# Patient Record
Sex: FEMALE | Race: WHITE
[De-identification: names, ages, dates, MRNs, and addresses within clinical notes are randomized per-mention and may not be internally consistent; named-entity substitution may affect disease eponyms.]

---

## 2017-05-02 ENCOUNTER — HOSPITAL ENCOUNTER (INPATIENT)
Dept: HOSPITAL 10 - E/R | Age: 67
LOS: 7 days | Discharge: HOME | DRG: 389 | End: 2017-05-09
Payer: COMMERCIAL

## 2017-05-02 VITALS
HEIGHT: 62 IN | WEIGHT: 132.28 LBS | HEIGHT: 62 IN | BODY MASS INDEX: 24.34 KG/M2 | WEIGHT: 132.28 LBS | BODY MASS INDEX: 24.34 KG/M2

## 2017-05-02 DIAGNOSIS — M81.0: ICD-10-CM

## 2017-05-02 DIAGNOSIS — K46.9: ICD-10-CM

## 2017-05-02 DIAGNOSIS — R55: ICD-10-CM

## 2017-05-02 DIAGNOSIS — N32.89: ICD-10-CM

## 2017-05-02 DIAGNOSIS — M48.56XA: ICD-10-CM

## 2017-05-02 DIAGNOSIS — Z90.710: ICD-10-CM

## 2017-05-02 DIAGNOSIS — Z85.41: ICD-10-CM

## 2017-05-02 DIAGNOSIS — E87.1: ICD-10-CM

## 2017-05-02 DIAGNOSIS — Z92.3: ICD-10-CM

## 2017-05-02 DIAGNOSIS — D64.9: ICD-10-CM

## 2017-05-02 DIAGNOSIS — I70.0: ICD-10-CM

## 2017-05-02 DIAGNOSIS — M47.9: ICD-10-CM

## 2017-05-02 DIAGNOSIS — I70.209: ICD-10-CM

## 2017-05-02 DIAGNOSIS — K56.5: Primary | ICD-10-CM

## 2017-05-02 DIAGNOSIS — I70.90: ICD-10-CM
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
## 2017-05-02 DIAGNOSIS — S32.501A: ICD-10-CM

## 2017-05-02 LAB
ADD SCAN DIFF: NO
ADD UMIC: YES
ALBUMIN SERPL-MCNC: 4.2 G/DL (ref 3.3–4.9)
ALBUMIN/GLOB SERPL: 1.23 {RATIO}
ALP SERPL-CCNC: 56 IU/L (ref 42–121)
ALT SERPL-CCNC: 24 IU/L (ref 13–69)
AMYLASE SERPL-CCNC: 87 U/L (ref 11–123)
ANION GAP SERPL CALC-SCNC: 10 MMOL/L (ref 8–16)
APTT BLD: 22.9 SEC (ref 25–35)
AST SERPL-CCNC: 23 IU/L (ref 15–46)
BASOPHILS # BLD AUTO: 0 10^3/UL (ref 0–0.1)
BASOPHILS NFR BLD: 0.2 % (ref 0–2)
BILIRUB DIRECT SERPL-MCNC: 0 MG/DL (ref 0–0.2)
BILIRUB SERPL-MCNC: 0.7 MG/DL (ref 0.2–1.3)
BUN SERPL-MCNC: 15 MG/DL (ref 7–20)
CALCIUM SERPL-MCNC: 9 MG/DL (ref 8.4–10.2)
CHLORIDE SERPL-SCNC: 100 MMOL/L (ref 97–110)
CO2 SERPL-SCNC: 26 MMOL/L (ref 21–31)
COLOR UR: (no result)
CREAT SERPL-MCNC: 0.55 MG/DL (ref 0.44–1)
EOSINOPHIL # BLD: 0 10^3/UL (ref 0–0.5)
EOSINOPHIL NFR BLD: 0.2 % (ref 0–7)
ERYTHROCYTE [DISTWIDTH] IN BLOOD BY AUTOMATED COUNT: 13.7 % (ref 11.5–14.5)
GLOBULIN SER-MCNC: 3.4 G/DL (ref 1.3–3.2)
GLUCOSE SERPL-MCNC: 114 MG/DL (ref 70–220)
GLUCOSE UR STRIP-MCNC: NEGATIVE %
HCT VFR BLD CALC: 34 % (ref 37–47)
HGB BLD-MCNC: 11.7 G/DL (ref 12–16)
INR PPP: 0.9
KETONES UR STRIP.AUTO-MCNC: (no result) MG/DL
LYMPHOCYTES # BLD AUTO: 0.7 10^3/UL (ref 0.8–2.9)
LYMPHOCYTES NFR BLD AUTO: 11.3 % (ref 15–51)
MCH RBC QN AUTO: 32 PG (ref 29–33)
MCHC RBC AUTO-ENTMCNC: 34.4 G/DL (ref 32–37)
MCV RBC AUTO: 92.9 FL (ref 82–101)
MONOCYTES # BLD: 0.3 10^3/UL (ref 0.3–0.9)
MONOCYTES NFR BLD: 5 % (ref 0–11)
NEUTROPHILS # BLD: 5.2 10^3/UL (ref 1.6–7.5)
NEUTROPHILS NFR BLD AUTO: 82.8 % (ref 39–77)
NITRITE UR QL STRIP.AUTO: NEGATIVE
NRBC # BLD MANUAL: 0 10^3/UL (ref 0–0)
NRBC BLD QL: 0 /100WBC (ref 0–0)
PLATELET # BLD: 229 10^3/UL (ref 140–415)
PMV BLD AUTO: 9.4 FL (ref 7.4–10.4)
POTASSIUM SERPL-SCNC: 3.7 MMOL/L (ref 3.5–5.1)
PROT SERPL-MCNC: 7.6 G/DL (ref 6.1–8.1)
PROTHROMBIN TIME: 12.1 SEC (ref 12.2–14.2)
PT RATIO: 0.9
RBC # BLD AUTO: 3.66 10^6/UL (ref 4.2–5.4)
RBC # UR AUTO: (no result) /UL
RBC #/AREA URNS HPF: (no result) /HPF
SODIUM SERPL-SCNC: 132 MMOL/L (ref 135–144)
SQUAMOUS #/AREA URNS HPF: (no result) /[HPF]
TROPONIN I SERPL-MCNC: < 0.012 NG/ML (ref 0–0.12)
URINE BILIRUBIN (DIP): NEGATIVE
URINE TOTAL PROTEIN (DIP): NEGATIVE
UROBILINOGEN UR STRIP-ACNC: (no result) (ref 0.1–1)
WBC # BLD AUTO: 6.2 10^3/UL (ref 4.8–10.8)
WBC # UR STRIP: NEGATIVE /UL

## 2017-05-02 PROCEDURE — 81003 URINALYSIS AUTO W/O SCOPE: CPT

## 2017-05-02 PROCEDURE — 84443 ASSAY THYROID STIM HORMONE: CPT

## 2017-05-02 PROCEDURE — 93880 EXTRACRANIAL BILAT STUDY: CPT

## 2017-05-02 PROCEDURE — 87086 URINE CULTURE/COLONY COUNT: CPT

## 2017-05-02 PROCEDURE — 82150 ASSAY OF AMYLASE: CPT

## 2017-05-02 PROCEDURE — 84484 ASSAY OF TROPONIN QUANT: CPT

## 2017-05-02 PROCEDURE — 82306 VITAMIN D 25 HYDROXY: CPT

## 2017-05-02 PROCEDURE — 74177 CT ABD & PELVIS W/CONTRAST: CPT

## 2017-05-02 PROCEDURE — 96376 TX/PRO/DX INJ SAME DRUG ADON: CPT

## 2017-05-02 PROCEDURE — 70450 CT HEAD/BRAIN W/O DYE: CPT

## 2017-05-02 PROCEDURE — 87040 BLOOD CULTURE FOR BACTERIA: CPT

## 2017-05-02 PROCEDURE — 83735 ASSAY OF MAGNESIUM: CPT

## 2017-05-02 PROCEDURE — 74250 X-RAY XM SM INT 1CNTRST STD: CPT

## 2017-05-02 PROCEDURE — 80048 BASIC METABOLIC PNL TOTAL CA: CPT

## 2017-05-02 PROCEDURE — 96361 HYDRATE IV INFUSION ADD-ON: CPT

## 2017-05-02 PROCEDURE — 74176 CT ABD & PELVIS W/O CONTRAST: CPT

## 2017-05-02 PROCEDURE — 85025 COMPLETE CBC W/AUTO DIFF WBC: CPT

## 2017-05-02 PROCEDURE — 93306 TTE W/DOPPLER COMPLETE: CPT

## 2017-05-02 PROCEDURE — 84100 ASSAY OF PHOSPHORUS: CPT

## 2017-05-02 PROCEDURE — 80053 COMPREHEN METABOLIC PANEL: CPT

## 2017-05-02 PROCEDURE — 96374 THER/PROPH/DIAG INJ IV PUSH: CPT

## 2017-05-02 PROCEDURE — 72158 MRI LUMBAR SPINE W/O & W/DYE: CPT

## 2017-05-02 PROCEDURE — 93005 ELECTROCARDIOGRAM TRACING: CPT

## 2017-05-02 PROCEDURE — 83690 ASSAY OF LIPASE: CPT

## 2017-05-02 PROCEDURE — 85730 THROMBOPLASTIN TIME PARTIAL: CPT

## 2017-05-02 PROCEDURE — 81001 URINALYSIS AUTO W/SCOPE: CPT

## 2017-05-02 PROCEDURE — 96375 TX/PRO/DX INJ NEW DRUG ADDON: CPT

## 2017-05-02 PROCEDURE — C9113 INJ PANTOPRAZOLE SODIUM, VIA: HCPCS

## 2017-05-02 PROCEDURE — 74000: CPT

## 2017-05-02 PROCEDURE — 85610 PROTHROMBIN TIME: CPT

## 2017-05-02 NOTE — ERA
ER Documentation


Chief Complaint


Date/Time


DATE: 5/2/17 


TIME: 18:14


Chief Complaint


Pt with AP, vomiting X 2 days and GLF this morning.





HPI


This is a very pleasant 66-year-old female presents the emergency department 

after she had a brief transient loss of consciousness at roughly 10 AM, 2 hours 

before she arrived to the hospital.  The patient indicates that just prior to 

the brief transient loss of consciousness the patient had been feeling 

lightheaded and dizzy.  She states she attempted to grab onto a piece of 

furniture when she had syncopized and hit the back of her head onto a tile 

surface.  She is complaining of a mild headache which she states is bandlike.  

The patient also indicates that yesterday evening at 10 PM she had developed a 

severe lower abdominal pain.  She indicates that the pain has been intermittent 

at 5 AM this morning, 5 hours before the syncope, the patient had a large bowel 

movement and felt severe nausea.  She induced an episode of nonbloody 

nonbilious emesis.  She denies any hemoptysis hematemesis or melanotic stools.  

She does indicate that 13 years ago she had a total abdominal hysterectomy due 

to ovarian carcinoma.  She has had previous small bowel obstructions in the 

past which she indicates feels similar to the abdominal pain she has been 

experiencing since 10 PM last night.  The patient denies any chest pain or 

pressure that radiates to the neck arm back or jaw.  She stated she had no 

headache prior to the syncope episode.  She also indicates she has not 

experienced any neck pain or numbness or tingling of her upper or lower 

extremities.  She has no shortness of breath at rest or exertion.  She denies 

any recent travel or prolonged immobilization.  She is no chest pain or 

pressure that radiates to the neck arm back or jaw





ROS


All systems reviewed and are negative except as per history of present illness.





Medications


Home Meds


Active Scripts


Docusate Sodium* (Colace*) 100 Mg Capsule, 100 MG PO BID for 30 Days, CAP


   Prov:LITA TORRES M.         12/16/16


Alendronate Sodium* (Fosamax*) 70 Mg Tablet, 70 MG PO Th@0730 for 30 Days, TAB


   Prov:LITA TORRES M.         12/16/16


Discontinued Scripts


Polyethylene Glycol* (Miralax*) 17 Gm Powd.pack, 17 GM PO DAILY for 30 Days, 1 

Refill


   Prov:LITA TORRES         12/16/16





Allergies


Allergies:  


Coded Allergies:  


     No Known Allergies (Verified  Allergy, Mild, 5/2/17)





PMhx/Soc


History of Surgery:  Yes (s/puterin resection/chemo,hysterectomy, appendectomy, 

multiple sx)


Anesthesia Reaction:  No


Hx Neurological Disorder:  No


Hx Respiratory Disorders:  No


Hx Cardiac Disorders:  No


Hx Psychiatric Problems:  No


Hx Miscellaneous Medical Probl:  Yes (cervical ca, uterin ca)


Hx Alcohol Use:  No


Hx Substance Use:  No


Hx Tobacco Use:  No


Smoking Status:  Never smoker





Physical Exam


Vitals





Vital Signs








  Date Time  Temp Pulse Resp B/P Pulse Ox O2 Delivery O2 Flow Rate FiO2


 


5/2/17 19:00 98.6 68 16 130/86 99 Room Air  


 


5/2/17 13:11 99.4 89 18 111/62 96   








Physical Exam


Constitutional:Well-developed. Well-nourished.


HEENT:Normocephalic. Atraumatic.Pupils were equal round reactive to light. 

Moist mucous membranes.No tonsillar exudates.  No nasal septal hematoma.  No 

hemotympanum.  Funduscopy exam shows sharp optic disc bilaterally


Neck: No nuchal rigidity. No lymphadenopathy. No posterior cervical spine 

tenderness or step-offs.


Respiratory: Not using accessory muscles of respiration.Lungs were clear to 

auscultation bilaterally. No rhonchi. No rales. No wheezing. 


Cardiovascular: Regular rate regular rhythm.No murmurs. No rubs were 

appreciated.S1, S2 normal. Distal pulses are palpable 2+ bilaterally.


GI: Abdomen was soft.  Mild tenderness in the left and the right lower quadrant 

with hyperactive bowel sounds. Non Distended. No pulsatile abdominal masses or 

bruits. No rebound. No guarding. 


Muscle skeletal: Full range of motion of both the upper and lower extremities 

bilaterally.Normal muscle tone.No assymetrical calf tenderness or swelling. 


Skin: No petechia, no purpura. No lesions on the palms or the soles of the 

feet. No maculopapular rash.


NEURO: Patient was alert, awake, orientated x3.No facial droop. Gait observed 

and normal with no ataxia.Speech had regular rate and rhythm. No focal 

neurological deficits.


Result Diagram:  


5/2/17 1620                                                                    

            5/2/17 1620





Results 24 hrs





 Laboratory Tests








Test


  5/2/17


16:20 5/2/17


16:38


 


White Blood Count 6.210^3/ul  


 


Red Blood Count 3.6610^6/ul  


 


Hemoglobin 11.7g/dl  


 


Hematocrit 34.0%  


 


Mean Corpuscular Volume 92.9fl  


 


Mean Corpuscular Hemoglobin 32.0pg  


 


Mean Corpuscular Hemoglobin


Concent 34.4g/dl 


  


 


 


Red Cell Distribution Width 13.7%  


 


Platelet Count 15453^3/UL  


 


Mean Platelet Volume 9.4fl  


 


Neutrophils % 82.8%  


 


Lymphocytes % 11.3%  


 


Monocytes % 5.0%  


 


Eosinophils % 0.2%  


 


Basophils % 0.2%  


 


Nucleated Red Blood Cells % 0.0/100WBC  


 


Neutrophils # 5.210^3/ul  


 


Lymphocytes # 0.710^3/ul  


 


Monocytes # 0.310^3/ul  


 


Eosinophils # 0.010^3/ul  


 


Basophils # 0.010^3/ul  


 


Nucleated Red Blood Cells # 0.010^3/ul  


 


Prothrombin Time 12.1Sec  


 


Prothrombin Time Ratio 0.9  


 


INR International Normalized


Ratio 0.90 


  


 


 


Activated Partial


Thromboplast Time 22.9Sec 


  


 


 


Sodium Level 132mmol/L  


 


Potassium Level 3.7mmol/L  


 


Chloride Level 100mmol/L  


 


Carbon Dioxide Level 26mmol/L  


 


Anion Gap 10  


 


Blood Urea Nitrogen 15mg/dl  


 


Creatinine 0.55mg/dl  


 


Glucose Level 114mg/dl  


 


Calcium Level 9.0mg/dl  


 


Total Bilirubin 0.7mg/dl  


 


Direct Bilirubin 0.00mg/dl  


 


Indirect Bilirubin 0.7mg/dl  


 


Aspartate Amino Transf


(AST/SGOT) 23IU/L 


  


 


 


Alanine Aminotransferase


(ALT/SGPT) 24IU/L 


  


 


 


Alkaline Phosphatase 56IU/L  


 


Troponin I < 0.012ng/ml  


 


Total Protein 7.6g/dl  


 


Albumin 4.2g/dl  


 


Globulin 3.40g/dl  


 


Albumin/Globulin Ratio 1.23  


 


Amylase Level 87U/L  


 


Lipase 60U/L  


 


Urine Color  LT. YELLOW 


 


Urine Clarity  CLEAR 


 


Urine pH  7.0 


 


Urine Specific Gravity  1.010 


 


Urine Ketones  TRACE 


 


Urine Nitrite  NEGATIVE 


 


Urine Bilirubin  NEGATIVE 


 


Urine Urobilinogen  0.2  E.U./dL 


 


Urine Leukocyte Esterase  NEGATIVE 


 


Urine Microscopic RBC  0-2/HPF 


 


Urine Microscopic WBC  0-2/HPF 


 


Urine Squamous Epithelial


Cells 


  FEW 


 


 


Urine Hemoglobin  TRACE 


 


Urine Glucose  NEGATIVE% 


 


Urine Total Protein  NEGATIVE 








 Current Medications








 Medications


  (Trade)  Dose


 Ordered  Sig/Stephanie


 Route


 PRN Reason  Start Time


 Stop Time Status Last Admin


Dose Admin


 


 Sodium Chloride


  (NS)  1,000 ml @ 


 1,000 mls/hr  Q1H STAT


 IV


   5/2/17 16:10


 5/2/17 17:09 DC 5/2/17 16:57


 


 


 Morphine Sulfate


  (morphine)  4 mg  ONCE  STAT


 IV


   5/2/17 16:10


 5/2/17 16:13 DC 5/2/17 16:59


 


 


 Ondansetron HCl


  (Zofran Inj)  4 mg  ONCE  STAT


 IV


   5/2/17 16:10


 5/2/17 16:13 DC 5/2/17 16:57


 


 


 IV Flush 10 ml  10 ml  STK-MED ONCE


 .ROUTE


   5/2/17 17:29


 5/2/17 17:30 DC  


 


 


 Sodium Chloride


  (NS)  100 ml @ ud  STK-MED ONCE


 .ROUTE


   5/2/17 17:29


 5/2/17 17:30 DC  


 


 


 Iohexol


  (Omnipaque 300mg/


 ml)  150 ml  STK-MED ONCE


 .ROUTE


   5/2/17 17:29


 5/2/17 17:30 DC  


 


 


 Barium Sulfate


  (Readi-Cat 2 (


 [employer] ))  450 ml  STK-MED ONCE


 PO


   5/2/17 17:39


 5/2/17 17:40 DC 5/2/17 18:26


 


 


 Barium Sulfate


  (Readi-Cat 2 (


 [employer] ))  450 ml  STK-MED ONCE


 PO


   5/2/17 17:39


 5/2/17 17:40 DC 5/2/17 18:30


 


 


 Ondansetron HCl


  (Zofran Inj)  4 mg  ONCE  STAT


 IV


   5/2/17 21:18


 5/2/17 21:19 DC 5/2/17 21:22


 


 


 Morphine Sulfate


  (morphine)  4 mg  ONCE  ONCE


 IV


   5/2/17 21:39


 5/2/17 21:40 DC 5/2/17 21:45


 


 


 Ondansetron HCl


  (Zofran Inj)  4 mg  ER BRIDGE PRN


 IV


 NAUSEA AND/OR VOMITING  5/2/17 22:00


 5/3/17 21:59   


 











Procedures/MDM


The patient presented to the emergency department with a transient loss of 

consciousness with loss of postural tone, suggestive of a syncope episode. The 

differential diagnosis of syncope is vast but my workup considered common 

benign disorders to life-threatening processes. Therefore my differential 

diagnosis included but was not limited to reflex-mediated syncope such as 

vasovagal or carotid sinus syncope from coughing, sneezing, micturition, or GI 

stimulation (eg, defecation). Other etiologies in my workup included 

orthostatic hypotension which could cause syncope from an abrupt drop in venous 

return to heart from volume depletion. An EKG and cardiac enzymes were obtained 

to rule out cardiac arrhythmias or ischemia. Cardiopulmonary disease such as 

valvular disease, hypertrophic cardiomyopathy, pericardial tamponade, or 

pulmonary embolism were considered as a  factor causing the patients syncope 

episode. The patient had no difference in blood pressure in both arms that 

could suggest aortic dissection or subclavian steal syndrome.  Rectal exam was 

negative for fecal occult blood that could suggest GI bleeding.  Ancillary 

laboratory work was obtained to evaluate for metabolic or electrolyte 

abnormalities.  The patient had no witnessed brief tonic movements that could 

suggest postictal confusion. 





The patient was placed on a cardiac monitor, continuous pulse oximetry and IV 

access established by nursing staff.  The patient received a liter bolus of 0.9 

normal saline and intravenous morphine and Zofran for analgesic control.





12 Lead EKG tracing ordered and reviewed by myself showed: 


Normal sinus rhythm of 75 bpm and no arrhythmia.


NM interval normal.


QRS duration normal.


No ST segment elevation


No ST segment depression. No changes consistent with acute ischemia. 





I obtained a CT scan of the patient's head which showed no acute intracerebral 

hemorrhage mass-effect or midline shift.  I did indicate to the patient the 

syncope could have been a result of a vasovagal episode given that she has been 

experience in pain and had an episode of emesis as well as a large bowel 

movement.  However I did explain to the patient I felt she required further 

evaluation and therefore will be admitted in serious condition to the telemetry 

service.





This patient also complained of abdominal pain. My differential diagnosis 

included but was not limited to abdominal aortic aneurysm, appendicitis, 

pancreatitis, perforated peptic ulcer, perforated viscus, Boerhaave's syndrome 

or visceral pain such as diverticulitis, DKA, esophagitis, hepatitis or bowel 

obstruction.  CT scan of the abdomen or and reviewed by myself as well as the 

radiologist indicated that the patient did have a small bowel obstruction.  At 

this time the patient had an NG tube placed to low continuous suctioning.  I 

spoke with the surgeon Dr. Weeks who kindly requested a small bowel follow-

through that was ordered by myself.  The patient was made n.p.o.  She will be 

admitted to the telemetry service in serious condition with an anticipated stay 

of greater than 2 midnights to the hospitalist Dr. Gray





Critical Care:


Time: 40 minutes


Treatments/Evaluations: Close monitoring and treatment of unstable vital signs, 

cardiorespiratory, and neurologic status, while maintaining tight balance of 

fluid, respiratory, and cardiac interventions.  Time does not include 

performing any of the above billable procedures.





Departure


Diagnosis:  


 Primary Impression:  


 Syncope


 Qualified Code:  R55 - Syncope, unspecified syncope type


 Additional Impression:  


 SBO (small bowel obstruction)


Condition:  Serious











SABINO SALDIVAR May 2, 2017 18:19

## 2017-05-02 NOTE — RADRPT
PROCEDURE:   CT Abdomen and Pelvis without contrast. 

 

CLINICAL INDICATION:    Severe abdominal pain with history of small bowel obstruction. 

 

TECHNIQUE:   A CT scan of the abdomen and pelvis was performed without intravenous contrast. Oral co
ntrast was administered prior to the examination. Coronal and sagittal reformatted images were gener
ated. Images were reviewed on a high-resolution PACS workstation. CTDIvol: 7.74 mGy. DLP: 441.78 mGy
-cm.  

 

One or more of the following dose reduction techniques were used:  

- Automated exposure control.

- Adjustment of the mA and/or kV according to patient size. 

- Use of iterative reconstruction technique.

 

COMPARISON:   12/08/2016 

 

FINDINGS:

 

There is mild to moderate atelectasis in both lower lungs.

 

Evaluation of the abdominal and pelvic viscera is limited by the lack of intravenous contrast.

 

The liver is unremarkable. The gallbladder is normal in appearance. The common bile duct is not dila
bianca. The spleen is not enlarged. No pancreatic lesion is identified and there is no pancreatic ducta
l dilatation. The adrenal glands are unremarkable. 

 

The kidneys are normal in size. There is no perinephric fat stranding. No hydronephrosis is seen. No
 urinary stone is identified. 

 

There are multiple dilated small bowel loops in the central and left abdomen.  The small bowel is co
llapsed.  Precise transition point is not definitively identified, however this is consistent with a
 small bowel obstruction.   There is no bowel wall thickening.There is minimal sigmoid colon diverti
culosis.  The appendix is not identified. 

 

The urinary bladder is distended with fluid. The patient is status post hysterectomy.  No adnexal ma
ss is seen.

 

No lymphadenopathy is identified. There is no ascites. No pneumoperitoneum is seen. There are mild a
rterial calcifications. Small fat containing midline pelvic wall hernias are noted.

 

No suspicious osseous lesion is idenitified. 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  A mode
rate chronic L5 compression fracture is also noted.

 

 

IMPRESSION:

 

1.  Small bowel obstruction.  The precise transition point is not definitively identified.  This cou
ld be further evaluated with a small bowel follow-through examination if clinically warranted.

2.  Distended urinary bladder, nonspecific.  Query urinary retention.

3.  Status post hysterectomy.

4.  Mild atherosclerotic arterial calcifications.  

5.  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

6.  Small fat containing midline pelvic wall hernias.

 

  

 

 

RPTAT: HTAR

_____________________________________________ 

.Ashok Baker MD, MD           Date    Time 

Electronically viewed and signed by .Ashok Baker MD, MD on 05/02/2017 20:06 

 

D:  05/02/2017 20:06  T:  05/02/2017 20:06

.R/

## 2017-05-02 NOTE — RADRPT
PROCEDURE:  Noncontrast CT Head. 

 

CLINICAL INDICATION: Syncope. 

 

TECHNIQUE: Noncontrast CT of the head was obtained. The administered radiation dose was CTDI vol =  
45 mGy, DLP = 720.23 mGy-cm. One or more of the following dose reduction techniques were used: Autom
ated exposure control, Adjustment of the mA and/or kV according to patient size, or Use of iterative
 reconstruction technique.

COMPARISON: Noncontrast CT of the head from December 16, 2009.

 

FINDINGS:

 

The ventricles and sulci are within normal limits. 

There are mild  vascular calcifications within the intracranial carotid arteries.

There is no loss of gray-white differentiation to suggest acute territorial infarction. 

There is no acute intracranial hemorrhage or extra-axial fluid collection. 

There is no mass effect. 

No midline shift is identified.

 

The orbits are within normal limits. 

There is minimal right sphenoid sinus mucosal thickening.  

 

No destructive osseous lesion is identified.

 

IMPRESSION:

 

No significant change.

 

No acute intracranial hemorrhage or extra-axial fluid collection.

 

Further findings as detailed above.

 

RPTAT: PP

_____________________________________________ 

.Isaiah Raza MD, MD           Date    Time 

Electronically viewed and signed by .Isaiah Raza MD, MD on 05/02/2017 19:18 

 

D:  05/02/2017 19:18  T:  05/02/2017 19:18

.F/

## 2017-05-03 VITALS — SYSTOLIC BLOOD PRESSURE: 119 MMHG | RESPIRATION RATE: 22 BRPM | HEART RATE: 86 BPM | DIASTOLIC BLOOD PRESSURE: 85 MMHG

## 2017-05-03 VITALS — RESPIRATION RATE: 19 BRPM | SYSTOLIC BLOOD PRESSURE: 116 MMHG | DIASTOLIC BLOOD PRESSURE: 64 MMHG

## 2017-05-03 VITALS — SYSTOLIC BLOOD PRESSURE: 112 MMHG | HEART RATE: 84 BPM | RESPIRATION RATE: 18 BRPM | DIASTOLIC BLOOD PRESSURE: 59 MMHG

## 2017-05-03 VITALS — HEART RATE: 82 BPM

## 2017-05-03 VITALS — RESPIRATION RATE: 19 BRPM | DIASTOLIC BLOOD PRESSURE: 59 MMHG | SYSTOLIC BLOOD PRESSURE: 105 MMHG

## 2017-05-03 VITALS — HEART RATE: 79 BPM

## 2017-05-03 VITALS — SYSTOLIC BLOOD PRESSURE: 101 MMHG | RESPIRATION RATE: 19 BRPM | DIASTOLIC BLOOD PRESSURE: 55 MMHG | HEART RATE: 76 BPM

## 2017-05-03 VITALS — HEART RATE: 73 BPM

## 2017-05-03 VITALS — HEART RATE: 90 BPM

## 2017-05-03 VITALS — TEMPERATURE: 98.2 F

## 2017-05-03 RX ADMIN — FOLIC ACID SCH MLS/HR: 5 INJECTION, SOLUTION INTRAMUSCULAR; INTRAVENOUS; SUBCUTANEOUS at 22:28

## 2017-05-03 RX ADMIN — FOLIC ACID SCH MLS/HR: 5 INJECTION, SOLUTION INTRAMUSCULAR; INTRAVENOUS; SUBCUTANEOUS at 01:00

## 2017-05-03 RX ADMIN — FOLIC ACID SCH MLS/HR: 5 INJECTION, SOLUTION INTRAMUSCULAR; INTRAVENOUS; SUBCUTANEOUS at 11:00

## 2017-05-03 RX ADMIN — FOLIC ACID SCH MLS/HR: 5 INJECTION, SOLUTION INTRAMUSCULAR; INTRAVENOUS; SUBCUTANEOUS at 11:41

## 2017-05-03 RX ADMIN — MORPHINE SULFATE PRN MG: 2 INJECTION, SOLUTION INTRAMUSCULAR; INTRAVENOUS at 11:55

## 2017-05-03 RX ADMIN — MORPHINE SULFATE PRN MG: 2 INJECTION, SOLUTION INTRAMUSCULAR; INTRAVENOUS at 18:33

## 2017-05-03 RX ADMIN — FOLIC ACID SCH MLS/HR: 5 INJECTION, SOLUTION INTRAMUSCULAR; INTRAVENOUS; SUBCUTANEOUS at 01:29

## 2017-05-03 RX ADMIN — FOLIC ACID SCH MLS/HR: 5 INJECTION, SOLUTION INTRAMUSCULAR; INTRAVENOUS; SUBCUTANEOUS at 22:31

## 2017-05-03 RX ADMIN — PANTOPRAZOLE SODIUM SCH MG: 40 INJECTION, POWDER, FOR SOLUTION INTRAVENOUS at 05:12

## 2017-05-03 NOTE — CONS
DATE OF ADMISSION: 05/02/2017

DATE OF CONSULTATION:  05/03/2017

 

 

 

SURGICAL CONSULTATION

 

REASON FOR CONSULTATION:  Small-bowel obstruction.

 

HISTORY OF PRESENT ILLNESS:  The patient is a 66-year-old female, who presented to the emergency raheem
m because of the loss of consciousness and a fall.  Also noted that the patient complained of abdomi
nal pain with nausea and vomiting, although she had a bowel movement earlier in the day.  Abdomen an
d pelvis CT was performed and was compatible with a small-bowel obstruction.  This morning's x-ray s
howed all the contrast to be in the colon with some residual small bowel dilatation.  The patient fe
els markedly symptomatically improved and her NG output has been minimal.  She has since passed gas 
and is now feeling thirsty.  I have been asked to see the patient in regards to small-bowel obstruct
ion.  Of note, is the fact that the patient is 13 years status post TAHBSO for ovarian carcinoma.  S
he has had several hospitalizations in the past for bowel obstruction, which all resolved with nonop
erative management.  She states that her symptoms last night were similar to those recent episodes.

 

REVIEW OF SYSTEMS:  HEAD, EARS, EYES, NOSE, THROAT:  Unremarkable except for dizziness, as noted abo
ve.  PULMONARY:  No history of pneumonia or shortness of breath.

CARDIAC:  No history of chest pain, MI, or arrhythmia.  ABDOMEN:  As in the HPI.  EXTREMITIES:  Unre
markable.

 

OUTPATIENT MEDICATIONS:  Include Colace, Fosamax, and MiraLAX.

 

ALLERGIES:  NONE.

 

PHYSICAL EXAMINATION:

GENERAL:  The patient is an alert, oriented 66-year-old female, who is in no acute distress.  She st
ates that she feels markedly improved.  There is a nasogastric tube in place.

LUNGS:  Clear.

HEART:  Regular rhythm.

ABDOMEN:  Entirely soft and nontender without masses.  There is a well-healed lower vertical midline
 scar.

EXTREMITIES:  Unremarkable.

 

LABORATORY DATA:  The patient's hematocrit is 34 with a white count of 6200 without left shift.

 

IMPRESSION:  Partial small-bowel obstruction appears to be resolving clinically.

 

PLAN:  We will discontinue the NG tube and start clear liquids.  A small bowel follow through has be
en ordered for the morning.  Further recommendations will be based on the patient's further workup a
nd clinical course.

 

 

Dictated By: MELVI XIONG/ELOY

DD:    05/03/2017 19:06:28

DT:    05/03/2017 21:21:54

Conf#: [id number]

DID#:  [id number]

## 2017-05-03 NOTE — RADRPT
PROCEDURE:   XR Abdomen. 

 

CLINICAL INDICATION:   Abdomen pain.  Small bowel obstruction. 

 

TECHNIQUE:   AP supine abdomen x-ray. 

 

COMPARISON:   CT scan of the abdomen and pelvis dated 05/02/2017. 

 

FINDINGS:

A nasogastric tube is present with the tip in the stomach.  Contrast which was administered for the 
CT scan is now all in the colon or eliminated from the gastrointestinal tract.  There are dilated lo
ops of small bowel in the left side of the abdomen measuring up to 4.2 cm, similar to the prior stud
y.  Surgical clips are present in the mid abdomen and pelvis. 

The osseus structures are unremarkable.   

 

IMPRESSION:

1.  Partial small bowel obstruction with all of the gastrointestinal contrast seen on the prior CT s
can L in the colon or eliminated from the gastrointestinal tract.

2.  Nasogastric tube tip in the stomach.  

 

RPTAT: QQ

_____________________________________________ 

.Forest Encinas MD, MD           Date    Time 

Electronically viewed and signed by .Forest Encinas MD, MD on 05/03/2017 13:00 

 

D:  05/03/2017 13:00  T:  05/03/2017 13:00

.R/

## 2017-05-03 NOTE — PN
Date/Time of Note


Date/Time of Note


DATE: 5/3/17 


TIME: 13:14





Assessment/Plan


VTE Prophylaxis


VTE Prophylaxis Intervention:  LMWH





Lines/Catheters


IV Catheter Type (from [facility]):  Saline Lock


Urinary Cath still in place:  No





Assessment/Plan


Chief Complaint/Hosp Course


Assessment





1.  Small bowel obstruction in a patient with a history of ovarian cancer 

status post total abdominal hysterectomy in the past.  Patient states she has a 

history of recurrent small bowel obstruction.


2.  Syncopal episode possibly related to bowel obstruction


3.  History of osteoporosis





Plan





1.  Echocardiogram and carotid Dopplers


2.  Pending general surgery evaluation with Dr. Weeks


3.  Continue nasogastric tube to suction IV fluids and monitor labs 





Disposition





Transfer to [facility]


Problems:  





Subjective


24 Hr Interval Summary


Free Text/Dictation


Patient doing okay this morning has mild abdominal discomfort but no nausea 

vomiting


Nasogastric tube in place





Exam/Review of Systems


Vital Signs


Vitals





 Vital Signs








  Date Time  Temp Pulse Resp B/P Pulse Ox O2 Delivery O2 Flow Rate FiO2


 


5/3/17 12:38  79      


 


5/3/17 11:48 98.1  19 116/64 94   


 


5/3/17 09:30      Room Air  














 Intake and Output   


 


 5/2/17 5/2/17 5/3/17





 15:00 23:00 07:00


 


Intake Total  1000 ml 450 ml


 


Balance  1000 ml 450 ml











Exam


GENERAL: Well-nourished well-developed lady comfortable at rest


VITAL SIGNS:  per chart


NECK:  Supple.  No JVD or lymphadenopathy.


CARDIAC EXAM:  S1, S2. No added sounds or murmurs.


CHEST:  clear bilaterally, No added sounds, rales or wheezes


ABDOMEN:  Soft, nontender.  No guarding or rebound.


EXTREMITIES:  No cyanosis, clubbing or edema.


NEUROLOGIC:  Generalized weakness.  No focal deficits.





Results


CT abdomen


IMPRESSION:


 


1.  Small bowel obstruction.  The precise transition point is not definitively 

identified.  This could be further evaluated with a small bowel follow-through 

examination if clinically warranted.


2.  Distended urinary bladder, nonspecific.  Query urinary retention.


3.  Status post hysterectomy.


4.  Mild atherosclerotic arterial calcifications.  


5.  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 

fracture.


6.  Small fat containing midline pelvic wall hernias.


Result Diagram:  


5/2/17 1620                                                                    

            5/2/17 1620





Results 24 hrs





Laboratory Tests








Test


  5/2/17


16:20 5/2/17


16:38


 


White Blood Count 6.2  # 


 


Red Blood Count 3.66  L 


 


Hemoglobin 11.7  L 


 


Hematocrit 34.0  L 


 


Mean Corpuscular Volume 92.9   


 


Mean Corpuscular Hemoglobin 32.0   


 


Mean Corpuscular Hemoglobin


Concent 34.4  


  


 


 


Red Cell Distribution Width 13.7   


 


Platelet Count 229   


 


Mean Platelet Volume 9.4  # 


 


Neutrophils % 82.8  H 


 


Lymphocytes % 11.3  L 


 


Monocytes % 5.0   


 


Eosinophils % 0.2   


 


Basophils % 0.2   


 


Nucleated Red Blood Cells % 0.0   


 


Neutrophils # 5.2   


 


Lymphocytes # 0.7  L 


 


Monocytes # 0.3   


 


Eosinophils # 0.0   


 


Basophils # 0.0   


 


Nucleated Red Blood Cells # 0.0   


 


Prothrombin Time 12.1  L 


 


Prothrombin Time Ratio 0.9   


 


INR International Normalized


Ratio 0.90  


  


 


 


Activated Partial


Thromboplast Time 22.9  L


  


 


 


Sodium Level 132  L 


 


Potassium Level 3.7   


 


Chloride Level 100   


 


Carbon Dioxide Level 26   


 


Anion Gap 10   


 


Blood Urea Nitrogen 15   


 


Creatinine 0.55   


 


Glucose Level 114   


 


Calcium Level 9.0   


 


Total Bilirubin 0.7   


 


Direct Bilirubin 0.00   


 


Indirect Bilirubin 0.7   


 


Aspartate Amino Transf


(AST/SGOT) 23  


  


 


 


Alanine Aminotransferase


(ALT/SGPT) 24  


  


 


 


Alkaline Phosphatase 56   


 


Troponin I < 0.012   


 


Total Protein 7.6   


 


Albumin 4.2   


 


Globulin 3.40  H 


 


Albumin/Globulin Ratio 1.23   


 


Amylase Level 87   


 


Lipase 60   


 


Urine Color  LT. YELLOW  


 


Urine Clarity  CLEAR  


 


Urine pH  7.0  


 


Urine Specific Gravity  1.010  


 


Urine Ketones  TRACE  H


 


Urine Nitrite  NEGATIVE  


 


Urine Bilirubin  NEGATIVE  


 


Urine Urobilinogen  0.2  E.U./dL  


 


Urine Leukocyte Esterase  NEGATIVE  


 


Urine Microscopic RBC  0-2  


 


Urine Microscopic WBC  0-2  


 


Urine Squamous Epithelial


Cells 


  FEW  


 


 


Urine Hemoglobin  TRACE  


 


Urine Glucose  NEGATIVE  


 


Urine Total Protein  NEGATIVE  











Medications


Medications





 Current Medications


Sodium Chloride 1,000 ml @  100 mls/hr Q10H IV ;  Start 5/3/17 at 01:00


Sodium Chloride (NS) 1,000 ml @  100 mls/hr Q10H IV  Last administered on 5/3/

17at 11:41; Admin Dose 100 MLS/HR;  Start 5/3/17 at 00:51


Pantoprazole (Protonix Iv) 40 mg DAILY@06 IV  Last administered on 5/3/17at 05:

12; Admin Dose 40 MG;  Start 5/3/17 at 06:00


Ondansetron HCl (Zofran Inj) 4 mg Q4H  PRN IV NAUSEA AND/OR VOMITING;  Start 5/3

/17 at 01:00


Morphine Sulfate (morphine) 2 mg Q4H  PRN IV PAIN Last administered on 5/3/17at 

11:55; Admin Dose 2 MG;  Start 5/3/17 at 01:00











PIYUSH GUADALUPE MD, [facility] May 3, 2017 13:17

## 2017-05-03 NOTE — HP
Date/Time of Note


Date/Time of Note


DATE: 5/3/17 


TIME: 00:50





Assessment/Plan


VTE Prophylaxis


VTE Prophylaxis Intervention:  SCD's





Lines/Catheters


Central line still needed:  No


Urinary Cath still in place:  No





Assessment/Plan


Chief Complaint/Hosp Course


This is a 66-year-old female being admitted to [facility] for:


#1 small bowel obstruction: Likely secondary to adhesions from multiple 

abdominal surgeries/ radiation.  Small bowel follow-through was ordered however 

was not able to be adequately completed, will need to reorder the test tomorrow 

to assess for the transition point. NG tube in place.  N.p.o.  Normal saline at 

100 cc an hour.  Zofran 4 mg IV every 6 hours as needed nausea.  Dr. Weeks 

notified by the ED physician.


#2  Mild hyponatremia: We will continue IV fluids at this time as per #1 and 

continue to follow with BMP in the a.m..


#3 osteoporosis: Stable we will hold current home medications at this time and 

restart once SPO resolves.


#4 DVT and GI prophylaxis: SCDs, PPI


Problems:  





HPI/ROS


Admit Date/Time


Admit Date/Time


 5/1/2017





Hx of Present Illness


This is a very pleasant 66-year-old female with a history of multiple abdominal 

surgeries who presented to the emergency department  after a brief episode of 

loss of consciousness during the day.  The patient indicates that just prior to 

the brief transient loss of consciousness the patient had been feeling 

lightheaded and dizzy.  She states she attempted to grab onto a piece of 

furniture when she had syncopized and hit the back of her head onto a tile 

surface.  She is complaining of a mild headache which she states is bandlike.  

The patient also indicates that yesterday evening at 10 PM she had developed a 

severe lower abdominal pain.  She indicates that the pain has been intermittent 

at 5 AM this morning, 5 hours before the syncope, the patient had a large bowel 

movement and felt severe nausea.  She induced an episode of nonbloody 

nonbilious emesis.  She denies any hemoptysis hematemesis or melanotic stools.  

She does indicate that 13 years ago she had a total abdominal hysterectomy due 

to ovarian carcinoma.  She has had previous small bowel obstructions in the 

past which she indicates feels similar to the abdominal pain she has been 

experiencing since 10 PM last night.  Her most recent surgery was approximately 

8 years ago for a bladder issue.  The patient denies any chest pain or pressure 

that radiates to the neck arm back or jaw.  She stated she had no headache 

prior to the syncope episode.  She also indicates she has not experienced any 

neck pain or numbness or tingling of her upper or lower extremities.  She has 

no shortness of breath at rest or exertion.  She denies any recent travel or 

prolonged immobilization.  She is no chest pain or pressure that radiates to 

the neck arm back or jaw





ROS


Const: Negative for fever, chills, weight gain or weight loss, 


Eyes : No pain discharge or redness or change in visual acuity


ENT: No pain, sore throat, congestion, congestion,  dysphagia or discharge


Respiratory: No shortness of breath, cough, sputum, wheezing, or pleuritic pain


Cardiovascular: No chest pain, palpitation, PND, or edema


GI : Abdominal pain has improved from before, has some nausea


Genitourinary: No dysuria, hematuria, flank pain ,  discharge or CVA tenderness


Musculoskeletal: No joint pain, back pain, neck pain, restricted range of 

motion in neck or joints


Skin: No rash, bruising or hives 


Neuro: No headache, dizziness, syncope, seizure, focal weakness


Endocrine: No polyuria, polydipsia, temperature intolerance


Psych: No hallucination, depression, anxiety or suicidal ideation





PMH/Family/Social


Past Medical History


Osteoporosis, cervical cancer





Past Surgical History


Multiple abdominal surgeries 7, including total abdominal hysterectomy, 

appendectomy, bladder surgery, status post radiation for cervical cancer





Family History


Significant Family History:  heart disease, other (Father: Liver cirrhosis)





Social History


Denies any smoking, denies any alcohol use


Smoking Status:  Never smoker





Exam/Review of Systems


Vital Signs


Vitals





 Vital Signs








  Date Time  Temp Pulse Resp B/P Pulse Ox O2 Delivery O2 Flow Rate FiO2


 


5/3/17 00:09  62 16 129/80 99 Room Air  


 


5/2/17 19:00 98.6       











Exam


Exam


Vitals


General: The patient is well-developed, well-nourished woman in no acute 

distress.


The patient is alert oriented -3 lying comfortably in bed.


HEENT: Atraumatic, normocephalic. The pupils are equal, round and reactive. 

Extraocular motor are intact, NG tube in place


Neck: Supple with full range of motion. No rigidity or meningismus


Chest: Nontender


Lungs: Clear to auscultation bilaterally no crackles rales or wheezing


Heart: Normal S1-S2, Regular rhythm and rate. No murmur


Abdomen: Soft, hypoactive bowel sounds, tender to palpation along the left 

abdominal quadrants


Extremities: Normal to inspection, no edema no cyanosis


Neurologic: Normal mental status, speech normal, cranial nerves II through XII 

are intact, motor and sensory are intact, no focal weakness


Additional Comments


CT abdomen pelvis


IMPRESSION:


 


1.  Small bowel obstruction.  The precise transition point is not definitively 

identified.  This could be further evaluated with a small bowel follow-through 

examination if clinically warranted.


2.  Distended urinary bladder, nonspecific.  Query urinary retention.


3.  Status post hysterectomy.


4.  Mild atherosclerotic arterial calcifications.  


5.  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 

fracture.


6.  Small fat containing midline pelvic wall hernias.





Labs


Result Diagram:  


5/2/17 1620                                                                    

            5/2/17 1620














HUNG HAIDER May 3, 2017 01:06

## 2017-05-04 VITALS — HEART RATE: 72 BPM

## 2017-05-04 VITALS — SYSTOLIC BLOOD PRESSURE: 104 MMHG | RESPIRATION RATE: 17 BRPM | DIASTOLIC BLOOD PRESSURE: 54 MMHG | HEART RATE: 72 BPM

## 2017-05-04 VITALS — RESPIRATION RATE: 19 BRPM | SYSTOLIC BLOOD PRESSURE: 110 MMHG | HEART RATE: 78 BPM | DIASTOLIC BLOOD PRESSURE: 55 MMHG

## 2017-05-04 VITALS — DIASTOLIC BLOOD PRESSURE: 52 MMHG | RESPIRATION RATE: 18 BRPM | SYSTOLIC BLOOD PRESSURE: 97 MMHG

## 2017-05-04 VITALS — SYSTOLIC BLOOD PRESSURE: 95 MMHG | DIASTOLIC BLOOD PRESSURE: 53 MMHG | HEART RATE: 69 BPM

## 2017-05-04 VITALS — RESPIRATION RATE: 17 BRPM | SYSTOLIC BLOOD PRESSURE: 110 MMHG | DIASTOLIC BLOOD PRESSURE: 57 MMHG

## 2017-05-04 VITALS — DIASTOLIC BLOOD PRESSURE: 50 MMHG | SYSTOLIC BLOOD PRESSURE: 92 MMHG | RESPIRATION RATE: 18 BRPM

## 2017-05-04 VITALS — SYSTOLIC BLOOD PRESSURE: 101 MMHG | DIASTOLIC BLOOD PRESSURE: 51 MMHG | RESPIRATION RATE: 18 BRPM | HEART RATE: 71 BPM

## 2017-05-04 VITALS — HEART RATE: 82 BPM

## 2017-05-04 VITALS — HEART RATE: 79 BPM

## 2017-05-04 VITALS — HEART RATE: 81 BPM

## 2017-05-04 VITALS — HEART RATE: 77 BPM

## 2017-05-04 VITALS — HEART RATE: 75 BPM

## 2017-05-04 LAB
ADD SCAN DIFF: NO
ALBUMIN SERPL-MCNC: 3.2 G/DL (ref 3.3–4.9)
ALBUMIN/GLOB SERPL: 1.14 {RATIO}
ALP SERPL-CCNC: 39 IU/L (ref 42–121)
ALT SERPL-CCNC: 22 IU/L (ref 13–69)
ANION GAP SERPL CALC-SCNC: 13 MMOL/L (ref 8–16)
AST SERPL-CCNC: 30 IU/L (ref 15–46)
BASOPHILS # BLD AUTO: 0 10^3/UL (ref 0–0.1)
BASOPHILS NFR BLD: 0.5 % (ref 0–2)
BILIRUB DIRECT SERPL-MCNC: 0 MG/DL (ref 0–0.2)
BILIRUB SERPL-MCNC: 0.9 MG/DL (ref 0.2–1.3)
BUN SERPL-MCNC: 10 MG/DL (ref 7–20)
CALCIUM SERPL-MCNC: 7.9 MG/DL (ref 8.4–10.2)
CHLORIDE SERPL-SCNC: 107 MMOL/L (ref 97–110)
CO2 SERPL-SCNC: 21 MMOL/L (ref 21–31)
CREAT SERPL-MCNC: 0.52 MG/DL (ref 0.44–1)
EOSINOPHIL # BLD: 0 10^3/UL (ref 0–0.5)
EOSINOPHIL NFR BLD: 0.7 % (ref 0–7)
ERYTHROCYTE [DISTWIDTH] IN BLOOD BY AUTOMATED COUNT: 14.2 % (ref 11.5–14.5)
GLOBULIN SER-MCNC: 2.8 G/DL (ref 1.3–3.2)
GLUCOSE SERPL-MCNC: 86 MG/DL (ref 70–220)
HCT VFR BLD CALC: 32.6 % (ref 37–47)
HGB BLD-MCNC: 10.5 G/DL (ref 12–16)
LYMPHOCYTES # BLD AUTO: 0.9 10^3/UL (ref 0.8–2.9)
LYMPHOCYTES NFR BLD AUTO: 20.7 % (ref 15–51)
MAGNESIUM SERPL-MCNC: 2 MG/DL (ref 1.7–2.5)
MCH RBC QN AUTO: 31.5 PG (ref 29–33)
MCHC RBC AUTO-ENTMCNC: 32.2 G/DL (ref 32–37)
MCV RBC AUTO: 97.9 FL (ref 82–101)
MONOCYTES # BLD: 0.3 10^3/UL (ref 0.3–0.9)
MONOCYTES NFR BLD: 6.5 % (ref 0–11)
NEUTROPHILS # BLD: 3.1 10^3/UL (ref 1.6–7.5)
NEUTROPHILS NFR BLD AUTO: 71.1 % (ref 39–77)
NRBC # BLD MANUAL: 0 10^3/UL (ref 0–0)
NRBC BLD QL: 0 /100WBC (ref 0–0)
PHOSPHATE SERPL-MCNC: 2 MG/DL (ref 2.5–4.9)
PLATELET # BLD: 198 10^3/UL (ref 140–415)
PMV BLD AUTO: 10.2 FL (ref 7.4–10.4)
POTASSIUM SERPL-SCNC: 4.4 MMOL/L (ref 3.5–5.1)
PROT SERPL-MCNC: 6 G/DL (ref 6.1–8.1)
RBC # BLD AUTO: 3.33 10^6/UL (ref 4.2–5.4)
SODIUM SERPL-SCNC: 137 MMOL/L (ref 135–144)
WBC # BLD AUTO: 4.3 10^3/UL (ref 4.8–10.8)

## 2017-05-04 RX ADMIN — HYDROCODONE BITARTRATE AND ACETAMINOPHEN PRN TAB: 5; 325 TABLET ORAL at 21:42

## 2017-05-04 RX ADMIN — PANTOPRAZOLE SODIUM SCH MG: 40 INJECTION, POWDER, FOR SOLUTION INTRAVENOUS at 05:46

## 2017-05-04 RX ADMIN — HYDROCODONE BITARTRATE AND ACETAMINOPHEN PRN TAB: 5; 325 TABLET ORAL at 10:17

## 2017-05-04 RX ADMIN — FOLIC ACID SCH MLS/HR: 5 INJECTION, SOLUTION INTRAMUSCULAR; INTRAVENOUS; SUBCUTANEOUS at 15:49

## 2017-05-04 RX ADMIN — FOLIC ACID SCH MLS/HR: 5 INJECTION, SOLUTION INTRAMUSCULAR; INTRAVENOUS; SUBCUTANEOUS at 05:46

## 2017-05-04 RX ADMIN — ENOXAPARIN SODIUM SCH MG: 100 INJECTION SUBCUTANEOUS at 15:52

## 2017-05-04 NOTE — RADRPT
PROCEDURE:   XR Abdomen. 

 

CLINICAL INDICATION:   Abdomen pain. 

 

TECHNIQUE:   AP supine abdomen x-ray. 

 

COMPARISON:   05/03/2017. 

 

FINDINGS:

There is contrast in the colon from the prior CT scan. 

Previously noted dilated small bowel in the left side of the abdomen is markedly improved.  Multiple
 surgical clips are present in the pelvis. 

There are no abnormal calcifications overlying the urinary tracts. 

There are degenerative changes of the spine.   

 

IMPRESSION:

1.  Improved partial small bowel obstruction.  

 

RPTAT: QQ

_____________________________________________ 

.Forest Encinas MD, MD           Date    Time 

Electronically viewed and signed by .oFrest Encinas MD, MD on 05/04/2017 13:38 

 

D:  05/04/2017 13:38  T:  05/04/2017 13:38

.R/

## 2017-05-04 NOTE — RADRPT
PROCEDURE:   US Carotids. 

 

CLINICAL INDICATION:   Bruit, syncope 

 

TECHNIQUE:   Multiple sonographic of the carotid arteries were obtained utilizing gray scale imaging
.  Color and Doppler imaging was performed.  The images were reviewed on a PACS workstation. 

 

COMPARISON:   No prior studies are available for comparison. 

 

FINDINGS:

 

RIGHT:                

CCA                   72.4 cm/sec           

Prox ICA            52.2  cm/sec           

Mid ICA              44.4 cm/sec           

Dist ICA             50.1 cm/sec           

ECA                   66.5  cm/sec           

ICA/CCA            0.8                       

 

LEFT:

 

CCA                   63.1  cm/sec           

Prox ICA            64.4  cm/sec           

Mid ICA              51.6 cm/sec           

Dist ICA             56.3  cm/sec           

ECA                   59.7 cm/sec           

ICA/CCA            1.1                        

 

Antegrade flow is seen within the vertebral arteries bilaterally. No significant plaque is seen with
in the carotid system bilaterally.  However, no evidence for hemodynamically significant stenosis or
 occlusion is identified.  

 

IMPRESSION:

 

1.  No sonographic evidence for hemodynamically significant stenosis or occlusion by NASCET criteria
.

2.  Antegrade flow seen within the vertebral arteries bilaterally.

 

RPTAT: HH

_____________________________________________ 

.Martha Christian MD, MD           Date    Time 

Electronically viewed and signed by .Martha Christian MD, MD on 05/04/2017 06:00 

 

D:  05/04/2017 06:00  T:  05/04/2017 06:00

.G/

## 2017-05-04 NOTE — PN
DATE:  

 

 

SUBJECTIVE:  The patient is markedly symptomatically improved. She states that she feels much better
.  She is passing gas and had a small bowel movement.  Her abdominal examination is benign.

 

PLAN:  Awaiting SBFT today.  Further recommendations will be forthcoming, based on the patient's fur
ther workup and clinical course.

 

 

Dictated By: MELVI XIONG/ELOY

DD:    05/04/2017 07:34:40

DT:    05/04/2017 08:24:36

Conf#: [id number]

DID#:  [id number]

## 2017-05-04 NOTE — PN
Date/Time of Note


Date/Time of Note


DATE: 5/4/17 


TIME: 14:57





Assessment/Plan


VTE Prophylaxis


VTE Prophylaxis Intervention:  LMWH





Lines/Catheters


IV Catheter Type (from Nrs):  Peripheral IV


Urinary Cath still in place:  No





Assessment/Plan


Assessment/Plan


1.  Small bowel obstruction, likely resolved, advance diet


2.  Syncopal episode possibly related to bowel obstruction, awaiting for echo 

report


3.  History of osteoporosis





Subjective


24 Hr Interval Summary


Free Text/Dictation


no dizziness, mo abdominal pain, tolerates liquid





Exam/Review of Systems


Vital Signs


Vitals





 Vital Signs








  Date Time  Temp Pulse Resp B/P Pulse Ox O2 Delivery O2 Flow Rate FiO2


 


5/4/17 12:39 98.2 71 18 101/51 96 Room Air  














 Intake and Output   


 


 5/3/17 5/3/17 5/4/17





 15:00 23:00 07:00


 


Intake Total  600 ml 250 ml


 


Balance  600 ml 250 ml











Exam


Constitutional:  alert, oriented, well developed


Psych:  nl mood/affect, no complaints


Head:  atraumatic, normocephalic


Eyes:  EOMI, PERRL, nl conjunctiva, nl lids


ENMT:  nl external ears & nose, nl lips & teeth, nl nasal mucosa & septum


Neck:  non-tender, supple


Respiratory:  clear to auscultation, normal air movement, 


   No congested cough, No crackles/rales, No diminished breath sounds, No 

intercostal retraction, No labored breathing, No other, No respirations, No 

tactile fremitus, No wheezing


Cardiovascular:  nl pulses, regular rate and rhythm, 


   No S3, No S4, No bruits, No diastolic murmur, No edema, No gallop, No 

irregular rhythm, No jugular venous distention (JVD), No murmurs/extra sounds, 

No other, No rub, No systolic murmur


Gastrointestinal:  nl liver, spleen, non-tender, soft, 


   No ascites, No bowel sounds, No distended, No firm, No hepatomegaly, No mass

, No other, No rebound or guarding, No splenomegaly, No surgical scars, No 

tender


Musculoskeletal:  nl extremities to inspection


Extremities:  normal pulses, 


   No calf tenderness, No clubbing, No cyanosis, No edema, No other, No 

palpable cord, No pitting pedal edema, No tenderness


Neurological:  CNS II-XII intact, nl mental status, nl speech, nl strength


Skin:  nl turgor


Lymph:  nl lymph nodes





Results


Result Diagram:  


5/4/17 0751                                                                    

            5/4/17 0751





Results 24 hrs





Laboratory Tests








Test


  5/4/17


07:51


 


White Blood Count 4.3  #L


 


Red Blood Count 3.33  L


 


Hemoglobin 10.5  L


 


Hematocrit 32.6  L


 


Mean Corpuscular Volume 97.9  


 


Mean Corpuscular Hemoglobin 31.5  


 


Mean Corpuscular Hemoglobin


Concent 32.2  


 


 


Red Cell Distribution Width 14.2  


 


Platelet Count 198  


 


Mean Platelet Volume 10.2  


 


Neutrophils % 71.1  


 


Lymphocytes % 20.7  


 


Monocytes % 6.5  


 


Eosinophils % 0.7  


 


Basophils % 0.5  


 


Nucleated Red Blood Cells % 0.0  


 


Neutrophils # 3.1  


 


Lymphocytes # 0.9  


 


Monocytes # 0.3  


 


Eosinophils # 0.0  


 


Basophils # 0.0  


 


Nucleated Red Blood Cells # 0.0  


 


Sodium Level 137  


 


Potassium Level 4.4  


 


Chloride Level 107  


 


Carbon Dioxide Level 21  


 


Anion Gap 13  


 


Blood Urea Nitrogen 10  


 


Creatinine 0.52  


 


Glucose Level 86  


 


Calcium Level 7.9  L


 


Phosphorus Level 2.0  L


 


Magnesium Level 2.0  


 


Total Bilirubin 0.9  


 


Direct Bilirubin 0.00  


 


Indirect Bilirubin 0.9  


 


Aspartate Amino Transf


(AST/SGOT) 30  


 


 


Alanine Aminotransferase


(ALT/SGPT) 22  


 


 


Alkaline Phosphatase 39  L


 


Total Protein 6.0  #L


 


Albumin 3.2  #L


 


Globulin 2.80  


 


Albumin/Globulin Ratio 1.14  











Medications


Medications





 Current Medications


Sodium Chloride 1,000 ml @  100 mls/hr Q10H IV ;  Start 5/3/17 at 01:00


Sodium Chloride (NS) 1,000 ml @  100 mls/hr Q10H IV  Last administered on 5/4/ 17at 05:46; Admin Dose 100 MLS/HR;  Start 5/3/17 at 00:51


Pantoprazole (Protonix Iv) 40 mg DAILY@06 IV  Last administered on 5/4/17at 05:

46; Admin Dose 40 MG;  Start 5/3/17 at 06:00


Ondansetron HCl (Zofran Inj) 4 mg Q4H  PRN IV NAUSEA AND/OR VOMITING;  Start 5/3

/17 at 01:00


Morphine Sulfate (morphine) 2 mg Q4H  PRN IV PAIN Last administered on 5/3/17at 

18:33; Admin Dose 2 MG;  Start 5/3/17 at 01:00


Acetaminophen/ Hydrocodone Bitart (Norco (5/325)) 1 tab Q4H  PRN PO PAIN Last 

administered on 5/4/17at 10:17; Admin Dose 1 TAB;  Start 5/4/17 at 10:00











SOURAV FORD MD May 4, 2017 15:04

## 2017-05-04 NOTE — RADRPT
Echocardiogram Report

 

Patient Name:  AMINA RAND   Gender:       Female

MRN:           [id number]        Accession #:  [id number]

Birth Date:    1950   Study Date:   03-May-2017

[facility]   [facility]  [facility]     [id number]

 

Ref. Physician: PIYUSH GUADALUPE

Quality: Good

 

Procedures: Transthoracic echocardiogram with complete 2D, M-Mode, and 

doppler examination.

Indications: Syncope.

 

2D/M Mode                          Doppler

Measurement  Value  Normal Ranges  Measurement     Value  Normal Ranges

LVIDd 2D     3.5    3.5 - 5.6 cm   AV Peak [place]     1.9    m/sec

LVIDs 2D     1.4    2.1 - 4.1 cm   AV Peak PG      13.8   mmHg

LVPWd 2D     1.0    0.6 - 1.1 cm   LVOT Peak [place]   1.3    m/sec

IVSd 2D      1.0    0.6 - 1.1 cm   LVOT Peak PG    6.6    mmHg

AoR Diam 2D  2.8    2.0 - 3.7 cm   MV E Peak [place]   0.8    m/sec

EDV 2D       52.6   cm3            MV A Peak [place]   0.9    m/sec

ESV 2D       2.6    cm3            MV E/A          0.9

LA Dimen 2D  2.8    2.3 - 4.0 cm   MV Decel Time   151    msec

MV Decel [place]  5

MV E/A          0.9

TR Peak Igles[place]     2.1    m/sec

TR Peak PG      18.3   mmHg

RVSP            26.0   mmHg

 

Findings

Left Ventricle: Normal left ventricular systolic function.  Normal left 

ventricular cavity size.  Normal left ventricular wall thickness.  

Ejection fraction is visually estimated at 65 %.  Tissue Doppler/Mitral 

Doppler indices are consistent with impaired relaxation (Stage I 

diastolic dysfunction).

Right Ventricle: Normal right ventricular size.  Normal right 

ventricular systolic function.

Left Atrium: The left atrium is normal in size.

Right Atrium: The right atrium is normal in size.

Mitral Valve: Normal appearance and function of the mitral valve with 

trace physiologic regurgitation.

Aortic Valve: Normal appearance of the aortic valve.  No significant 

aortic stenosis or insufficiency.

Tricuspid Valve: Normal appearance and function of the tricuspid valve 

with trace physiologic regurgitation.  Estimated peak PA systolic 

pressure 26 mmHg.

Pulmonic Valve: Normal pulmonic valve appearance.

Pericardium: Normal pericardium with no significant pericardial effusion.

Aorta: Normal aortic root.

IVC: Normal size and no respiratory collapse consistent with elevated 

right atrial pressure.

 

Conclusions

1.Normal left ventricular systolic function.  Normal left ventricular 

cavity size.  Normal left ventricular wall thickness.  Ejection fraction 

is visually estimated at 65 %.  Tissue Doppler/Mitral Doppler indices 

are consistent with impaired relaxation (Stage I diastolic dysfunction).

2.Normal right ventricular size.  Normal right ventricular systolic 

function.

3.The left atrium is normal in size.

4.The right atrium is normal in size.

5.No significant valvular stenosis or regurgitation seen.

6.Normal pericardium with no significant pericardial effusion.

 

Electronically Signed By:

Richard Liu

04-May-2017 17:18:13  -0700

 

Patient Name: AMINA RAND

MRN: [id number]

Study Date: 03-May-2017

 

[id number]

## 2017-05-05 VITALS — DIASTOLIC BLOOD PRESSURE: 62 MMHG | RESPIRATION RATE: 15 BRPM | SYSTOLIC BLOOD PRESSURE: 135 MMHG

## 2017-05-05 VITALS — HEART RATE: 65 BPM

## 2017-05-05 VITALS — HEART RATE: 61 BPM

## 2017-05-05 VITALS — HEART RATE: 69 BPM

## 2017-05-05 VITALS — SYSTOLIC BLOOD PRESSURE: 117 MMHG | RESPIRATION RATE: 18 BRPM | DIASTOLIC BLOOD PRESSURE: 55 MMHG

## 2017-05-05 VITALS — RESPIRATION RATE: 17 BRPM | DIASTOLIC BLOOD PRESSURE: 59 MMHG | SYSTOLIC BLOOD PRESSURE: 106 MMHG

## 2017-05-05 VITALS — DIASTOLIC BLOOD PRESSURE: 55 MMHG | RESPIRATION RATE: 18 BRPM | SYSTOLIC BLOOD PRESSURE: 104 MMHG

## 2017-05-05 VITALS — HEART RATE: 84 BPM

## 2017-05-05 VITALS — SYSTOLIC BLOOD PRESSURE: 138 MMHG | RESPIRATION RATE: 18 BRPM | DIASTOLIC BLOOD PRESSURE: 67 MMHG

## 2017-05-05 VITALS — HEART RATE: 68 BPM

## 2017-05-05 LAB
ANION GAP SERPL CALC-SCNC: 6 MMOL/L (ref 8–16)
BUN SERPL-MCNC: 6 MG/DL (ref 7–20)
CALCIUM SERPL-MCNC: 7.9 MG/DL (ref 8.4–10.2)
CHLORIDE SERPL-SCNC: 109 MMOL/L (ref 97–110)
CO2 SERPL-SCNC: 27 MMOL/L (ref 21–31)
CREAT SERPL-MCNC: 0.57 MG/DL (ref 0.44–1)
GLUCOSE SERPL-MCNC: 96 MG/DL (ref 70–220)
MAGNESIUM SERPL-MCNC: 1.9 MG/DL (ref 1.7–2.5)
POTASSIUM SERPL-SCNC: 3.8 MMOL/L (ref 3.5–5.1)
SODIUM SERPL-SCNC: 138 MMOL/L (ref 135–144)

## 2017-05-05 RX ADMIN — FOLIC ACID SCH MLS/HR: 5 INJECTION, SOLUTION INTRAMUSCULAR; INTRAVENOUS; SUBCUTANEOUS at 22:03

## 2017-05-05 RX ADMIN — HYDROCODONE BITARTRATE AND ACETAMINOPHEN PRN TAB: 5; 325 TABLET ORAL at 10:06

## 2017-05-05 RX ADMIN — FOLIC ACID SCH MLS/HR: 5 INJECTION, SOLUTION INTRAMUSCULAR; INTRAVENOUS; SUBCUTANEOUS at 11:38

## 2017-05-05 RX ADMIN — ENOXAPARIN SODIUM SCH MG: 100 INJECTION SUBCUTANEOUS at 08:57

## 2017-05-05 RX ADMIN — HYDROCODONE BITARTRATE AND ACETAMINOPHEN PRN TAB: 5; 325 TABLET ORAL at 17:50

## 2017-05-05 RX ADMIN — PANTOPRAZOLE SODIUM SCH MG: 40 INJECTION, POWDER, FOR SOLUTION INTRAVENOUS at 05:49

## 2017-05-05 RX ADMIN — HYDROCODONE BITARTRATE AND ACETAMINOPHEN PRN TAB: 5; 325 TABLET ORAL at 21:59

## 2017-05-05 RX ADMIN — FOLIC ACID SCH MLS/HR: 5 INJECTION, SOLUTION INTRAMUSCULAR; INTRAVENOUS; SUBCUTANEOUS at 01:35

## 2017-05-05 NOTE — PN
DATE:  05/05/2017

 

 

The patient's abdominal pain has completely resolved.  She is tolerating p.o. and is having bowel mo
vements. Her abdominal examination is benign.

 

IMPRESSION:  Small-bowel obstruction, resolved.  

 

The patient can be started on a soft diet.  As there are no further surgical recommendations, will s
ign off and see again p.r.n. your request.

 

 

Dictated By: MELVI XIONG/ELOY

DD:    05/05/2017 10:10:29

DT:    05/05/2017 10:33:40

Conf#: [id number]

DID#:  [id number]

## 2017-05-05 NOTE — PN
Date/Time of Note


Date/Time of Note


DATE: 5/5/17 


TIME: 14:55





Assessment/Plan


VTE Prophylaxis


VTE Prophylaxis Intervention:  LMWH





Lines/Catheters


IV Catheter Type (from Nrs):  Peripheral IV


Urinary Cath still in place:  No





Assessment/Plan


Assessment/Plan


1.  Small bowel obstruction, still has abdominal pain with distension, likely 

partial SBO, ordered SBFT


2.  Syncopal episode possibly related to bowel obstruction


3.  History of osteoporosis


4. DVT prophylaxis: lovenox





Subjective


24 Hr Interval Summary


Free Text/Dictation


abdominal pain and distension





Exam/Review of Systems


Vital Signs


Vitals





 Vital Signs








  Date Time  Temp Pulse Resp B/P Pulse Ox O2 Delivery O2 Flow Rate FiO2


 


5/5/17 12:53  69      


 


5/5/17 12:14 98.1  18 117/55 95   


 


5/4/17 12:39      Room Air  














 Intake and Output   


 


 5/4/17 5/4/17 5/5/17





 15:00 23:00 07:00


 


Intake Total  2150 ml 1690 ml


 


Balance  2150 ml 1690 ml











Exam


Constitutional:  alert, oriented, well developed


Psych:  nl mood/affect, no complaints


Head:  atraumatic, normocephalic


Eyes:  EOMI, nl conjunctiva, nl lids


ENMT:  nl external ears & nose, nl lips & teeth, nl nasal mucosa & septum


Neck:  non-tender, supple


Respiratory:  clear to auscultation, normal air movement, 


   No congested cough, No crackles/rales, No diminished breath sounds, No 

intercostal retraction, No labored breathing, No other, No respirations, No 

tactile fremitus, No wheezing


Cardiovascular:  nl pulses, regular rate and rhythm, 


   No S3, No S4, No bruits, No diastolic murmur, No edema, No gallop, No 

irregular rhythm, No jugular venous distention (JVD), No murmurs/extra sounds, 

No other, No rub, No systolic murmur


Gastrointestinal:  distended, tender


Musculoskeletal:  nl extremities to inspection


Extremities:  normal pulses, 


   No calf tenderness, No clubbing, No cyanosis, No edema, No other, No 

palpable cord, No pitting pedal edema, No tenderness


Neurological:  CNS II-XII intact, nl mental status, nl speech, nl strength


Skin:  nl turgor


Lymph:  nl lymph nodes





Results


Result Diagram:  


5/4/17 0751                                                                    

            5/5/17 0640





Results 24 hrs





Laboratory Tests








Test


  5/5/17


06:40


 


Sodium Level 138  


 


Potassium Level 3.8  


 


Chloride Level 109  


 


Carbon Dioxide Level 27  


 


Anion Gap 6  L


 


Blood Urea Nitrogen 6  L


 


Creatinine 0.57  


 


Glucose Level 96  


 


Calcium Level 7.9  L


 


Magnesium Level 1.9  











Medications


Medications





 Current Medications


Sodium Chloride (NS) 1,000 ml @  100 mls/hr Q10H IV  Last administered on 5/5/ 17at 11:38; Admin Dose 100 MLS/HR;  Start 5/3/17 at 00:51


Pantoprazole (Protonix Iv) 40 mg DAILY@06 IV  Last administered on 5/5/17at 05:

49; Admin Dose 40 MG;  Start 5/3/17 at 06:00


Ondansetron HCl (Zofran Inj) 4 mg Q4H  PRN IV NAUSEA AND/OR VOMITING;  Start 5/3

/17 at 01:00


Morphine Sulfate (morphine) 2 mg Q4H  PRN IV PAIN Last administered on 5/3/17at 

18:33; Admin Dose 2 MG;  Start 5/3/17 at 01:00


Acetaminophen/ Hydrocodone Bitart (Norco (5/325)) 1 tab Q4H  PRN PO PAIN Last 

administered on 5/5/17at 10:06; Admin Dose 1 TAB;  Start 5/4/17 at 10:00


Enoxaparin Sodium (Lovenox) 40 mg DAILY SC  Last administered on 5/5/17at 08:57

; Admin Dose 40 MG;  Start 5/4/17 at 15:30











SOURAV FORD MD May 5, 2017 14:58

## 2017-05-06 VITALS — HEART RATE: 81 BPM

## 2017-05-06 VITALS — DIASTOLIC BLOOD PRESSURE: 69 MMHG | SYSTOLIC BLOOD PRESSURE: 128 MMHG | RESPIRATION RATE: 19 BRPM

## 2017-05-06 VITALS — RESPIRATION RATE: 18 BRPM | DIASTOLIC BLOOD PRESSURE: 65 MMHG | SYSTOLIC BLOOD PRESSURE: 113 MMHG

## 2017-05-06 VITALS — RESPIRATION RATE: 18 BRPM | DIASTOLIC BLOOD PRESSURE: 59 MMHG | SYSTOLIC BLOOD PRESSURE: 120 MMHG

## 2017-05-06 VITALS — HEART RATE: 62 BPM

## 2017-05-06 VITALS — RESPIRATION RATE: 15 BRPM | DIASTOLIC BLOOD PRESSURE: 58 MMHG | SYSTOLIC BLOOD PRESSURE: 111 MMHG

## 2017-05-06 VITALS — DIASTOLIC BLOOD PRESSURE: 51 MMHG | SYSTOLIC BLOOD PRESSURE: 91 MMHG | RESPIRATION RATE: 19 BRPM

## 2017-05-06 VITALS — SYSTOLIC BLOOD PRESSURE: 121 MMHG | DIASTOLIC BLOOD PRESSURE: 61 MMHG | RESPIRATION RATE: 15 BRPM

## 2017-05-06 VITALS — HEART RATE: 69 BPM

## 2017-05-06 VITALS — RESPIRATION RATE: 17 BRPM | DIASTOLIC BLOOD PRESSURE: 57 MMHG | SYSTOLIC BLOOD PRESSURE: 104 MMHG

## 2017-05-06 VITALS — HEART RATE: 68 BPM

## 2017-05-06 RX ADMIN — HYDROCODONE BITARTRATE AND ACETAMINOPHEN PRN TAB: 5; 325 TABLET ORAL at 09:55

## 2017-05-06 RX ADMIN — PANTOPRAZOLE SODIUM SCH MG: 40 INJECTION, POWDER, FOR SOLUTION INTRAVENOUS at 05:19

## 2017-05-06 RX ADMIN — FOLIC ACID SCH MLS/HR: 5 INJECTION, SOLUTION INTRAMUSCULAR; INTRAVENOUS; SUBCUTANEOUS at 18:30

## 2017-05-06 RX ADMIN — FOLIC ACID SCH MLS/HR: 5 INJECTION, SOLUTION INTRAMUSCULAR; INTRAVENOUS; SUBCUTANEOUS at 07:50

## 2017-05-06 RX ADMIN — ENOXAPARIN SODIUM SCH MG: 100 INJECTION SUBCUTANEOUS at 08:33

## 2017-05-06 NOTE — PN
Date/Time of Note


Date/Time of Note


DATE: 5/6/17 


TIME: 09:29





Assessment/Plan


VTE Prophylaxis


VTE Prophylaxis Intervention:  heparin





Lines/Catheters


IV Catheter Type (from [facility]):  Peripheral IV


Urinary Cath still in place:  No





Assessment/Plan


Problems:  


(1) Osteoporosis


Status:  Chronic


Comment:  Noted.  No bisphosphonate therapy at this time given her GI tract 

issues


Qualifiers:  


   Osteoporosis type:  unspecified  Presence of current pathological fracture:  

without current pathological fracture  Qualified Code:  M81.0 - Osteoporosis 

without current pathological fracture, unspecified osteoporosis type


(2) SBO (small bowel obstruction)


Status:  Acute


Comment:  This appears to be clearing.  She is scheduled for a small bowel 

follow-through and depending upon the results this may be discharged today or 

more likely tomorrow morning.








Subjective


24 Hr Interval Summary


Free Text/Dictation


Patient had significant nausea yesterday which she reports is better today.  

She is scheduled for a small bowel follow-through today.  Denies abdominal pain


Constitutional:  no complaints


Respiratory:  no complaints


Gastrointestinal:  nausea (Minimal nausea much less), other (No abdominal pain)


Genitourinary:  no complaints





Exam/Review of Systems


Vital Signs


Vitals





 Vital Signs








  Date Time  Temp Pulse Resp B/P Pulse Ox O2 Delivery O2 Flow Rate FiO2


 


5/6/17 08:00  62      


 


5/6/17 07:57 98.4  18 113/65 97   


 


5/4/17 12:39      Room Air  














 Intake and Output   


 


 5/5/17 5/5/17 5/6/17





 15:00 23:00 07:00


 


Intake Total  1950 ml 350 ml


 


Balance  1950 ml 350 ml











Exam


Constitutional:  alert, oriented


Neck:  non-tender, supple


Respiratory:  clear to auscultation, normal air movement


Cardiovascular:  nl pulses, regular rate and rhythm


Gastrointestinal:  nl liver, spleen, other (Vague non-rebound abdominal pain), 

soft





Results


Result Diagram:  


5/4/17 0751                                                                    

            5/5/17 0640








Medications


Medications





 Current Medications


Sodium Chloride (NS) 1,000 ml @  100 mls/hr Q10H IV  Last administered on 5/6/ 17at 07:50; Admin Dose 100 MLS/HR;  Start 5/3/17 at 00:51


Pantoprazole (Protonix Iv) 40 mg DAILY@06 IV  Last administered on 5/6/17at 05:

19; Admin Dose 40 MG;  Start 5/3/17 at 06:00


Ondansetron HCl (Zofran Inj) 4 mg Q4H  PRN IV NAUSEA AND/OR VOMITING Last 

administered on 5/5/17at 21:59; Admin Dose 4 MG;  Start 5/3/17 at 01:00


Morphine Sulfate (morphine) 2 mg Q4H  PRN IV PAIN Last administered on 5/3/17at 

18:33; Admin Dose 2 MG;  Start 5/3/17 at 01:00


Acetaminophen/ Hydrocodone Bitart (Norco (5/325)) 1 tab Q4H  PRN PO PAIN Last 

administered on 5/5/17at 21:59; Admin Dose 1 TAB;  Start 5/4/17 at 10:00


Enoxaparin Sodium (Lovenox) 40 mg DAILY SC  Last administered on 5/6/17at 08:33

; Admin Dose 40 MG;  Start 5/4/17 at 15:30











TOSHIA MAHAJAN MD May 6, 2017 09:30

## 2017-05-07 VITALS — RESPIRATION RATE: 18 BRPM | SYSTOLIC BLOOD PRESSURE: 119 MMHG | DIASTOLIC BLOOD PRESSURE: 72 MMHG

## 2017-05-07 VITALS — SYSTOLIC BLOOD PRESSURE: 109 MMHG | DIASTOLIC BLOOD PRESSURE: 58 MMHG | RESPIRATION RATE: 19 BRPM

## 2017-05-07 VITALS — SYSTOLIC BLOOD PRESSURE: 105 MMHG | RESPIRATION RATE: 18 BRPM | DIASTOLIC BLOOD PRESSURE: 65 MMHG

## 2017-05-07 VITALS — SYSTOLIC BLOOD PRESSURE: 107 MMHG | DIASTOLIC BLOOD PRESSURE: 55 MMHG | RESPIRATION RATE: 19 BRPM

## 2017-05-07 VITALS — HEART RATE: 69 BPM

## 2017-05-07 VITALS — HEART RATE: 66 BPM

## 2017-05-07 VITALS — HEART RATE: 74 BPM

## 2017-05-07 VITALS — HEART RATE: 79 BPM

## 2017-05-07 VITALS — SYSTOLIC BLOOD PRESSURE: 109 MMHG | DIASTOLIC BLOOD PRESSURE: 55 MMHG | RESPIRATION RATE: 19 BRPM

## 2017-05-07 VITALS — HEART RATE: 77 BPM

## 2017-05-07 VITALS — HEART RATE: 67 BPM

## 2017-05-07 RX ADMIN — FOLIC ACID SCH MLS/HR: 5 INJECTION, SOLUTION INTRAMUSCULAR; INTRAVENOUS; SUBCUTANEOUS at 05:59

## 2017-05-07 RX ADMIN — FOLIC ACID SCH MLS/HR: 5 INJECTION, SOLUTION INTRAMUSCULAR; INTRAVENOUS; SUBCUTANEOUS at 10:59

## 2017-05-07 RX ADMIN — ENOXAPARIN SODIUM SCH MG: 100 INJECTION SUBCUTANEOUS at 08:37

## 2017-05-07 RX ADMIN — HYDROCODONE BITARTRATE AND ACETAMINOPHEN PRN TAB: 5; 325 TABLET ORAL at 13:11

## 2017-05-07 RX ADMIN — PANTOPRAZOLE SODIUM SCH MG: 40 INJECTION, POWDER, FOR SOLUTION INTRAVENOUS at 05:59

## 2017-05-07 NOTE — PN
Date/Time of Note


Date/Time of Note


DATE: 5/7/17 


TIME: 14:24





Assessment/Plan


VTE Prophylaxis


VTE Prophylaxis Intervention:  heparin





Lines/Catheters


IV Catheter Type (from Nrsg):  Peripheral IV


Urinary Cath still in place:  No





Assessment/Plan


Problems:  


(1) History of small bowel obstruction


Status:  Chronic


Comment:  noted





(2) SBO (small bowel obstruction)


Status:  Acute


Comment:  Patient is not doing as well due to pain. SBFT no data available 

called Dr. Encinas to get data small bowel is somewhat dilated partial sbo








Subjective


24 Hr Interval Summary


Constitutional:  no complaints


Respiratory:  no complaints


Cardiovascular:  no complaints


Gastrointestinal:  no complaints





Exam/Review of Systems


Vital Signs


Vitals





 Vital Signs








  Date Time  Temp Pulse Resp B/P Pulse Ox O2 Delivery O2 Flow Rate FiO2


 


5/7/17 12:36  79      


 


5/7/17 11:55 98.0  18 119/72 98   


 


5/4/17 12:39      Room Air  














 Intake and Output   


 


 5/6/17 5/6/17 5/7/17





 15:00 23:00 07:00


 


Intake Total  1800 ml 1700 ml


 


Balance  1800 ml 1700 ml











Exam


Constitutional:  alert, oriented


Respiratory:  clear to auscultation, normal air movement


Cardiovascular:  nl pulses, regular rate and rhythm


Gastrointestinal:  bowel sounds, nl liver, spleen, non-tender, soft





Results


Result Diagram:  


5/4/17 0751                                                                    

            5/5/17 0640








Medications


Medications





 Current Medications


Sodium Chloride (NS) 1,000 ml @  100 mls/hr Q10H IV  Last administered on 5/7/ 17at 10:59; Admin Dose 100 MLS/HR;  Start 5/3/17 at 00:51


Pantoprazole (Protonix Iv) 40 mg DAILY@06 IV  Last administered on 5/7/17at 05:

59; Admin Dose 40 MG;  Start 5/3/17 at 06:00


Ondansetron HCl (Zofran Inj) 4 mg Q4H  PRN IV NAUSEA AND/OR VOMITING Last 

administered on 5/5/17at 21:59; Admin Dose 4 MG;  Start 5/3/17 at 01:00


Morphine Sulfate (morphine) 2 mg Q4H  PRN IV PAIN Last administered on 5/3/17at 

18:33; Admin Dose 2 MG;  Start 5/3/17 at 01:00


Acetaminophen/ Hydrocodone Bitart (Norco (5/325)) 1 tab Q4H  PRN PO PAIN Last 

administered on 5/7/17at 13:11; Admin Dose 1 TAB;  Start 5/4/17 at 10:00


Enoxaparin Sodium (Lovenox) 40 mg DAILY SC  Last administered on 5/7/17at 08:37

; Admin Dose 40 MG;  Start 5/4/17 at 15:30











TOSHIA MAHAJAN MD May 7, 2017 14:28

## 2017-05-07 NOTE — RADRPT
PROCEDURE:   Small bowel follow-through.  

 

CLINICAL INDICATION:   Abdomen pain.  

 

TECHNIQUE:   Water-soluble contrast was administered orally and several spot and overhead radiograph
s of the abdomen were obtained. 

 

COMPARISON:   Small bowel follow-through dated 12/12/2016.  CT scan of the abdomen and pelvis dated 
05/02/2017. 

 

FINDINGS:

On the preliminary radiograph, contrast is present in the transverse colon and right side of the col
on from the prior CT scan.  Multiple surgical clips are present in the abdomen and pelvis.  There is
 dilated small bowel in the left upper quadrant of the abdomen measuring up to 6.3 cm in diameter. 

There is no small bowel displacement or mass. The small bowel folds are normal. 

There is no evidence of gastric outlet obstruction.  Contrast enters the small bowel in the dilated 
left upper quadrant.  2-hour delayed images demonstrate contrast in the distal small bowel and colon
.  A followup image approximately 24 hours later demonstrates contrast throughout the colon.  All of
 the contrast has left the small bowel.  The small bowel in the left upper quadrant is improved consuelo
uring 4 cm in diameter on the delayed image. 

IMPRESSION:

1.  Prior abdominal and pelvic surgery.

2.  Dilated small bowel in the left upper quadrant which improves on delayed image.

3.  No contrast remaining in the small bowel on the 24-hour delayed image.  

 

RPTAT: QQ

_____________________________________________ 

.Forest Encinas MD, MD           Date    Time 

Electronically viewed and signed by .Forest Encinas MD, MD on 05/07/2017 15:44 

 

D:  05/07/2017 15:44  T:  05/07/2017 15:44

.R/

## 2017-05-08 VITALS — DIASTOLIC BLOOD PRESSURE: 51 MMHG | RESPIRATION RATE: 18 BRPM | SYSTOLIC BLOOD PRESSURE: 107 MMHG

## 2017-05-08 VITALS — RESPIRATION RATE: 16 BRPM | SYSTOLIC BLOOD PRESSURE: 133 MMHG | DIASTOLIC BLOOD PRESSURE: 76 MMHG

## 2017-05-08 VITALS — HEART RATE: 74 BPM

## 2017-05-08 VITALS — DIASTOLIC BLOOD PRESSURE: 67 MMHG | RESPIRATION RATE: 18 BRPM | SYSTOLIC BLOOD PRESSURE: 121 MMHG

## 2017-05-08 VITALS — RESPIRATION RATE: 18 BRPM | DIASTOLIC BLOOD PRESSURE: 61 MMHG | SYSTOLIC BLOOD PRESSURE: 114 MMHG

## 2017-05-08 VITALS — DIASTOLIC BLOOD PRESSURE: 66 MMHG | SYSTOLIC BLOOD PRESSURE: 126 MMHG | RESPIRATION RATE: 20 BRPM

## 2017-05-08 VITALS — HEART RATE: 64 BPM

## 2017-05-08 VITALS — RESPIRATION RATE: 18 BRPM | SYSTOLIC BLOOD PRESSURE: 145 MMHG | DIASTOLIC BLOOD PRESSURE: 84 MMHG

## 2017-05-08 VITALS — HEART RATE: 61 BPM

## 2017-05-08 VITALS — SYSTOLIC BLOOD PRESSURE: 129 MMHG | DIASTOLIC BLOOD PRESSURE: 71 MMHG | RESPIRATION RATE: 70 BRPM

## 2017-05-08 VITALS — HEART RATE: 63 BPM

## 2017-05-08 VITALS — HEART RATE: 66 BPM

## 2017-05-08 RX ADMIN — FOLIC ACID SCH MLS/HR: 5 INJECTION, SOLUTION INTRAMUSCULAR; INTRAVENOUS; SUBCUTANEOUS at 19:57

## 2017-05-08 RX ADMIN — DOCUSATE SODIUM SCH MG: 100 CAPSULE, LIQUID FILLED ORAL at 19:56

## 2017-05-08 RX ADMIN — FOLIC ACID SCH MLS/HR: 5 INJECTION, SOLUTION INTRAMUSCULAR; INTRAVENOUS; SUBCUTANEOUS at 22:34

## 2017-05-08 RX ADMIN — HYDROCODONE BITARTRATE AND ACETAMINOPHEN PRN TAB: 5; 325 TABLET ORAL at 20:01

## 2017-05-08 RX ADMIN — FOLIC ACID SCH MLS/HR: 5 INJECTION, SOLUTION INTRAMUSCULAR; INTRAVENOUS; SUBCUTANEOUS at 05:30

## 2017-05-08 RX ADMIN — HYDROCODONE BITARTRATE AND ACETAMINOPHEN PRN TAB: 5; 325 TABLET ORAL at 10:51

## 2017-05-08 RX ADMIN — ENOXAPARIN SODIUM SCH MG: 100 INJECTION SUBCUTANEOUS at 07:45

## 2017-05-08 RX ADMIN — PANTOPRAZOLE SODIUM SCH MG: 40 INJECTION, POWDER, FOR SOLUTION INTRAVENOUS at 05:30

## 2017-05-08 RX ADMIN — FOLIC ACID SCH MLS/HR: 5 INJECTION, SOLUTION INTRAMUSCULAR; INTRAVENOUS; SUBCUTANEOUS at 10:51

## 2017-05-08 RX ADMIN — POLYETHYLENE GLYCOL 3350 SCH GM: 17 POWDER, FOR SOLUTION ORAL at 19:56

## 2017-05-08 RX ADMIN — POLYETHYLENE GLYCOL 3350 SCH GM: 17 POWDER, FOR SOLUTION ORAL at 15:55

## 2017-05-08 NOTE — PN
Date/Time of Note


Date/Time of Note


DATE: 5/8/17 


TIME: 15:02





Assessment/Plan


VTE Prophylaxis


VTE Prophylaxis Intervention:  LMWH





Lines/Catheters


IV Catheter Type (from Nrsg):  Peripheral IV


Urinary Cath still in place:  No





Assessment/Plan


Chief Complaint/Hosp Course


s- less abd pain. no n/v/fever. tolerating liquids. drinks 8 glasses fluids/ 

day. ho djd lumbar dz ~8yrs.





o- vss





pe


no pallor


reg


ctab


bs+ nt nd; no r/r/g


no edema


back- no point tenderness/ warmth





A/P


1) SBO; stable; advance diet.


2) Adhesions.


3) Syncope. vasovagal due to pain?


4) L4 Fracture; Mri/ corset


5) Ho Ovarian Ca; ct w contrast.


6) Djd/ Op


7) Anemia


Problems:  





Exam/Review of Systems


Vital Signs


Vitals





 Vital Signs








  Date Time  Temp Pulse Resp B/P Pulse Ox O2 Delivery O2 Flow Rate FiO2


 


5/8/17 12:22  66      


 


5/8/17 12:10 99.0  18 121/67 99   


 


5/4/17 12:39      Room Air  














 Intake and Output   


 


 5/7/17 5/7/17 5/8/17





 15:00 23:00 07:00


 


Intake Total  750 ml 1600 ml


 


Balance  750 ml 1600 ml











Results


Result Diagram:  


5/4/17 0751                                                                    

            5/5/17 0640








Medications


Medications





 Current Medications


Sodium Chloride (NS) 1,000 ml @  75 mls/hr [id number] IV  Last administered on 5/8/ 17at 05:30; Admin Dose 100 MLS/HR;  Start 5/3/17 at 00:51


Ondansetron HCl (Zofran Inj) 4 mg Q4H  PRN IV NAUSEA AND/OR VOMITING Last 

administered on 5/5/17at 21:59; Admin Dose 4 MG;  Start 5/3/17 at 01:00


Morphine Sulfate (morphine) 2 mg Q4H  PRN IV PAIN Last administered on 5/3/17at 

18:33; Admin Dose 2 MG;  Start 5/3/17 at 01:00


Acetaminophen/ Hydrocodone Bitart (Norco (5/325)) 1 tab Q4H  PRN PO PAIN Last 

administered on 5/8/17at 10:51; Admin Dose 1 TAB;  Start 5/4/17 at 10:00


Enoxaparin Sodium (Lovenox) 40 mg DAILY SC  Last administered on 5/8/17at 07:45

; Admin Dose 40 MG;  Start 5/4/17 at 15:30


Famotidine (Pepcid Iv) 20 mg DAILY IV ;  Start 5/9/17 at 09:00


Enoxaparin Sodium (Lovenox) 30 mg DAILY SC ;  Start 5/8/17 at 15:00


Ibuprofen (Motrin) 800 mg Q6H  PRN PO MODERATE PAIN LEVEL 4-6;  Start 5/8/17 at 

15:00


Polyethylene Glycol (Miralax) 17 gm BID PO ;  Start 5/8/17 at 15:00











TITI DAHL MD May 8, 2017 15:05

## 2017-05-09 VITALS — SYSTOLIC BLOOD PRESSURE: 124 MMHG | RESPIRATION RATE: 20 BRPM | DIASTOLIC BLOOD PRESSURE: 71 MMHG

## 2017-05-09 LAB
ADD SCAN DIFF: NO
ALBUMIN SERPL-MCNC: 3.8 G/DL (ref 3.3–4.9)
ALBUMIN/GLOB SERPL: 1.22 {RATIO}
ALP SERPL-CCNC: 54 IU/L (ref 42–121)
ALT SERPL-CCNC: 61 IU/L (ref 13–69)
ANION GAP SERPL CALC-SCNC: 9 MMOL/L (ref 8–16)
AST SERPL-CCNC: 52 IU/L (ref 15–46)
BASOPHILS # BLD AUTO: 0 10^3/UL (ref 0–0.1)
BASOPHILS NFR BLD: 0.3 % (ref 0–2)
BILIRUB DIRECT SERPL-MCNC: 0 MG/DL (ref 0–0.2)
BILIRUB SERPL-MCNC: 0.4 MG/DL (ref 0.2–1.3)
BUN SERPL-MCNC: 10 MG/DL (ref 7–20)
CALCIUM SERPL-MCNC: 9 MG/DL (ref 8.4–10.2)
CHLORIDE SERPL-SCNC: 105 MMOL/L (ref 97–110)
CO2 SERPL-SCNC: 28 MMOL/L (ref 21–31)
CREAT SERPL-MCNC: 0.56 MG/DL (ref 0.44–1)
EOSINOPHIL # BLD: 0 10^3/UL (ref 0–0.5)
EOSINOPHIL NFR BLD: 1 % (ref 0–7)
ERYTHROCYTE [DISTWIDTH] IN BLOOD BY AUTOMATED COUNT: 13.9 % (ref 11.5–14.5)
GLOBULIN SER-MCNC: 3.1 G/DL (ref 1.3–3.2)
GLUCOSE SERPL-MCNC: 95 MG/DL (ref 70–220)
HCT VFR BLD CALC: 31.2 % (ref 37–47)
HGB BLD-MCNC: 10.4 G/DL (ref 12–16)
LYMPHOCYTES # BLD AUTO: 1.3 10^3/UL (ref 0.8–2.9)
LYMPHOCYTES NFR BLD AUTO: 33.7 % (ref 15–51)
MAGNESIUM SERPL-MCNC: 1.6 MG/DL (ref 1.7–2.5)
MCH RBC QN AUTO: 31.4 PG (ref 29–33)
MCHC RBC AUTO-ENTMCNC: 33.3 G/DL (ref 32–37)
MCV RBC AUTO: 94.3 FL (ref 82–101)
MONOCYTES # BLD: 0.3 10^3/UL (ref 0.3–0.9)
MONOCYTES NFR BLD: 7.8 % (ref 0–11)
NEUTROPHILS # BLD: 2.2 10^3/UL (ref 1.6–7.5)
NEUTROPHILS NFR BLD AUTO: 56.9 % (ref 39–77)
NRBC # BLD MANUAL: 0 10^3/UL (ref 0–0)
NRBC BLD QL: 0 /100WBC (ref 0–0)
PHOSPHATE SERPL-MCNC: 3.6 MG/DL (ref 2.5–4.9)
PLATELET # BLD: 259 10^3/UL (ref 140–415)
PMV BLD AUTO: 9.6 FL (ref 7.4–10.4)
POTASSIUM SERPL-SCNC: 3.5 MMOL/L (ref 3.5–5.1)
PROT SERPL-MCNC: 6.9 G/DL (ref 6.1–8.1)
RBC # BLD AUTO: 3.31 10^6/UL (ref 4.2–5.4)
SODIUM SERPL-SCNC: 138 MMOL/L (ref 135–144)
TSH SERPL-ACNC: 4.34 MIU/L (ref 0.47–4.68)
WBC # BLD AUTO: 3.9 10^3/UL (ref 4.8–10.8)

## 2017-05-09 RX ADMIN — POLYETHYLENE GLYCOL 3350 SCH GM: 17 POWDER, FOR SOLUTION ORAL at 14:08

## 2017-05-09 RX ADMIN — DOCUSATE SODIUM SCH MG: 100 CAPSULE, LIQUID FILLED ORAL at 14:08

## 2017-05-09 RX ADMIN — ENOXAPARIN SODIUM SCH MG: 100 INJECTION SUBCUTANEOUS at 10:21

## 2017-05-09 RX ADMIN — HYDROCODONE BITARTRATE AND ACETAMINOPHEN PRN TAB: 5; 325 TABLET ORAL at 14:09

## 2017-05-09 NOTE — RADRPT
PROCEDURE:  MRI lumbar spine without and with contrast 

 

CLINICAL INDICATION:  L4 fracture, history of cancer 

 

TECHNIQUE:  Multiplanar MRI of the lumbar spine without and with contrast was performed on a 3.0 T [facility] utilizing the following sequences: T1-weighted, T2-weighted, postcontrast T1-weighted.  10 cc
 Magnevist intravenous contrast was administered.

 

COMPARISON:  CT abdomen pelvis 05/02/2017, 04/26/2013 

 

FINDINGS:

There is preservation of the lordosis of the lumbar spine.  Alignment is intact.  There is a moderat
e chronic L5 compression deformity, with mild deformity of the superior aspect of the posterior anila
ex; no associated osseous lesion is seen and this is stable over time compared to the CTs.  No suspi
cious lesions are identified throughout the lumbar spine.  Noted are fatty marrow signal changes in 
the L5 and the imaged bony pelvis compatible with postradiation changes.  Anterior spondylosis is se
en at multiple levels.  There are type 1 modic changes at L1-2.  There is multilevel decreased disk 
intranuclear T2-weighted signal intensity with disk space narrowing, mild at L1-2 and L3-4. The tip 
of the conus medullaris is visible at the L1 level and appears unremarkable.  No abnormal medullary 
or leptomeningeal enhancement is identified. 

 

Noted is irregularity with decreased signal intensity at the right iliac bone, next to the sacroilia
c joint without enhancement.  This area appears somewhat more sclerotic compared to the prior CT fro
m 2013.  The etiology is unclear, possibly post-traumatic sequela.  Also noted is a smaller focal ar
ea of mixed signal intensity in the upper left sacral ala measuring up to about 2 cm with associated
 enhancement, and suggestion of coarsened trabecula which may reflect a hemangioma; this area appear
s nonaggressive and stable over time on the CTs.

 

T12-L1: No disc bulge or herniation is identified.  There is no central canal stenosis or foraminal 
narrowing. 

 

L1-L2:  There is a 2 mm left central disk protrusion.  No disc bulge or herniation is identified.  T
here is no central canal stenosis or foraminal narrowing. 

 

L2-L3:  No disc bulge or herniation is identified.  There is no central canal stenosis or foraminal 
narrowing. 

 

L3-L4:  There is minimal posterior disk bulging.  There is mild facet arthropathy.  There is no cent
ral canal stenosis or foraminal narrowing. 

 

L4-L5:  There is a broad posterior disk/osteophyte along with mild chronic deformity of the superior
 posterior L5 cortex, slightly more pronounced centrally.  There is facet arthropathy with ligamentu
m flavum hypertrophy.  There is mild central canal stenosis.  There is mild to moderate bilateral fo
raminal narrowing. 

 

L5-S1:  There is minimal posterior disk bulging.  There is mild facet arthropathy with ligamentum fl
avum hypertrophy.  There is no central canal stenosis.  There is mild to moderate bilateral foramina
l narrowing.   

 

 

IMPRESSION:

1.  Moderate chronic L5 compression deformity, without findings suspicious for metastatic disease to
 the lumbar spine identified.

2.  Postradiation changes at L5 and the bony pelvis.  Area of decreased signal intensity in the righ
t iliac bone without enhancement noted, possibly post-traumatic sequela.  Also focal mixed signal in
tensity lesion with enhancement in the left sacrum, which may be a hemangioma.

3.  Lumbar spondylosis.

4.  Mild central canal stenosis at L4-5.

5.  Small disk protrusion at L1-2 without central stenosis.

6.  Mild to moderate bilateral foraminal narrowing at L4-5 and L5-S1.

 

 

RPTAT: VV

_____________________________________________ 

.David Eaton MD, MD           Date    Time 

Electronically viewed and signed by .David Eaton MD, MD on 05/09/2017 09:54 

 

D:  05/09/2017 09:54  T:  05/09/2017 09:54

.O/

## 2017-05-09 NOTE — PN
DATE:  05/08/2017

 

 

FOLLOWUP EVALUATION:  The patient states that she is markedly improved, has no abdominal pain and is
 tolerating p.o. with normal bowel function.  

 

IMPRESSION:  No further surgical recommendations.  Discharge when medically cleared.

 

 

Dictated By: MELVI XIONG/ELOY

DD:    05/08/2017 20:36:47

DT:    05/09/2017 03:23:14

Conf#: [id number]

DID#:  [id number]

## 2017-05-09 NOTE — RADRPT
PROCEDURE:   CT Abdomen and Pelvis with contrast. 

 

CLINICAL INDICATION:   Abdominal pain and distension.  Evaluate for small bowel obstruction.  Histor
y of ovarian cancer.

 

TECHNIQUE:   CT scan of the abdomen and pelvis with contrast was performed on a multi-detector high-
resolution CT scanner.  The patient was scanned following the uncomplicated intravenous administrati
on of 100 cc of Omnipaque 300.  Coronal and sagittal reformatted images were obtained from the axial
 source images. Images were reviewed on a high-resolution PACS workstation. The total exam CTDI equa
ls 8.61 mGy and the total exam DLP equals 473.77 mGy-cm.

 

One or more of the following dose reduction techniques were used:

Automated exposure control.

Adjustment of the mA and/or kV according to patient size.

Use of iterative reconstruction technique.

 

COMPARISON:   CT abdomen and pelvis 05/02/2017

 

FINDINGS:

 

CT abdomen:

 

The lung bases are remarkable for subsegmental atelectasis in the left lung base with linear pleurop
arenchymal scarring.  The heart size is normal, without pericardial thickening or effusion.  The chapin
er is normal in size and density without focal mass or intrahepatic biliary dilatation.  The spleen 
is normal in size and homogeneous in density.  The stomach is partially collapsed, but is grossly un
remarkable.  The pancreas as visualized is normal.  The gallbladder .  There is no evidence for bili
char dilatation.  The adrenal glands are symmetric and normal.  The kidneys are symmetrically unremar
kable as well.  No renal calculus or obstructive uropathy or mass lesion is seen.  

 

The aorta is of normal caliber.  Aortic vascular calcifications are present.  There is no retroperit
martin lymphadenopathy.  The yudi hepatis region is clear.  The bowel and mesentery, as visualized, 
are equally unremarkable. There is a fat-containing infraumbilical ventral abdominal hernia.

 

CT pelvis:

 

The small bowel loops situated within the pelvis are unremarkable.  The patient is status post hyste
rectomy. The pelvic sidewalls and inguinal regions are clear.  The sigmoid colon and rectum are edelmira
rkable for sigmoid diverticulosis.  No mass, lymphadenopathy, or free fluid is seen.  No acute infla
mmation is seen. There is moderately distended urinary bladder without focal abnormal wall thickenin
g. The surrounding osseous structures are remarkable for stable moderate chronic compression fractur
e of L5.  Old right pelvic fracture is again noted.  No osteolytic or osteoblastic lesion is detecte
d.  

 

IMPRESSION:

 

1.  No mass, lymphadenopathy, or focal acute inflammatory process is identified.

2.  Normal caliber loops of small bowel with no small bowel obstruction.

3.  Fat containing infraumbilical ventral abdominal hernia.

4.  Status post hysterectomy.  No pelvic mass.  No free fluid in the pelvis.

5.  Scattered aortoiliac atherosclerosis.

6.  Unchanged moderate compression fracture of L5.

  

 

RPTAT: BB

_____________________________________________ 

.Mirela Juarez MD, MD           Date    Time 

Electronically viewed and signed by .Mirela Juarez MD, MD on 05/09/2017 16:09 

 

D:  05/09/2017 16:09  T:  05/09/2017 16:09

.O/

## 2017-05-09 NOTE — PDOCDIS
Discharge Instructions


DIAGNOSIS


Discharge Diagnosis:  partial bowel obstruction





CONDITION


Patient Condition:  Good





HOME CARE INSTRUCTIONS:


Special Diet:  MECH SOFT/regular





ACTIVITY:








Activity Restrictions:   Slowly Increase Activity





 Avoid heavy lifting











FOLLOW UP/APPOINTMENTS


Appointments


pcp 1wTITI Yuan MD May 9, 2017 17:21

## 2017-05-09 NOTE — PDOCDIS
Discharge Instructions


DIAGNOSIS


Discharge Diagnosis:  partial bowel obstruction





CONDITION


Patient Condition:  Good





HOME CARE INSTRUCTIONS:


Special Diet:  MECH SOFT/regular





ACTIVITY:








Activity Restrictions:   Slowly Increase Activity





 Avoid heavy lifting











FOLLOW UP/APPOINTMENTS


Appointments


pcp 1wTITI Yuan MD May 9, 2017 17:22

## 2017-05-09 NOTE — DS
DATE OF ADMISSION: 05/02/2017

DATE OF DISCHARGE: 05/09/2017

 

PRIMARY CARE PHYSICIAN:  Unknown.

 

CONSULTANT:  Melvi Yeager MD

 

DIAGNOSIS ON ADMISSION:  Partial small-bowel obstruction.

 

DIAGNOSES ON DISCHARGE:

1.  Partial small-bowel obstruction.

2.  Adhesions.

3.  Syncope, probably vasovagal.

4.  L4 fracture.

 

HOSPITAL COURSE:  This is a 66-year-old female admitted with abdominal pain.  No known aggravating o
r relieving factors.  She drinks plenty of fluids a day.  She tries to avoid constipation.  She is n
ot passing any blood.  She has had similar discomfort in the past.  The patient was treated conserva
tively.  NG removed.  The patient was seen by general surgery.  She is presently stable, tolerating 
diet, pain is controlled and she is stable and fit for discharge.  Etiology is probably adhesions wh
ich have resolved with conservative measures.  I did a CAT scan with contrast which did not show any
 tumor or fluid collections, etc.  Recommend she avoid dehydration and constipation, etc.  Stable an
d fit for discharge.

 

History of ovarian cancer.  Present CAT scan with contrast did not show any disease.

 

Syncope, probably vasovagal due to pain.  Nonfocal.  No arrhythmias.  No repeat events.  EKG okay.

 

L4 fracture.  This is not acute.  Due to pain, I will prescribe some Motrin.  Obtain a lumbar brace 
or corset for discomfort.

 

Anemia, stable.

 

DISCHARGE PLAN:  Home.  Follow up with primary in 1 week, and Dr. Yeager as needed.

 

DIET:  Regular.

 

ACTIVITY:  No heavy lifting.

 

DURABLE MEDICAL EQUIPMENT:  Lumbar corset. 

 

CODE STATUS:  FULL. 

 

CONDITION:  Stable, good.

 

BARRIERS TO DISCHARGE:  None.

 

PENDING TESTS:  None.

 

FUNCTIONAL STATUS:  The patient is awake, alert, agrees with plan of care.

 

REASON FOR ADMISSION:  Abdominal pain.

 

ALLERGIES:  NO KNOWN DRUG ALLERGIES.

 

CONTINUED MEDICATIONS:

1.  Fosamax 70 once a week.  

2.  Colace 100 b.i.d.

 

NEW MEDICATION:  Motrin 800 mg every 6 hours as needed for pain, 30 tablets, no refills.

 

LABORATORY DATA:  White cell count of 3.9, hemoglobin and hematocrit of 10 and 31, platelets of 259.
  INR 0.9.  Trace ketones seen.  Sodium 138, potassium 3.5, chloride 105, bicarbonate 28, BUN of 10,
 creatinine 0.5, glucose of 95, calcium 9.  Phosphorus of 3.6, magnesium 1.6, bilirubin 0.4, AST and
 ALT of 52 and 61, alkaline phosphatase of 54.  Protein of 6.9, albumin of 3.8.  TSH of 4.3.  Lipase
 of 60.  Blood and urine cultures unremarkable.  

 

IMAGING:  CAT scan with contrast demonstrates normal bowel at this time.  There are fat-containing i
nfraumbilical, ventral, abdominal hernias.  Hysterectomy status.  Scattered aortoiliac atheroscleros
is.  Moderate compression fracture of L5, which on MRI shows moderate chronic  compression deformity
, postradiation changes of L5 and pelvis.  Mixed focal signal density enhancement in the left sacrum
 which may be a hemangioma.  Posttraumatic sequelae probably of an area of decreased signal intensit
y in the right iliac bone without enhancement.  Lumbar spondylosis.  Mild central canal stenosis L4 
to L5, small disk protrusion L1 to L2, mild to moderate bilateral foraminal narrowing at L4 to L5 an
d L5 to S1.

 

Carotid ultrasound no acute process.  CAT scan of brain:  No acute process.  2D echo no acute proces
s, EF of 60, concern for impaired relaxation diastolic dysfunction.

 

 

Dictated By: TITI DAHL MD

 

AC/NTS

DD:    05/09/2017 17:30:23

DT:    05/09/2017 18:28:54

Conf#: [id number]

DID#:  [id number]

CC: MELVI YEAGER MD;*EndCC*

## 2017-10-19 ENCOUNTER — HOSPITAL ENCOUNTER (INPATIENT)
Dept: HOSPITAL 10 - E/R | Age: 67
LOS: 5 days | Discharge: HOME | DRG: 390 | End: 2017-10-24
Payer: COMMERCIAL

## 2017-10-19 VITALS
BODY MASS INDEX: 26.06 KG/M2 | BODY MASS INDEX: 26.06 KG/M2 | WEIGHT: 138.01 LBS | WEIGHT: 138.01 LBS | HEIGHT: 61 IN | HEIGHT: 61 IN

## 2017-10-19 DIAGNOSIS — E66.3: ICD-10-CM

## 2017-10-19 DIAGNOSIS — M81.0: ICD-10-CM

## 2017-10-19 DIAGNOSIS — E88.09: ICD-10-CM

## 2017-10-19 DIAGNOSIS — K43.2: ICD-10-CM

## 2017-10-19 DIAGNOSIS — D64.9: ICD-10-CM

## 2017-10-19 DIAGNOSIS — E83.51: ICD-10-CM

## 2017-10-19 DIAGNOSIS — K56.609: Primary | ICD-10-CM

## 2017-10-19 PROCEDURE — 81003 URINALYSIS AUTO W/O SCOPE: CPT

## 2017-10-19 PROCEDURE — 84484 ASSAY OF TROPONIN QUANT: CPT

## 2017-10-19 PROCEDURE — 74176 CT ABD & PELVIS W/O CONTRAST: CPT

## 2017-10-19 PROCEDURE — 85610 PROTHROMBIN TIME: CPT

## 2017-10-19 PROCEDURE — 83690 ASSAY OF LIPASE: CPT

## 2017-10-19 PROCEDURE — 36415 COLL VENOUS BLD VENIPUNCTURE: CPT

## 2017-10-19 PROCEDURE — 80053 COMPREHEN METABOLIC PANEL: CPT

## 2017-10-19 PROCEDURE — 83735 ASSAY OF MAGNESIUM: CPT

## 2017-10-19 PROCEDURE — 85730 THROMBOPLASTIN TIME PARTIAL: CPT

## 2017-10-19 PROCEDURE — C9113 INJ PANTOPRAZOLE SODIUM, VIA: HCPCS

## 2017-10-19 PROCEDURE — 96374 THER/PROPH/DIAG INJ IV PUSH: CPT

## 2017-10-19 PROCEDURE — 85025 COMPLETE CBC W/AUTO DIFF WBC: CPT

## 2017-10-19 PROCEDURE — 90686 IIV4 VACC NO PRSV 0.5 ML IM: CPT

## 2017-10-19 PROCEDURE — 71010: CPT

## 2017-10-19 PROCEDURE — 84100 ASSAY OF PHOSPHORUS: CPT

## 2017-10-19 PROCEDURE — 96375 TX/PRO/DX INJ NEW DRUG ADDON: CPT

## 2017-10-20 VITALS — SYSTOLIC BLOOD PRESSURE: 104 MMHG | DIASTOLIC BLOOD PRESSURE: 58 MMHG | RESPIRATION RATE: 16 BRPM

## 2017-10-20 VITALS — SYSTOLIC BLOOD PRESSURE: 107 MMHG | DIASTOLIC BLOOD PRESSURE: 73 MMHG | RESPIRATION RATE: 20 BRPM

## 2017-10-20 VITALS — SYSTOLIC BLOOD PRESSURE: 123 MMHG | RESPIRATION RATE: 20 BRPM | DIASTOLIC BLOOD PRESSURE: 68 MMHG

## 2017-10-20 VITALS — DIASTOLIC BLOOD PRESSURE: 62 MMHG | RESPIRATION RATE: 18 BRPM | SYSTOLIC BLOOD PRESSURE: 110 MMHG | HEART RATE: 68 BPM

## 2017-10-20 LAB
ABNORMAL IP MESSAGE: 1
ALBUMIN SERPL-MCNC: 3.9 G/DL (ref 3.3–4.9)
ALBUMIN SERPL-MCNC: 4.3 G/DL (ref 3.3–4.9)
ALBUMIN/GLOB SERPL: 1.05 {RATIO}
ALBUMIN/GLOB SERPL: 1.07 {RATIO}
ALP SERPL-CCNC: 54 IU/L (ref 42–121)
ALP SERPL-CCNC: 55 IU/L (ref 42–121)
ALT SERPL-CCNC: 25 IU/L (ref 13–69)
ALT SERPL-CCNC: 25 IU/L (ref 13–69)
ANION GAP SERPL CALC-SCNC: 12 MMOL/L (ref 8–16)
ANION GAP SERPL CALC-SCNC: 13 MMOL/L (ref 8–16)
APTT BLD: 24.5 SEC (ref 25–35)
AST SERPL-CCNC: 25 IU/L (ref 15–46)
AST SERPL-CCNC: 33 IU/L (ref 15–46)
BASOPHILS # BLD AUTO: 0 10^3/UL (ref 0–0.1)
BASOPHILS NFR BLD: 0.2 % (ref 0–2)
BILIRUB DIRECT SERPL-MCNC: 0 MG/DL (ref 0–0.2)
BILIRUB DIRECT SERPL-MCNC: 0 MG/DL (ref 0–0.2)
BILIRUB SERPL-MCNC: 0.8 MG/DL (ref 0.2–1.3)
BILIRUB SERPL-MCNC: 1 MG/DL (ref 0.2–1.3)
BUN SERPL-MCNC: 14 MG/DL (ref 7–20)
BUN SERPL-MCNC: 14 MG/DL (ref 7–20)
CALCIUM SERPL-MCNC: 10 MG/DL (ref 8.4–10.2)
CALCIUM SERPL-MCNC: 9.6 MG/DL (ref 8.4–10.2)
CHLORIDE SERPL-SCNC: 101 MMOL/L (ref 97–110)
CHLORIDE SERPL-SCNC: 99 MMOL/L (ref 97–110)
CO2 SERPL-SCNC: 28 MMOL/L (ref 21–31)
CO2 SERPL-SCNC: 28 MMOL/L (ref 21–31)
CREAT SERPL-MCNC: 0.62 MG/DL (ref 0.44–1)
CREAT SERPL-MCNC: 0.67 MG/DL (ref 0.44–1)
EOSINOPHIL # BLD: 0 10^3/UL (ref 0–0.5)
EOSINOPHIL NFR BLD: 0 % (ref 0–7)
ERYTHROCYTE [DISTWIDTH] IN BLOOD BY AUTOMATED COUNT: 13.9 % (ref 11.5–14.5)
GLOBULIN SER-MCNC: 3.7 G/DL (ref 1.3–3.2)
GLOBULIN SER-MCNC: 4 G/DL (ref 1.3–3.2)
GLUCOSE SERPL-MCNC: 118 MG/DL (ref 70–220)
GLUCOSE SERPL-MCNC: 130 MG/DL (ref 70–220)
HCT VFR BLD CALC: 35.3 % (ref 37–47)
HGB BLD-MCNC: 11.7 G/DL (ref 12–16)
INR PPP: 0.88
LYMPHOCYTES # BLD AUTO: 0.5 10^3/UL (ref 0.8–2.9)
LYMPHOCYTES NFR BLD AUTO: 8.4 % (ref 15–51)
MAGNESIUM SERPL-MCNC: 1.8 MG/DL (ref 1.7–2.5)
MCH RBC QN AUTO: 30.9 PG (ref 29–33)
MCHC RBC AUTO-ENTMCNC: 33.1 G/DL (ref 32–37)
MCV RBC AUTO: 93.1 FL (ref 82–101)
MONOCYTES # BLD: 0.2 10^3/UL (ref 0.3–0.9)
MONOCYTES NFR BLD: 3.6 % (ref 0–11)
NEUTROPHILS # BLD: 5.3 10^3/UL (ref 1.6–7.5)
NEUTROPHILS NFR BLD AUTO: 87.5 % (ref 39–77)
NRBC # BLD MANUAL: 0 10^3/UL (ref 0–0)
NRBC BLD AUTO-RTO: 0 /100WBC (ref 0–0)
PLATELET # BLD: 225 10^3/UL (ref 140–415)
PMV BLD AUTO: 10.2 FL (ref 7.4–10.4)
POSITIVE DIFF: (no result)
POTASSIUM SERPL-SCNC: 3.8 MMOL/L (ref 3.5–5.1)
POTASSIUM SERPL-SCNC: 4.4 MMOL/L (ref 3.5–5.1)
PROT SERPL-MCNC: 7.6 G/DL (ref 6.1–8.1)
PROT SERPL-MCNC: 8.3 G/DL (ref 6.1–8.1)
PROTHROMBIN TIME: 11.9 SEC (ref 12.2–14.2)
PT RATIO: 0.9
RBC # BLD AUTO: 3.79 10^6/UL (ref 4.2–5.4)
SODIUM SERPL-SCNC: 136 MMOL/L (ref 135–144)
SODIUM SERPL-SCNC: 137 MMOL/L (ref 135–144)
TROPONIN I SERPL-MCNC: < 0.012 NG/ML (ref 0–0.12)
WBC # BLD AUTO: 6.1 10^3/UL (ref 4.8–10.8)

## 2017-10-20 RX ADMIN — PANTOPRAZOLE SODIUM SCH MG: 40 INJECTION, POWDER, FOR SOLUTION INTRAVENOUS at 04:33

## 2017-10-20 RX ADMIN — FOLIC ACID SCH MLS/HR: 5 INJECTION, SOLUTION INTRAMUSCULAR; INTRAVENOUS; SUBCUTANEOUS at 04:32

## 2017-10-20 RX ADMIN — PANTOPRAZOLE SODIUM SCH MG: 40 INJECTION, POWDER, FOR SOLUTION INTRAVENOUS at 17:00

## 2017-10-20 RX ADMIN — FOLIC ACID SCH MLS/HR: 5 INJECTION, SOLUTION INTRAMUSCULAR; INTRAVENOUS; SUBCUTANEOUS at 17:00

## 2017-10-20 NOTE — ERD
ER Documentation


Chief Complaint


Chief Complaint


abd pain with n/v since this afternoon hx of sbo





HPI


The patient is a 67-year-old female, presenting to the ER because of abdominal 

pain, nausea, vomiting that began about 12 PM today, she has similar symptoms 

previously due to partial small bowel obstruction, she vomited initially food 

and mucus, last bowel movement was 2 days ago, last meal was 10 days.  She 

denies fever, chills, neck pain, chest pain. dyspnea.  She does not smoke nor 

drink





Past medical history: History of partial small bowel obstruction, osteoporosis, 

L4 fracture





Past surgical history: Hysterectomy, 2 





ROS


All systems reviewed and are negative except as per history of present illness.





Medications


Home Meds


Active Scripts


Docusate Sodium* (Colace*) 100 Mg Capsule, 100 MG PO BID for 30 Days, CAP


   Prov:LITA TORRES.         16


Alendronate Sodium* (Fosamax*) 70 Mg Tablet, 70 MG PO Th@0730 for 30 Days, TAB


   Prov:LITA TORRES.         16


Reported Medications


Omeprazole* (Omeprazole*) 40 Mg Capsule.dr, 40 MG PO DAILY, #30 CAP


   10/20/17


Discontinued Scripts


Ibuprofen* (Motrin*) 800 Mg Tab, 7 MG PO Q6H Y for PAIN for 7 Days, #30 TAB


   Prov:TITI DAHL MD         17





Allergies


Allergies:  


Coded Allergies:  


     No Known Allergies (Unverified  Allergy, Mild, 10/20/17)





PMhx/Soc


History of Surgery:  Yes (total hysterectomy, bladder, appy, "multiple abdominal

")


Anesthesia Reaction:  No


Hx Neurological Disorder:  No


Hx Respiratory Disorders:  No


Hx Cardiac Disorders:  No


Hx Psychiatric Problems:  No


Hx Miscellaneous Medical Probl:  Yes


Hx Alcohol Use:  No


Hx Substance Use:  No


Hx Tobacco Use:  No





Physical Exam


Vitals





Vital Signs








  Date Time  Temp Pulse Resp B/P Pulse Ox O2 Delivery O2 Flow Rate FiO2


 


10/20/17 03:10  85 18 114/76 95 Room Air  


 


10/19/17 23:27 99.5 84 18 124/79 97   








Physical Exam


 Const:      No acute distress.


 Head:        Atraumatic.


 Eyes:       Normal Conjunctiva.


 ENT:         Normal External Ears, Nose and Mouth.


 Neck:        Full range of motion.  No meningismus.


 Resp:         Clear to auscultation bilaterally.


 Cardio:       Regular rate and rhythm.


 Abd:         Soft,  non distended, normal bowel sounds, Moderate and diffuse 

abdominal tenderness, more tender on the left-sided of the abdomen than the 

right sided of the abdomen, hypoactive bowel sounds


 Skin:         No petechiae or rashes.


 Back:        No midline or flank tenderness.


 Ext:          No cyanosis, or edema.


 Neur:        Awake and alert. No focal deficit


 Psych:        Normal Mood and Affect.


Result Diagram:  


10/20/17 0040                                                                  

              10/20/17 004





Results 24 hrs





 Laboratory Tests








Test


  10/20/17


00:40 10/20/17


02:18


 


White Blood Count 6.110^3/ul  


 


Red Blood Count 3.7910^6/ul  


 


Hemoglobin 11.7g/dl  


 


Hematocrit 35.3%  


 


Mean Corpuscular Volume 93.1fl  


 


Mean Corpuscular Hemoglobin 30.9pg  


 


Mean Corpuscular Hemoglobin


Concent 33.1g/dl 


  


 


 


Red Cell Distribution Width 13.9%  


 


Platelet Count 51164^3/UL  


 


Mean Platelet Volume 10.2fl  


 


Neutrophils % 87.5%  


 


Lymphocytes % 8.4%  


 


Monocytes % 3.6%  


 


Eosinophils % 0.0%  


 


Basophils % 0.2%  


 


Nucleated Red Blood Cells % 0.0/100WBC  


 


Neutrophils # 5.310^3/ul  


 


Lymphocytes # 0.510^3/ul  


 


Monocytes # 0.210^3/ul  


 


Eosinophils # 0.010^3/ul  


 


Basophils # 0.010^3/ul  


 


Nucleated Red Blood Cells # 0.010^3/ul  


 


Prothrombin Time 11.9Sec  


 


Prothrombin Time Ratio 0.9  


 


INR International Normalized


Ratio 0.88 


  


 


 


Activated Partial


Thromboplast Time 24.5Sec 


  


 


 


Sodium Level 136mmol/L  


 


Potassium Level 4.4mmol/L  


 


Chloride Level 99mmol/L  


 


Carbon Dioxide Level 28mmol/L  


 


Anion Gap 13  


 


Blood Urea Nitrogen 14mg/dl  


 


Creatinine 0.62mg/dl  


 


Glucose Level 130mg/dl  


 


Calcium Level 10.0mg/dl  


 


Total Bilirubin 0.8mg/dl  


 


Direct Bilirubin 0.00mg/dl  


 


Indirect Bilirubin 0.8mg/dl  


 


Aspartate Amino Transf


(AST/SGOT) 33IU/L 


  


 


 


Alanine Aminotransferase


(ALT/SGPT) 25IU/L 


  


 


 


Alkaline Phosphatase 54IU/L  


 


Troponin I < 0.012ng/ml  


 


Total Protein 8.3g/dl  


 


Albumin 4.3g/dl  


 


Globulin 4.00g/dl  


 


Albumin/Globulin Ratio 1.07  


 


Lipase 64U/L  


 


Bedside Urine pH (LAB)  7.5 


 


Bedside Urine Protein (LAB)  Negative 


 


Bedside Urine Glucose (UA)  Negative 


 


Bedside Urine Ketones (LAB)  2+ 


 


Bedside Urine Blood  Negative 


 


Bedside Urine Nitrite (LAB)  Negative 


 


Bedside Urine Leukocyte


Esterase (L 


  Negative 


 








 Current Medications








 Medications


  (Trade)  Dose


 Ordered  Sig/Stephanie


 Route


 PRN Reason  Start Time


 Stop Time Status Last Admin


Dose Admin


 


 Morphine Sulfate


  (morphine)  2 mg  ONCE  ONCE


 IV


   10/20/17 01:30


 10/20/17 01:31 DC 10/20/17 01:32


 


 


 Ondansetron HCl 4


 mg  4 mg  ONCE  STAT


 IV


   10/20/17 01:09


 10/20/17 01:11 DC 10/20/17 01:32


 


 


 Piperacillin Sod/


 Tazobactam Sod


  (Zosyn 3.375gm/


 100 ml (Pmx))  100 ml @ 


 200 mls/hr  ONCE  ONCE


 IVPB


   10/20/17 02:00


 10/20/17 02:29 DC 10/20/17 02:13


 











Procedures/MDM





 [place] [id number]


 Radiology Main Line: [phone]





 DIAGNOSTIC IMAGING REPORT





 Patient: AMINA RAND   : 1950   Age: 67  Sex: F                        


 MR #:    [id number]   Acct #:   [id number]    DOS: 10/20/17 0015


 Ordering MD: ROBERT MCLEOD MD   Location:  E/R   Room/Bed:                  

                          


 








PROCEDURE:   XR Chest. 


 


CLINICAL INDICATION:   Abdominal pain. 


 


TECHNIQUE:   Single frontal view. 


 


COMPARISON:   2016. 


 


FINDINGS:


There is a small calcified granuloma in the left upper lobe, unchanged. The 

lungs are otherwise clear. 


The heart size is normal.  There is calcification in the aorta consistent with 

atherosclerosis. 


There is no pleural effusion. 


There is no pneumothorax. 


 


IMPRESSION:


1.  Atherosclerosis.


2.  Small calcified granuloma in the left upper lobe, unchanged.


3.  Otherwise normal chest x-ray. 


 


RPTAT: QQ


_____________________________________________ 


.Forest Encinas MD, MD           Date    Time 


Electronically viewed and signed by .Forest Encinas MD, MD on 10/20/2017 00:31 


 


D:  10/20/2017 00:31  T:  10/20/2017 00:31


.R/





CC: ROBERT MCLEOD MD





 [place] [address]


 Radiology Main Line: [phone]





 DIAGNOSTIC IMAGING REPORT





 Patient: AMINA RAND   : 1950   Age: 67  Sex: F                        


 MR #:    [id number]   Acct #:   [id number]    DOS: 10/20/17 0015


 Ordering MD: ROBERT MCLEOD MD   Location:  E/R   Room/Bed:                  

                          


 








PROCEDURE:   CT abdomen and pelvis without intravenous contrast. 


 


CLINICAL INDICATION: Pain.


 


TECHNIQUE:   CT of the abdomen/pelvis was performed utilizing axial images with 

reconstructions in sagittal and coronal planes. The administered radiation dose 

is CTDI 6.7 mGy,  mGy-cm. One or more of the following dose reduction 

techniques were used: automated exposure control, adjustment of the mA and/or 

kV according to patient size and/or use of iterative reconstruction technique. 


 


COMPARISON: 2017


 


FINDINGS:


 


Visualized Chest: The visualized lung bases are clear.


 


Abdomen:


 


The liver, spleen, pancreas, gallbladder,and adrenal glands are unremarkable.   


 


The kidneys are without hydronephrosis.  No definite urinary calculi are seen.


 


Numerous markedly dilated loops of small bowel are seen within the left abdomen 

and lower abdomen. The more distal small bowel is collapsed. A transition point 

is seen within the midline pelvis in the vicinity of axial image 133 of series 

3. The bowel has a tethered appearance in the region. This is adjacent to some 

sutures and multiple surgical clips in the pelvis. The appendix is not seen, 

likely resected. There is no intra-abdominal free air or pneumatosis. There is 

a small, omentum containing ventral abdominal hernia.


 


There is no evidence of intra-abdominal adenopathy or free fluid.  


 


 


Pelvis:


 


Prior hysterectomy is noted.  The urinary bladder is unremarkable.  There is no 

pelvic adenopathy of free fluid.


 


Osseous structures: Unremarkable.


 


IMPRESSION:


 


Small bowel obstruction secondary to adhesions and / or an internal hernia 

within the lower abdomen and pelvis.


 


RPTAT: HIKT


_____________________________________________ 


.Walter  Isabel, MD, MD           Date    Time 


Electronically viewed and signed by .Walter Hall MD, MD on 10/20/2017 01:53 


 


D:  10/20/2017 01:53  T:  10/20/2017 01:53


.T/





CC: ROBERT MCLEOD MD





MEDICAL MAKING DECISION: The patient is a 67-year-old female, presenting with 

acute small bowel obstruction.  She was treated with Zosyn IV, nasogastric tube

, morphine 2 mg IV for pain and Zofran 4 mg IV for nausea with good response





The differential diagnoses considered include but are not limited to 

cholelithiasis, cholecystitis, cystitis, pancreatitis, hepatitis, gastritis, 

peptic ulcer disease, gastric ulcer, appendicitis, diverticulitis, cholangitis, 

choledocholithiasis.





Consultation: I discussed the patient with the on-call general surgeon Dr. Russo at 3:10 AM, who was made aware of the lab, the treatment, the patient 

condition.  He accepted the consult





Departure


Diagnosis:  


 Primary Impression:  


 Small bowel obstruction


 Additional Impression:  


 Anemia


Condition:  Stable


Comments


I discussed the findings with the patient. I discussed the patient with the on-

call hospitalist Dr. Gray at 2:25 AM who was made aware of the lab, the 

treatment, the patient condition. The patient is admitted to ROBERT GUTIERREZ MD Oct 20, 2017 00:12

## 2017-10-20 NOTE — HP
Date/Time of Note


Date/Time of Note


DATE: 10/20/17 


TIME: 03:28





Assessment/Plan


VTE Prophylaxis


VTE Prophylaxis Intervention:  SCD's





Assessment/Plan


Chief Complaint/Hosp Course


This is a 66-year-old female being admitted to [facility] for:





#1 small bowel obstruction: Likely secondary to adhesions from multiple 

abdominal surgeries/ radiation.  CT scan shows: Small bowel obstruction 

secondary to adhesions and / or an internal hernia within the lower abdomen and 

pelvis.  The current time we will keep the patient n.p.o. Normal saline at 100 

cc an hour.  Zofran 4 mg IV every 6 hours as needed nausea.  NG tube to low 

intermittent suction.  Dr. Russo notified by the ED physician.  IV morphine 

for pain control





#2 osteoporosis: Stable we will hold current home medications at this time and 

restart once SBO resolves.





#3 DVT and GI prophylaxis: SCDs, PPI





Further treatment strategy will be planned as per the clinical course


Problems:  





HPI/ROS


Admit Date/Time


Admit Date/Time








Hx of Present Illness


Chief complaint: Abdominal pain, nausea and vomiting





The patient is a 67-year-old female with a history of multiple abdominal 

surgeries and multiple small bowel obstructions, presenting to the ER because 

of abdominal pain, nausea, vomiting that began about 12 PM yesterday.  Her 

previous symptoms were very similar to when she had a small bowel obstruction.  

She vomited initially food and mucus.  Her last bowel movement was 2 days ago. 

  She denies fever, chills, neck pain, chest pain. dyspnea.  





Allergies: NKDA





Medications: See MAR





ROS


Const: As per HPI


Eyes : No pain discharge or redness or change in visual acuity


ENT: No pain, sore throat, congestion, congestion,  dysphagia or discharge


Respiratory: No shortness of breath, cough, sputum, wheezing, or pleuritic pain


Cardiovascular: No chest pain, palpitation, PND, or edema


GI : As per HPI


Genitourinary: No dysuria, hematuria, flank pain ,  discharge or CVA tenderness


Musculoskeletal: No joint pain, back pain, neck pain, restricted range of 

motion in neck or joints


Skin: No rash, bruising or hives 


Neuro: No headache, dizziness, syncope, seizure, focal weakness


Endocrine: No polyuria, polydipsia, temperature intolerance


Psych: No hallucination, depression, anxiety or suicidal ideation





PMH/Family/Social


Past Medical History


Osteoporosis, cervical cancer, history of SBO














Past Surgical History


Multiple abdominal surgeries 7, including total abdominal hysterectomy, 

appendectomy, bladder surgery, status post radiation for cervical cancer





Family History


Significant Family History:  heart disease





Social History


Alcohol Use:  none


Smoking Status:  Never smoker


Drug Use:  none





Exam/Review of Systems


Vital Signs


Vitals





 Vital Signs








  Date Time  Temp Pulse Resp B/P Pulse Ox O2 Delivery O2 Flow Rate FiO2


 


10/20/17 03:10  85 18 114/76 95 Room Air  


 


10/19/17 23:27 99.5       











Exam


Exam





General: Patient is sitting in bed in mild distress from pain


HEENT: Atraumatic, normocephalic. The pupils are equal, round and reactive. 

Extraocular motor are intact, NG tube in place


Neck: Supple with full range of motion. No rigidity or meningismus


Chest: Nontender


Lungs: Clear to auscultation bilaterally no crackles rales or wheezing


Heart: Normal S1-S2, Regular rhythm and rate. No murmur, S3, or S4


Abdomen: Soft, generalized tenderness to palpation, hypoactive bowel sounds


Extremities: Normal to inspection, no edema no cyanosis


Neurologic: Normal mental status, speech normal, cranial nerves II through XII 

are intact, motor and sensory are intact, no focal weakness


Additional Comments


PROCEDURE:   CT abdomen and pelvis without intravenous contrast. 


 


CLINICAL INDICATION: Pain.


 


TECHNIQUE:   CT of the abdomen/pelvis was performed utilizing axial images with 

reconstructions in sagittal and coronal planes. The administered radiation dose 

is CTDI 6.7 mGy,  mGy-cm. One or more of the following dose reduction 

techniques were used: automated exposure control, adjustment of the mA and/or 

kV according to patient size and/or use of iterative reconstruction technique. 


 


COMPARISON: 05/09/2017


 


FINDINGS:


 


Visualized Chest: The visualized lung bases are clear.


 


Abdomen:


 


The liver, spleen, pancreas, gallbladder,and adrenal glands are unremarkable.   


 


The kidneys are without hydronephrosis.  No definite urinary calculi are seen.


 


Numerous markedly dilated loops of small bowel are seen within the left abdomen 

and lower abdomen. The more distal small bowel is collapsed. A transition point 

is seen within the midline pelvis in the vicinity of axial image 133 of series 

3. The bowel has a tethered appearance in the region. This is adjacent to some 

sutures and multiple surgical clips in the pelvis. The appendix is not seen, 

likely resected. There is no intra-abdominal free air or pneumatosis. There is 

a small, omentum containing ventral abdominal hernia.


 


There is no evidence of intra-abdominal adenopathy or free fluid.  


 


 


Pelvis:


 


Prior hysterectomy is noted.  The urinary bladder is unremarkable.  There is no 

pelvic adenopathy of free fluid.


 


Osseous structures: Unremarkable.


 


IMPRESSION:


 


Small bowel obstruction secondary to adhesions and / or an internal hernia 

within the lower abdomen and pelvis.


 


RPTAT: HIKT


_____________________________________________ 


.Walter Hall MD, MD           Date    Time 


Electronically viewed and signed by .Walter Hall MD, MD on 10/20/2017 01:53 


 


D:  10/20/2017 01:53  T:  10/20/2017 01:53


.T/





CC: ROBERT MCLEOD MD


PROCEDURE:   XR Chest. 


 


CLINICAL INDICATION:   Abdominal pain. 


 


TECHNIQUE:   Single frontal view. 


 


COMPARISON:   12/11/2016. 


 


FINDINGS:


There is a small calcified granuloma in the left upper lobe, unchanged. The 

lungs are otherwise clear. 


The heart size is normal.  There is calcification in the aorta consistent with 

atherosclerosis. 


There is no pleural effusion. 


There is no pneumothorax. 


 


IMPRESSION:


1.  Atherosclerosis.


2.  Small calcified granuloma in the left upper lobe, unchanged.


3.  Otherwise normal chest x-ray. 


 


RPTAT: QQ


_____________________________________________ 


.Forest Encinas MD, MD           Date    Time 


Electronically viewed and signed by .Forest Encinas MD, MD on 10/20/2017 00:31 


 


D:  10/20/2017 00:31  T:  10/20/2017 00:31


.R/





CC: ROBERT MCLEOD MD





Labs


Result Diagram:  


10/20/17 0040                                                                  

              10/20/17 0040














HUNG HAIDER Oct 20, 2017 03:33

## 2017-10-20 NOTE — RADRPT
PROCEDURE:   CT abdomen and pelvis without intravenous contrast. 

 

CLINICAL INDICATION: Pain.

 

TECHNIQUE:   CT of the abdomen/pelvis was performed utilizing axial images with reconstructions in s
agittal and coronal planes. The administered radiation dose is CTDI 6.7 mGy,  mGy-cm. One or 
more of the following dose reduction techniques were used: automated exposure control, adjustment of
 the mA and/or kV according to patient size and/or use of iterative reconstruction technique. 

 

COMPARISON: 05/09/2017

 

FINDINGS:

 

Visualized Chest: The visualized lung bases are clear.

 

Abdomen:

 

The liver, spleen, pancreas, gallbladder,and adrenal glands are unremarkable.   

 

The kidneys are without hydronephrosis.  No definite urinary calculi are seen.

 

Numerous markedly dilated loops of small bowel are seen within the left abdomen and lower abdomen. T
he more distal small bowel is collapsed. A transition point is seen within the midline pelvis in the
 vicinity of axial image 133 of series 3. The bowel has a tethered appearance in the region. This is
 adjacent to some sutures and multiple surgical clips in the pelvis. The appendix is not seen, likel
y resected. There is no intra-abdominal free air or pneumatosis. There is a small, omentum containin
g ventral abdominal hernia.

 

There is no evidence of intra-abdominal adenopathy or free fluid.  

 

 

Pelvis:

 

Prior hysterectomy is noted.  The urinary bladder is unremarkable.  There is no pelvic adenopathy of
 free fluid.

 

Osseous structures: Unremarkable.

 

IMPRESSION:

 

Small bowel obstruction secondary to adhesions and / or an internal hernia within the lower abdomen 
and pelvis.

 

RPTAT: HIKT

_____________________________________________ 

.Walter Hall MD, MD           Date    Time 

Electronically viewed and signed by .Walter Hall MD, MD on 10/20/2017 01:53 

 

D:  10/20/2017 01:53  T:  10/20/2017 01:53

.T/

## 2017-10-20 NOTE — CONS
Date/Time of Note


Date/Time of Note


DATE: 10/20/17 


TIME: 18:42





Assessment/Plan


Assessment/Plan


Chief Complaint/Hosp Course


1. Small bowel obstruction: 2/2 to adhesions +/- internal hernia within the 

lower abdomen and pelvis


-ngt to low intermittent suction unless ambulating


-ambulate


-npo


-ivf


2. Recurrent reducible ventral hernia


-monitor for obstruction/incarceration


-weight loss


-eventual hernia repair that can be done outpatient


3. Atherosclerosis


-medical management


4. Osteoporosis


-medical management


5. Overweight: BMI 26


-weight loss encouraged


-diet and nutrition optimization


6. Normocytic normochromic anemia


-monitor and transfuse prn


7. Hypocalcemia with hypoalbuminemia: nutritional vs. other


-optimize nutrition as able





Thank you. Patient seen and examined in collaboration with Dr. Avel Russo.


Problems:  





Consultation Date/Type/Reason


Admit Date/Time





Date of Consultation:  Oct 20, 2017


Type of Consultation:  Surgical


Reason for Consultation


sbo


Referring Provider:  ROBERT MCLEOD MD





Hx of Present Illness


Makayla Wilks is a 66 yo woman who presents to [facility] with 

complaints of abdominal pain, predominantly in the lower abdomen. The pain is 

constant and is similar to pain that she experienced multiple times before when 

she diagnosed with bowel obstruction.   Associated symptoms include nause and 

vomiting non-bloody emesis. She denies fevers, chills, sick contacts, myalgias, 

diarrhea, headaches dizziness, chest pain or palpitations.According to the 

patient she has had multiple episodes of bowel obstruction, all of which 

resolved without intervention.  Significantly, she has had multiple abdominal 

surgeries, as well as chemotherapy. General surgery was asked to consult.


Constitutional:  No chills, No diaphoresis, No febrile


Eyes:  No visual change


ENT:  No discharge, No pain


Respiratory:  No cough, No shortness of breath


Cardiovascular:  No chest pain, No lightheadedness


Gastrointestinal:  other (as above), 


   No diarrhea


Genitourinary:  No dysuria, No hematuria


Musculoskeletal:  No back pain, No restricted range of motion


Skin:  No bruising, No erythema


Neurologic:  No confusion, No dizziness


Endocrine:  No polyuria


Lymphatic:  No tender nodes


Psychological:  nl mood/affect, 


   No anxiety, No confusion


Immunologic:  No pruritis, No urticaria





Past Medical History


multiple small bowel obstruction


osteoporosis


L4 fracture


cervical cancer s/p chemo


recurrent reducible ventral hernia





Past Surgical History


Hysterectomy





Multiple abdominal surgeries  (total abdominal hysterectomy, appendectomy, 

bladder surgery, adhesion lysis)





Family History


Significant Family History:  no pertinent family hx





Social History


Alcohol Use:  none


Smoking Status:  Never smoker


Drug Use:  none





Exam/Review of Systems


Vital Signs


Vitals





 Vital Signs








  Date Time  Temp Pulse Resp B/P Pulse Ox O2 Delivery O2 Flow Rate FiO2


 


10/20/17 13:22 99.0 74 20 107/73 96   


 


10/20/17 03:10      Room Air  











Exam


Constitutional:  alert, oriented, well developed


Psych:  nl mood/affect


Head:  atraumatic, normocephalic


Eyes:  nl lids, nl sclera


ENMT:  mucosa pink and moist, nl nasal mucosa & septum, other (ng tube bilious)


Neck:  non-tender, supple


Respiratory:  normal air movement, 


   No labored breathing


Cardiovascular:  nl pulses, regular rate and rhythm


Gastrointestinal:  bowel sounds, distended (min), soft, tender (min lower 

abdomen), 


   No mass


Genitourinary - Female:  nl adnexae, nl external genitalia


Musculoskeletal:  nl extremities to inspection, nl gait and stance


Extremities:  normal pulses


Neurological:  nl mental status, nl speech, nl strength


Skin:  rash or lesions


Lymph:  nl lymph nodes





Results


Result Diagram:  


10/20/17 0040                                                                  

              10/20/17 0523





Results 24 hrs





Laboratory Tests








Test


  10/20/17


00:40 10/20/17


02:18 10/20/17


05:23


 


White Blood Count 6.1  #  


 


Red Blood Count 3.79  L  


 


Hemoglobin 11.7  L  


 


Hematocrit 35.3  L  


 


Mean Corpuscular Volume 93.1    


 


Mean Corpuscular Hemoglobin 30.9    


 


Mean Corpuscular Hemoglobin


Concent 33.1  


  


  


 


 


Red Cell Distribution Width 13.9    


 


Platelet Count 225    


 


Mean Platelet Volume 10.2    


 


Neutrophils % 87.5  H  


 


Lymphocytes % 8.4  L  


 


Monocytes % 3.6    


 


Eosinophils % 0.0    


 


Basophils % 0.2    


 


Nucleated Red Blood Cells % 0.0    


 


Neutrophils # 5.3    


 


Lymphocytes # 0.5  L  


 


Monocytes # 0.2  L  


 


Eosinophils # 0.0    


 


Basophils # 0.0    


 


Nucleated Red Blood Cells # 0.0    


 


Prothrombin Time 11.9  L  


 


Prothrombin Time Ratio 0.9    


 


INR International Normalized


Ratio 0.88  


  


  


 


 


Activated Partial


Thromboplast Time 24.5  L


  


  


 


 


Sodium Level 136    137  


 


Potassium Level 4.4    3.8  


 


Chloride Level 99    101  


 


Carbon Dioxide Level 28    28  


 


Anion Gap 13    12  


 


Blood Urea Nitrogen 14    14  


 


Creatinine 0.62    0.67  


 


Glucose Level 130    118  


 


Calcium Level 10.0    9.6  


 


Total Bilirubin 0.8    1.0  


 


Direct Bilirubin 0.00    0.00  


 


Indirect Bilirubin 0.8    1.0  


 


Aspartate Amino Transf


(AST/SGOT) 33  


  


  25  


 


 


Alanine Aminotransferase


(ALT/SGPT) 25  


  


  25  


 


 


Alkaline Phosphatase 54    55  


 


Troponin I < 0.012    


 


Total Protein 8.3  H  7.6  


 


Albumin 4.3    3.9  


 


Globulin 4.00  H  3.70  H


 


Albumin/Globulin Ratio 1.07    1.05  


 


Lipase 64    


 


Bedside Urine pH (LAB)  7.5   


 


Bedside Urine Protein (LAB)  Negative   


 


Bedside Urine Glucose (UA)  Negative   


 


Bedside Urine Ketones (LAB)  2+  H 


 


Bedside Urine Blood  Negative   


 


Bedside Urine Nitrite (LAB)  Negative   


 


Bedside Urine Leukocyte


Esterase (L 


  Negative  


  


 


 


Magnesium Level   1.8  











Medications


Medications





 Current Medications


Sodium Chloride (NS) 1,000 ml @  75 mls/hr [id number] IV  Last administered on 10/

20/17at 17:00; Admin Dose 75 MLS/HR;  Start 10/20/17 at 03:33


Ondansetron HCl (Zofran Inj) 4 mg Q6H  PRN IV NAUSEA AND/OR VOMITING Last 

administered on 10/20/17at 09:06; Admin Dose 4 MG;  Start 10/20/17 at 04:00


Morphine Sulfate (morphine) 2 mg Q4H  PRN IV SEVERE PAIN LEVEL 7-10;  Start 10/

20/17 at 04:00


Pantoprazole (Protonix Iv) 40 mg BID@06,18 IV  Last administered on 10/20/17at 

17:00; Admin Dose 40 MG;  Start 10/20/17 at 04:00


Influenza Virus Vaccine (Fluzone) 0.5 ml ONCE ONCE IM* ;  Start 10/21/17 at 09:

00;  Stop 10/21/17 at 09:01











TERSEE HEIN NP Oct 20, 2017 18:42

## 2017-10-20 NOTE — RADRPT
PROCEDURE:   XR Chest. 

 

CLINICAL INDICATION:   Abdominal pain. 

 

TECHNIQUE:   Single frontal view. 

 

COMPARISON:   12/11/2016. 

 

FINDINGS:

There is a small calcified granuloma in the left upper lobe, unchanged. The lungs are otherwise yovani
r. 

The heart size is normal.  There is calcification in the aorta consistent with atherosclerosis. 

There is no pleural effusion. 

There is no pneumothorax. 

 

IMPRESSION:

1.  Atherosclerosis.

2.  Small calcified granuloma in the left upper lobe, unchanged.

3.  Otherwise normal chest x-ray. 

 

RPTAT: QQ

_____________________________________________ 

.Forest Encinas MD, MD           Date    Time 

Electronically viewed and signed by .Forest Encinas MD, MD on 10/20/2017 00:31 

 

D:  10/20/2017 00:31  T:  10/20/2017 00:31

.R/

## 2017-10-21 VITALS — DIASTOLIC BLOOD PRESSURE: 69 MMHG | SYSTOLIC BLOOD PRESSURE: 115 MMHG | RESPIRATION RATE: 16 BRPM

## 2017-10-21 VITALS — RESPIRATION RATE: 18 BRPM | DIASTOLIC BLOOD PRESSURE: 58 MMHG | SYSTOLIC BLOOD PRESSURE: 108 MMHG | HEART RATE: 68 BPM

## 2017-10-21 VITALS — RESPIRATION RATE: 16 BRPM | DIASTOLIC BLOOD PRESSURE: 59 MMHG | SYSTOLIC BLOOD PRESSURE: 99 MMHG

## 2017-10-21 VITALS — SYSTOLIC BLOOD PRESSURE: 94 MMHG | DIASTOLIC BLOOD PRESSURE: 51 MMHG | RESPIRATION RATE: 20 BRPM

## 2017-10-21 LAB
ALBUMIN SERPL-MCNC: 3.1 G/DL (ref 3.3–4.9)
ALBUMIN/GLOB SERPL: 0.91 {RATIO}
ALP SERPL-CCNC: 48 IU/L (ref 42–121)
ALT SERPL-CCNC: 26 IU/L (ref 13–69)
ANION GAP SERPL CALC-SCNC: 10 MMOL/L (ref 8–16)
AST SERPL-CCNC: 51 IU/L (ref 15–46)
BILIRUB DIRECT SERPL-MCNC: 0 MG/DL (ref 0–0.2)
BILIRUB SERPL-MCNC: 1 MG/DL (ref 0.2–1.3)
BUN SERPL-MCNC: 16 MG/DL (ref 7–20)
CALCIUM SERPL-MCNC: 8.3 MG/DL (ref 8.4–10.2)
CHLORIDE SERPL-SCNC: 108 MMOL/L (ref 97–110)
CO2 SERPL-SCNC: 26 MMOL/L (ref 21–31)
CREAT SERPL-MCNC: 0.68 MG/DL (ref 0.44–1)
GLOBULIN SER-MCNC: 3.4 G/DL (ref 1.3–3.2)
GLUCOSE SERPL-MCNC: 78 MG/DL (ref 70–220)
MAGNESIUM SERPL-MCNC: 2 MG/DL (ref 1.7–2.5)
POTASSIUM SERPL-SCNC: 3.7 MMOL/L (ref 3.5–5.1)
PROT SERPL-MCNC: 6.5 G/DL (ref 6.1–8.1)
SODIUM SERPL-SCNC: 140 MMOL/L (ref 135–144)

## 2017-10-21 RX ADMIN — PANTOPRAZOLE SODIUM SCH MG: 40 INJECTION, POWDER, FOR SOLUTION INTRAVENOUS at 05:35

## 2017-10-21 RX ADMIN — FOLIC ACID SCH MLS/HR: 5 INJECTION, SOLUTION INTRAMUSCULAR; INTRAVENOUS; SUBCUTANEOUS at 19:33

## 2017-10-21 RX ADMIN — PANTOPRAZOLE SODIUM SCH MG: 40 INJECTION, POWDER, FOR SOLUTION INTRAVENOUS at 18:03

## 2017-10-21 RX ADMIN — FOLIC ACID SCH MLS/HR: 5 INJECTION, SOLUTION INTRAMUSCULAR; INTRAVENOUS; SUBCUTANEOUS at 23:38

## 2017-10-21 RX ADMIN — FOLIC ACID SCH MLS/HR: 5 INJECTION, SOLUTION INTRAMUSCULAR; INTRAVENOUS; SUBCUTANEOUS at 05:44

## 2017-10-21 NOTE — PN
Date/Time of Note


Date/Time of Note


DATE: 10/21/17 


TIME: 16:41





Assessment/Plan


VTE Prophylaxis


VTE Prophylaxis Intervention:  LMWH





Lines/Catheters


IV Catheter Type (from Nrs):  Peripheral IV





Assessment/Plan


Chief Complaint/Hosp Course


66 yo female wtih recurrent SBO 2/2 adhesions presenting again with SBO





SBO:


- Continue medical management for now with NG tube


- Surgery is a consideration but not during this admission


- Patient passing flatus, dc NG tomorrow if stable and trial of clears


- IVF maintenance for now





Dispo when tolerating PO and having BMs


Problems:  





Subjective


24 Hr Interval Summary


Free Text/Dictation


Symptoms improved


Feels well


Spoke with Dr Russo, high risk for surgery





Exam/Review of Systems


Vital Signs


Vitals





 Vital Signs








  Date Time  Temp Pulse Resp B/P Pulse Ox O2 Delivery O2 Flow Rate FiO2


 


10/21/17 15:38 98.1 75 16 115/69 97   


 


10/21/17 02:00      Room Air  














 Intake and Output   


 


 10/20/17 10/20/17 10/21/17





 15:00 23:00 07:00


 


Intake Total  100 ml 1000 ml


 


Output Total 300 ml  150 ml


 


Balance -300 ml 100 ml 850 ml











Exam


Constitutional:  alert, oriented, well developed


Psych:  nl mood/affect, no complaints


Head:  atraumatic, normocephalic


Eyes:  EOMI, PERRL, nl conjunctiva, nl lids, nl sclera


ENMT:  nl external ears & nose, nl lips & teeth, nl nasal mucosa & septum


Neck:  non-tender, supple


Respiratory:  clear to auscultation, normal air movement


Cardiovascular:  nl pulses, regular rate and rhythm


Gastrointestinal:  nl liver, spleen, non-tender, soft


Musculoskeletal:  nl extremities to inspection, nl gait and stance


Extremities:  normal pulses


Neurological:  CNS II-XII intact, nl mental status, nl speech, nl strength


Skin:  nl turgor, 


   No rash or lesions


Lymph:  nl lymph nodes





Results


Result Diagram:  


10/20/17 0040                                                                  

              10/21/17 0514





Results 24 hrs





Laboratory Tests








Test


  10/21/17


05:14


 


Sodium Level 140  


 


Potassium Level 3.7  


 


Chloride Level 108  


 


Carbon Dioxide Level 26  


 


Anion Gap 10  


 


Blood Urea Nitrogen 16  


 


Creatinine 0.68  


 


Glucose Level 78  #


 


Calcium Level 8.3  L


 


Magnesium Level 2.0  


 


Total Bilirubin 1.0  


 


Direct Bilirubin 0.00  


 


Indirect Bilirubin 1.0  


 


Aspartate Amino Transf


(AST/SGOT) 51  H


 


 


Alanine Aminotransferase


(ALT/SGPT) 26  


 


 


Alkaline Phosphatase 48  


 


Total Protein 6.5  #


 


Albumin 3.1  L


 


Globulin 3.40  H


 


Albumin/Globulin Ratio 0.91  











Medications


Medications





 Current Medications


Sodium Chloride (NS) 1,000 ml @  75 mls/hr [id number] IV  Last administered on 10/

21/17at 05:44; Admin Dose 75 MLS/HR;  Start 10/20/17 at 03:33


Ondansetron HCl (Zofran Inj) 4 mg Q6H  PRN IV NAUSEA AND/OR VOMITING Last 

administered on 10/20/17at 09:06; Admin Dose 4 MG;  Start 10/20/17 at 04:00


Morphine Sulfate (morphine) 2 mg Q4H  PRN IV SEVERE PAIN LEVEL 7-10;  Start 10/

20/17 at 04:00


Pantoprazole (Protonix Iv) 40 mg BID@06,18 IV  Last administered on 10/21/17at 

05:35; Admin Dose 40 MG;  Start 10/20/17 at 04:00











MERCEDES BRANDT MD Oct 21, 2017 16:42

## 2017-10-21 NOTE — PN
Date/Time of Note


Date/Time of Note


DATE: 10/21/17 


TIME: 17:02





Assessment/Plan


Lines/Catheters


IV Catheter Type (from Nrs):  Peripheral IV





Assessment/Plan


Chief Complaint/Hosp Course


1. Small bowel obstruction: 2/2 to adhesions +/- internal hernia within the 

lower abdomen and pelvis; +flatus


-ngt clamp


-ambulate


-npo- if asymptomatic may start clears 


-ivf


2. Recurrent reducible ventral hernia


-monitor for obstruction/incarceration


-weight loss


-eventual hernia repair that can be done outpatient


3. Atherosclerosis


-medical management


4. Osteoporosis


-medical management


5. Overweight: BMI 26


-weight loss encouraged


-diet and nutrition optimization


6. Normocytic normochromic anemia


-monitor and transfuse prn


7. Hypocalcemia with hypoalbuminemia: nutritional vs. other


-optimize nutrition as able





Thank you. Patient seen and examined in collaboration with Dr. Avel Russo.


Problems:  





Subjective


24 Hr Interval Summary


Continues to have lower abdominal pain but improved. No nausea/vomiting. Large 

amount output from NGT. +flatus. No fevers, chills, sob, congested cough, n/v/d/

dysuria.





Exam/Review of Systems


Vital Signs


Vitals





 Vital Signs








  Date Time  Temp Pulse Resp B/P Pulse Ox O2 Delivery O2 Flow Rate FiO2


 


10/21/17 15:38 98.1 75 16 115/69 97   


 


10/21/17 02:00      Room Air  














 Intake and Output   


 


 10/20/17 10/20/17 10/21/17





 15:00 23:00 07:00


 


Intake Total  100 ml 1000 ml


 


Output Total 300 ml  150 ml


 


Balance -300 ml 100 ml 850 ml











Exam


Free Text/Dictation


Constitutional:  alert, oriented, well developed


Psych:  nl mood/affect


Head:  atraumatic, normocephalic


Eyes:  nl lids, nl sclera


ENMT:  mucosa pink and moist, nl nasal mucosa & septum, other (ng tube bilious)


Neck:  non-tender, supple


Respiratory:  normal air movement, 


   No labored breathing


Cardiovascular:  nl pulses, regular rate and rhythm


Gastrointestinal:  bowel sounds, distended (min), soft, tender (min lower 

abdomen-improved), 


   No mass


Genitourinary - Female:  nl adnexae, nl external genitalia


Musculoskeletal:  nl extremities to inspection, nl gait and stance


Extremities:  normal pulses


Neurological:  nl mental status, nl speech, nl strength


Skin:  rash or lesions


Lymph:  nl lymph nodes





Results


Result Diagram:  


10/20/17 0040                                                                  

              10/21/17 0514














TERESE HEIN NP Oct 21, 2017 17:06

## 2017-10-22 VITALS — RESPIRATION RATE: 20 BRPM | DIASTOLIC BLOOD PRESSURE: 59 MMHG | SYSTOLIC BLOOD PRESSURE: 104 MMHG

## 2017-10-22 VITALS — RESPIRATION RATE: 18 BRPM | DIASTOLIC BLOOD PRESSURE: 61 MMHG | SYSTOLIC BLOOD PRESSURE: 110 MMHG

## 2017-10-22 VITALS — SYSTOLIC BLOOD PRESSURE: 94 MMHG | RESPIRATION RATE: 20 BRPM | DIASTOLIC BLOOD PRESSURE: 55 MMHG

## 2017-10-22 VITALS — DIASTOLIC BLOOD PRESSURE: 63 MMHG | RESPIRATION RATE: 16 BRPM | SYSTOLIC BLOOD PRESSURE: 104 MMHG

## 2017-10-22 LAB
ALBUMIN SERPL-MCNC: 3.5 G/DL (ref 3.3–4.9)
ALBUMIN/GLOB SERPL: 1 {RATIO}
ALP SERPL-CCNC: 58 IU/L (ref 42–121)
ALT SERPL-CCNC: 25 IU/L (ref 13–69)
ANION GAP SERPL CALC-SCNC: 15 MMOL/L (ref 8–16)
AST SERPL-CCNC: 25 IU/L (ref 15–46)
BASOPHILS # BLD AUTO: 0 10^3/UL (ref 0–0.1)
BASOPHILS NFR BLD: 0.4 % (ref 0–2)
BILIRUB DIRECT SERPL-MCNC: 0 MG/DL (ref 0–0.2)
BILIRUB SERPL-MCNC: 1 MG/DL (ref 0.2–1.3)
BUN SERPL-MCNC: 16 MG/DL (ref 7–20)
CALCIUM SERPL-MCNC: 8.1 MG/DL (ref 8.4–10.2)
CHLORIDE SERPL-SCNC: 111 MMOL/L (ref 97–110)
CO2 SERPL-SCNC: 19 MMOL/L (ref 21–31)
CREAT SERPL-MCNC: 0.64 MG/DL (ref 0.44–1)
EOSINOPHIL # BLD: 0 10^3/UL (ref 0–0.5)
EOSINOPHIL NFR BLD: 0.6 % (ref 0–7)
ERYTHROCYTE [DISTWIDTH] IN BLOOD BY AUTOMATED COUNT: 13.5 % (ref 11.5–14.5)
GLOBULIN SER-MCNC: 3.5 G/DL (ref 1.3–3.2)
GLUCOSE SERPL-MCNC: 60 MG/DL (ref 70–220)
HCT VFR BLD CALC: 33 % (ref 37–47)
HGB BLD-MCNC: 10.5 G/DL (ref 12–16)
LYMPHOCYTES # BLD AUTO: 0.7 10^3/UL (ref 0.8–2.9)
LYMPHOCYTES NFR BLD AUTO: 13.6 % (ref 15–51)
MAGNESIUM SERPL-MCNC: 1.9 MG/DL (ref 1.7–2.5)
MCH RBC QN AUTO: 31.3 PG (ref 29–33)
MCHC RBC AUTO-ENTMCNC: 31.8 G/DL (ref 32–37)
MCV RBC AUTO: 98.5 FL (ref 82–101)
MONOCYTES # BLD: 0.3 10^3/UL (ref 0.3–0.9)
MONOCYTES NFR BLD: 5.4 % (ref 0–11)
NEUTROPHILS # BLD: 4.2 10^3/UL (ref 1.6–7.5)
NEUTROPHILS NFR BLD AUTO: 79.8 % (ref 39–77)
NRBC # BLD MANUAL: 0 10^3/UL (ref 0–0)
NRBC BLD AUTO-RTO: 0 /100WBC (ref 0–0)
PLATELET # BLD: 219 10^3/UL (ref 140–415)
PMV BLD AUTO: 10 FL (ref 7.4–10.4)
POTASSIUM SERPL-SCNC: 4.5 MMOL/L (ref 3.5–5.1)
PROT SERPL-MCNC: 7 G/DL (ref 6.1–8.1)
RBC # BLD AUTO: 3.35 10^6/UL (ref 4.2–5.4)
SODIUM SERPL-SCNC: 140 MMOL/L (ref 135–144)
WBC # BLD AUTO: 5.2 10^3/UL (ref 4.8–10.8)

## 2017-10-22 RX ADMIN — PANTOPRAZOLE SODIUM SCH MG: 40 INJECTION, POWDER, FOR SOLUTION INTRAVENOUS at 17:40

## 2017-10-22 RX ADMIN — IBUPROFEN PRN MG: 600 TABLET ORAL at 12:12

## 2017-10-22 RX ADMIN — PANTOPRAZOLE SODIUM SCH MG: 40 INJECTION, POWDER, FOR SOLUTION INTRAVENOUS at 05:54

## 2017-10-22 NOTE — PN
Date/Time of Note


Date/Time of Note


DATE: 10/22/17 


TIME: 15:25





Assessment/Plan


VTE Prophylaxis


VTE Prophylaxis Intervention:  heparin





Lines/Catheters


IV Catheter Type (from Nrs):  Peripheral IV





Assessment/Plan


Chief Complaint/Hosp Course


68 yo female wtih recurrent SBO 2/2 adhesions presenting again with SBO





SBO:


- Continue medical management


- NG removed, passing flatus, no BM yet


- Trial of clears


- Surgery is a consideration but not during this admission, very high risk per 

Dr Karan Vazquez when tolerating PO and having BMs


Problems:  





Subjective


24 Hr Interval Summary


Free Text/Dictation


NG tube removed


Passing flatus


So far can tolerate clears





Exam/Review of Systems


Vital Signs


Vitals





 Vital Signs








  Date Time  Temp Pulse Resp B/P Pulse Ox O2 Delivery O2 Flow Rate FiO2


 


10/22/17 14:48 98.6 78 20 94/55 97   


 


10/21/17 02:00      Room Air  














 Intake and Output   


 


 10/21/17 10/21/17 10/22/17





 15:00 23:00 07:00


 


Intake Total  750 ml 718 ml


 


Output Total   0 ml


 


Balance  750 ml 718 ml











Exam


Constitutional:  alert, oriented, well developed


Psych:  nl mood/affect, no complaints


Head:  atraumatic, normocephalic


Eyes:  EOMI, PERRL, nl conjunctiva, nl lids, nl sclera


ENMT:  nl external ears & nose, nl lips & teeth, nl nasal mucosa & septum


Neck:  non-tender, supple


Respiratory:  clear to auscultation, normal air movement


Cardiovascular:  nl pulses, regular rate and rhythm


Gastrointestinal:  nl liver, spleen, non-tender, soft


Musculoskeletal:  nl extremities to inspection, nl gait and stance


Extremities:  normal pulses


Neurological:  CNS II-XII intact, nl mental status, nl speech, nl strength


Skin:  nl turgor, 


   No rash or lesions


Lymph:  nl lymph nodes





Results


Result Diagram:  


10/22/17 0756                                                                  

              10/22/17 0756





Results 24 hrs





Laboratory Tests








Test


  10/22/17


07:56


 


White Blood Count 5.2  


 


Red Blood Count 3.35  L


 


Hemoglobin 10.5  L


 


Hematocrit 33.0  L


 


Mean Corpuscular Volume 98.5  


 


Mean Corpuscular Hemoglobin 31.3  


 


Mean Corpuscular Hemoglobin


Concent 31.8  L


 


 


Red Cell Distribution Width 13.5  


 


Platelet Count 219  


 


Mean Platelet Volume 10.0  


 


Neutrophils % 79.8  H


 


Lymphocytes % 13.6  L


 


Monocytes % 5.4  


 


Eosinophils % 0.6  


 


Basophils % 0.4  


 


Nucleated Red Blood Cells % 0.0  


 


Neutrophils # 4.2  


 


Lymphocytes # 0.7  L


 


Monocytes # 0.3  


 


Eosinophils # 0.0  


 


Basophils # 0.0  


 


Nucleated Red Blood Cells # 0.0  


 


Sodium Level 140  


 


Potassium Level 4.5  


 


Chloride Level 111  H


 


Carbon Dioxide Level 19  L


 


Anion Gap 15  


 


Blood Urea Nitrogen 16  


 


Creatinine 0.64  


 


Glucose Level 60  #L


 


Calcium Level 8.1  L


 


Magnesium Level 1.9  


 


Total Bilirubin 1.0  


 


Direct Bilirubin 0.00  


 


Indirect Bilirubin 1.0  


 


Aspartate Amino Transf


(AST/SGOT) 25  


 


 


Alanine Aminotransferase


(ALT/SGPT) 25  


 


 


Alkaline Phosphatase 58  


 


Total Protein 7.0  


 


Albumin 3.5  


 


Globulin 3.50  H


 


Albumin/Globulin Ratio 1.00  











Medications


Medications





 Current Medications


Sodium Chloride (NS) 1,000 ml @  75 mls/hr [id number] IV  Last administered on 10/

21/17at 23:38; Admin Dose 75 MLS/HR;  Start 10/20/17 at 03:33


Ondansetron HCl (Zofran Inj) 4 mg Q6H  PRN IV NAUSEA AND/OR VOMITING Last 

administered on 10/20/17at 09:06; Admin Dose 4 MG;  Start 10/20/17 at 04:00


Pantoprazole (Protonix Iv) 40 mg BID@06,18 IV  Last administered on 10/22/17at 

05:54; Admin Dose 40 MG;  Start 10/20/17 at 04:00


Ibuprofen (Motrin) 600 mg Q6H  PRN PO PAIN Last administered on 10/22/17at 12:12

; Admin Dose 600 MG;  Start 10/22/17 at 12:00











MERCEDES BRANDT MD Oct 22, 2017 15:28

## 2017-10-22 NOTE — PN
Date/Time of Note


Date/Time of Note


DATE: 10/22/17 


TIME: 22:47





Assessment/Plan


Lines/Catheters


IV Catheter Type (from [facility]):  Saline Lock





Assessment/Plan


Chief Complaint/Hosp Course


1. Small bowel obstruction: 2/2 to adhesions +/- internal hernia within the 

lower abdomen and pelvis; +flatus; ngt out; tolerating clears


-ambulate


-if continues tolerating diet, advance diet as samantha


2. Recurrent reducible ventral hernia


-monitor for obstruction/incarceration


-weight loss


-eventual hernia repair that can be done outpatient


3. Atherosclerosis


-medical management


4. Osteoporosis


-medical management


5. Overweight: BMI 26


-weight loss encouraged


-diet and nutrition optimization


6. Normocytic normochromic anemia


-monitor and transfuse prn


7. Hypocalcemia: nutritional vs. other


-optimize nutrition as able





Thank you. Patient seen and examined in collaboration with Dr. Avel Russo.


Problems:  





Subjective


24 Hr Interval Summary


NG tube out. Patient feels ok. Improved abdominal tenderness. +flatus. No n/v/d/

dysuria, cp, palpitations, sob, congested cough. Continues to ambulate. 

Tolerating clears.





Exam/Review of Systems


Vital Signs


Vitals





 Vital Signs








  Date Time  Temp Pulse Resp B/P Pulse Ox O2 Delivery O2 Flow Rate FiO2


 


10/22/17 20:52 98.4 67 16 104/63 98   


 


10/21/17 02:00      Room Air  














 Intake and Output   


 


 10/21/17 10/21/17 10/22/17





 15:00 23:00 07:00


 


Intake Total  750 ml 718 ml


 


Output Total   0 ml


 


Balance  750 ml 718 ml











Exam


Free Text/Dictation


Constitutional:  alert, oriented, well developed


Psych:  nl mood/affect


Head:  atraumatic, normocephalic


Eyes:  nl lids, nl sclera


ENMT:  mucosa pink and moist, nl nasal mucosa & septum,


Neck:  non-tender, supple


Respiratory:  normal air movement, 


   No labored breathing


Cardiovascular:  nl pulses, regular rate and rhythm


Gastrointestinal:  bowel sounds, distended (min), soft, tender (min lower 

abdomen-cont to improve), 


   No mass


Genitourinary - Female:  nl adnexae, nl external genitalia


Musculoskeletal:  nl extremities to inspection, nl gait and stance


Extremities:  normal pulses


Neurological:  nl mental status, nl speech, nl strength


Skin:  rash or lesions


Lymph:  nl lymph nodes





Results


Result Diagram:  


10/22/17 0756                                                                  

              10/22/17 0756














TERESE HEIN NP Oct 22, 2017 22:54

## 2017-10-23 VITALS — DIASTOLIC BLOOD PRESSURE: 66 MMHG | SYSTOLIC BLOOD PRESSURE: 110 MMHG | RESPIRATION RATE: 18 BRPM

## 2017-10-23 VITALS — SYSTOLIC BLOOD PRESSURE: 109 MMHG | RESPIRATION RATE: 18 BRPM | DIASTOLIC BLOOD PRESSURE: 75 MMHG

## 2017-10-23 VITALS — SYSTOLIC BLOOD PRESSURE: 106 MMHG | DIASTOLIC BLOOD PRESSURE: 57 MMHG | RESPIRATION RATE: 18 BRPM

## 2017-10-23 VITALS — DIASTOLIC BLOOD PRESSURE: 68 MMHG | SYSTOLIC BLOOD PRESSURE: 103 MMHG | RESPIRATION RATE: 18 BRPM

## 2017-10-23 LAB
ALBUMIN SERPL-MCNC: 4.1 G/DL (ref 3.3–4.9)
ALBUMIN/GLOB SERPL: 1.24 {RATIO}
ALP SERPL-CCNC: 61 IU/L (ref 42–121)
ALT SERPL-CCNC: 29 IU/L (ref 13–69)
ANION GAP SERPL CALC-SCNC: 16 MMOL/L (ref 8–16)
AST SERPL-CCNC: 31 IU/L (ref 15–46)
BILIRUB DIRECT SERPL-MCNC: 0 MG/DL (ref 0–0.2)
BILIRUB SERPL-MCNC: 1.1 MG/DL (ref 0.2–1.3)
BUN SERPL-MCNC: 7 MG/DL (ref 7–20)
CALCIUM SERPL-MCNC: 8.4 MG/DL (ref 8.4–10.2)
CHLORIDE SERPL-SCNC: 109 MMOL/L (ref 97–110)
CO2 SERPL-SCNC: 19 MMOL/L (ref 21–31)
CREAT SERPL-MCNC: 0.59 MG/DL (ref 0.44–1)
GLOBULIN SER-MCNC: 3.3 G/DL (ref 1.3–3.2)
GLUCOSE SERPL-MCNC: 87 MG/DL (ref 70–220)
MAGNESIUM SERPL-MCNC: 1.9 MG/DL (ref 1.7–2.5)
POTASSIUM SERPL-SCNC: 4 MMOL/L (ref 3.5–5.1)
PROT SERPL-MCNC: 7.4 G/DL (ref 6.1–8.1)
SODIUM SERPL-SCNC: 140 MMOL/L (ref 135–144)

## 2017-10-23 RX ADMIN — IBUPROFEN PRN MG: 600 TABLET ORAL at 09:32

## 2017-10-23 RX ADMIN — IBUPROFEN PRN MG: 600 TABLET ORAL at 17:12

## 2017-10-23 RX ADMIN — PANTOPRAZOLE SODIUM SCH MG: 40 TABLET, DELAYED RELEASE ORAL at 17:12

## 2017-10-23 RX ADMIN — PANTOPRAZOLE SODIUM SCH MG: 40 INJECTION, POWDER, FOR SOLUTION INTRAVENOUS at 05:53

## 2017-10-23 NOTE — PN
Date/Time of Note


Date/Time of Note


DATE: 10/23/17 


TIME: 14:25





Assessment/Plan


Lines/Catheters


IV Catheter Type (from [facility]):  Saline Lock





Assessment/Plan


Chief Complaint/Hosp Course


1. Small bowel obstruction: 2/2 to adhesions +/- internal hernia within the 

lower abdomen and pelvis; +flatus


-ambulate


-if continues tolerating diet, advance diet as samantha


2. Recurrent reducible ventral hernia


-monitor for obstruction/incarceration


-weight loss


-eventual hernia repair that can be done outpatient


3. Atherosclerosis


-medical management


4. Osteoporosis


-medical management


5. Overweight: BMI 26


-weight loss encouraged


-diet and nutrition optimization


6. Normocytic normochromic anemia


-monitor and transfuse prn


7. Hypocalcemia: nutritional vs. other


-optimize nutrition as able





Thank you. Patient seen and examined in collaboration with Dr. Avel Russo.


Problems:  





Subjective


24 Hr Interval Summary


Feels well. Tolerating clear liquids. Advanced to full liquids. No abdominal 

tenderness/pain. +flatus. No fevers, chills, sob, congested cough, n/v/d/

dysuria.





Exam/Review of Systems


Vital Signs


Vitals





 Vital Signs








  Date Time  Temp Pulse Resp B/P Pulse Ox O2 Delivery O2 Flow Rate FiO2


 


10/23/17 12:30 97.9 72 18 109/75 99   


 


10/21/17 02:00      Room Air  














 Intake and Output   


 


 10/22/17 10/22/17 10/23/17





 15:00 23:00 07:00


 


Intake Total  532 ml 480 ml


 


Balance  532 ml 480 ml











Exam


Free Text/Dictation


Constitutional:  alert, oriented, well developed


Psych:  nl mood/affect


Head:  atraumatic, normocephalic


Eyes:  nl lids, nl sclera


ENMT:  mucosa pink and moist, nl nasal mucosa & septum,


Neck:  non-tender, supple


Respiratory:  normal air movement, 


   No labored breathing


Cardiovascular:  nl pulses, regular rate and rhythm


Gastrointestinal:  bowel sounds, distended (min), soft, non tender, 


   No mass


Genitourinary - Female:  nl adnexae, nl external genitalia


Musculoskeletal:  nl extremities to inspection, nl gait and stance


Extremities:  normal pulses


Neurological:  nl mental status, nl speech, nl strength


Skin:  rash or lesions


Lymph:  nl lymph nodes





Results


Result Diagram:  


10/22/17 0756                                                                  

              10/23/17 0895














TERESE HEIN NP Oct 23, 2017 14:28

## 2017-10-23 NOTE — PN
Date/Time of Note


Date/Time of Note


DATE: 10/23/17 


TIME: 17:34





Assessment/Plan


VTE Prophylaxis


VTE Prophylaxis Intervention:  ambulation





Lines/Catheters


IV Catheter Type (from Nrs):  Saline Lock





Assessment/Plan


Chief Complaint/Hosp Course


1. Small bowel obstruction: 2/2 to adhesions 


Patient is flatus positive


Patient has not had a bowel movement this point but is tolerating clear diet, 

advance to soft diet





2. Recurrent reducible ventral hernia


-monitor for obstruction/incarceration


-weight loss


-eventual hernia repair that can be done outpatient





3.  Obesity


Lifestyle changes





4. Normocytic normochromic anemia


Monitor





Prophylaxis: Ambulation


Problems:  





Subjective


24 Hr Interval Summary


Constitutional:  no complaints





Exam/Review of Systems


Vital Signs


Vitals





 Vital Signs








  Date Time  Temp Pulse Resp B/P Pulse Ox O2 Delivery O2 Flow Rate FiO2


 


10/23/17 12:30 97.9 72 18 109/75 99   


 


10/21/17 02:00      Room Air  














 Intake and Output   


 


 10/22/17 10/22/17 10/23/17





 15:00 23:00 07:00


 


Intake Total  532 ml 480 ml


 


Balance  532 ml 480 ml











Exam


Constitutional:  alert, oriented


Respiratory:  clear to auscultation


Cardiovascular:  regular rate and rhythm


Gastrointestinal:  soft, 


   No distended


Musculoskeletal:  nl extremities to inspection





Results


Result Diagram:  


10/22/17 0756                                                                  

              10/23/17 0825





Results 24 hrs





Laboratory Tests








Test


  10/23/17


08:25


 


Sodium Level 140  


 


Potassium Level 4.0  


 


Chloride Level 109  


 


Carbon Dioxide Level 19  L


 


Anion Gap 16  


 


Blood Urea Nitrogen 7  #


 


Creatinine 0.59  


 


Glucose Level 87  


 


Calcium Level 8.4  


 


Magnesium Level 1.9  


 


Total Bilirubin 1.1  


 


Direct Bilirubin 0.00  


 


Indirect Bilirubin 1.1  


 


Aspartate Amino Transf


(AST/SGOT) 31  


 


 


Alanine Aminotransferase


(ALT/SGPT) 29  


 


 


Alkaline Phosphatase 61  


 


Total Protein 7.4  


 


Albumin 4.1  


 


Globulin 3.30  H


 


Albumin/Globulin Ratio 1.24  











Medications


Medications





 Current Medications


Ondansetron HCl (Zofran Inj) 4 mg Q6H  PRN IV NAUSEA AND/OR VOMITING Last 

administered on 10/20/17at 09:06; Admin Dose 4 MG;  Start 10/20/17 at 04:00


Ibuprofen (Motrin) 600 mg Q6H  PRN PO PAIN Last administered on 10/23/17at 17:12

; Admin Dose 600 MG;  Start 10/22/17 at 12:00


Pantoprazole (Protonix Tab) 40 mg BID@06,18 PO  Last administered on 10/23/17at 

17:12; Admin Dose 40 MG;  Start 10/23/17 at 18:00











GAURAV SIDDIQUI Oct 23, 2017 17:40

## 2017-10-24 VITALS — SYSTOLIC BLOOD PRESSURE: 104 MMHG | DIASTOLIC BLOOD PRESSURE: 58 MMHG | RESPIRATION RATE: 18 BRPM

## 2017-10-24 VITALS — DIASTOLIC BLOOD PRESSURE: 60 MMHG | SYSTOLIC BLOOD PRESSURE: 110 MMHG | RESPIRATION RATE: 18 BRPM

## 2017-10-24 VITALS — SYSTOLIC BLOOD PRESSURE: 115 MMHG | DIASTOLIC BLOOD PRESSURE: 70 MMHG | RESPIRATION RATE: 18 BRPM

## 2017-10-24 LAB
ALBUMIN SERPL-MCNC: 3.8 G/DL (ref 3.3–4.9)
ALBUMIN/GLOB SERPL: 1 {RATIO}
ALP SERPL-CCNC: 45 IU/L (ref 42–121)
ALT SERPL-CCNC: 31 IU/L (ref 13–69)
ANION GAP SERPL CALC-SCNC: 13 MMOL/L (ref 8–16)
AST SERPL-CCNC: 39 IU/L (ref 15–46)
BILIRUB DIRECT SERPL-MCNC: 0 MG/DL (ref 0–0.2)
BILIRUB SERPL-MCNC: 0.9 MG/DL (ref 0.2–1.3)
BUN SERPL-MCNC: 5 MG/DL (ref 7–20)
CALCIUM SERPL-MCNC: 9 MG/DL (ref 8.4–10.2)
CHLORIDE SERPL-SCNC: 109 MMOL/L (ref 97–110)
CO2 SERPL-SCNC: 24 MMOL/L (ref 21–31)
CREAT SERPL-MCNC: 0.6 MG/DL (ref 0.44–1)
GLOBULIN SER-MCNC: 3.8 G/DL (ref 1.3–3.2)
GLUCOSE SERPL-MCNC: 97 MG/DL (ref 70–220)
MAGNESIUM SERPL-MCNC: 1.7 MG/DL (ref 1.7–2.5)
POTASSIUM SERPL-SCNC: 4.8 MMOL/L (ref 3.5–5.1)
PROT SERPL-MCNC: 7.6 G/DL (ref 6.1–8.1)
SODIUM SERPL-SCNC: 141 MMOL/L (ref 135–144)

## 2017-10-24 RX ADMIN — PANTOPRAZOLE SODIUM SCH MG: 40 TABLET, DELAYED RELEASE ORAL at 05:58

## 2017-10-24 NOTE — DS
Date/Time of Note


Date/Time of Note


DATE: 10/24/17 


TIME: 16:11





Discharge Summary


Admission/Discharge Info


Admit Date/Time


Oct 20, 2017 at 02:52


Discharge Date/Time


October 24, 2017


Discharge Diagnosis


1. Small bowel obstruction: 2/2 to adhesions-resolved


Patient is tolerating a regular diet and is having bowel movements





2. Recurrent reducible ventral hernia


-eventual hernia repair that can be done outpatient





3.  Obesity


Lifestyle changes





4. Normocytic normochromic anemia


Stable


Patient Condition:  Good


Hospital Course


Patient is a 67-year-old female with a history of multiple abdominal surgeries 

and multiple small bowel obstructions, presenting to the ER because of 

abdominal pain, nausea, vomiting.  Patient had a CT abdomen that showed small 

bowel obstruction, patient was seen by surgery and her condition was for 

conservative care.  Patient's diet was slowly advanced and ultimately she did 

tolerate regular diet and was having bowel movements.  Patient was clear for DC 

per surgery.  On day of discharge patient vitals, labs physical exam stable no 

further acute complaints questions are answered.


Home Meds


Active Scripts


Docusate Sodium* (Colace*) 100 Mg Capsule, 100 MG PO BID for 30 Days, CAP


   Prov:LITA TORRES         12/16/16


Alendronate Sodium* (Fosamax*) 70 Mg Tablet, 70 MG PO Th@0730 for 30 Days, TAB


   Prov:LITA TORRES         12/16/16


Reported Medications


Omeprazole* (Omeprazole*) 40 Mg Capsule.dr, 40 MG PO DAILY, #30 CAP


   10/20/17


Discontinued Scripts


Ibuprofen* (Motrin*) 800 Mg Tab, 7 MG PO Q6H Y for PAIN for 7 Days, #30 TAB


   Prov:TITI DAHL MD         5/9/17


Follow-up Plan


FOLLOW UP WITH YOUR PRIMARY CARE PHYSICIAN IN 1-2 WEEKS


Primary Care Provider


Jorge Parrish


Time spent on discharge:   > 30 minutes











GAURAV SIDDIQUI Oct 24, 2017 16:16

## 2017-10-24 NOTE — PDOCDIS
Discharge Instructions


CONDITION


Patient Condition:  Good





HOME CARE INSTRUCTIONS:


Diet Instructions:  Regular





ACTIVITY:








Activity Restrictions:   No Restrictions











FOLLOW UP/APPOINTMENTS


Follow-up Plan


FOLLOW UP WITH YOUR PRIMARY CARE PHYSICIAN IN 1-2 WEEKS











GAURAV SIDDIQUI Oct 24, 2017 16:11

## 2017-10-24 NOTE — PN
Date/Time of Note


Date/Time of Note


DATE: 10/24/17 


TIME: 14:59





Assessment/Plan


Lines/Catheters


IV Catheter Type (from [facility]):  Saline Lock





Assessment/Plan


Chief Complaint/Hosp Course


1. Small bowel obstruction: 2/2 to adhesions +/- internal hernia within the 

lower abdomen and pelvis; +bowel function and tolerating solid diet


-patient may be discharged per medical team.


2. Recurrent reducible ventral hernia


-monitor for obstruction/incarceration


-weight loss


-eventual hernia repair that can be done outpatient


3. Atherosclerosis


-medical management


4. Osteoporosis


-medical management


5. Overweight: BMI 26


-weight loss encouraged


-diet and nutrition optimization


6. Normocytic normochromic anemia


-monitor and transfuse prn


7. Hypocalcemia: nutritional vs. other


-optimize nutrition as able





Thank you. Patient seen and examined in collaboration with Dr. Avel Russo.


Problems:  





Subjective


24 Hr Interval Summary


Feels well. + bowel function. No pain. Tolerating diet. No fevers, chills, sob, 

congested cough, n/v/d/dysuria.





Exam/Review of Systems


Vital Signs


Vitals





 Vital Signs








  Date Time  Temp Pulse Resp B/P Pulse Ox O2 Delivery O2 Flow Rate FiO2


 


10/24/17 14:00 98.2 70 18 110/60 100   


 


10/21/17 02:00      Room Air  














 Intake and Output   


 


 10/23/17 10/23/17 10/24/17





 15:00 23:00 07:00


 


Intake Total  1880 ml 


 


Balance  1880 ml 











Exam


Free Text/Dictation


Constitutional:  alert, oriented, well developed


Psych:  nl mood/affect


Head:  atraumatic, normocephalic


Eyes:  nl lids, nl sclera


ENMT:  mucosa pink and moist, nl nasal mucosa & septum,


Neck:  non-tender, supple


Respiratory:  normal air movement, 


   No labored breathing


Cardiovascular:  nl pulses, regular rate and rhythm


Gastrointestinal:  bowel sounds, nondistended, soft, non tender, 


   No mass


Genitourinary - Female:  nl adnexae, nl external genitalia


Musculoskeletal:  nl extremities to inspection, nl gait and stance


Extremities:  normal pulses


Neurological:  nl mental status, nl speech, nl strength


Skin:  rash or lesions


Lymph:  nl lymph nodes





Results


Result Diagram:  


10/22/17 0756                                                                  

              10/24/17 0614














TERESE HEIN NP Oct 24, 2017 15:02

## 2018-03-22 ENCOUNTER — HOSPITAL ENCOUNTER (OUTPATIENT)
Dept: HOSPITAL 91 - GIL | Age: 68
Discharge: HOME | End: 2018-03-22
Payer: COMMERCIAL

## 2018-03-22 ENCOUNTER — HOSPITAL ENCOUNTER (OUTPATIENT)
Age: 68
Discharge: HOME | End: 2018-03-22

## 2018-03-22 DIAGNOSIS — Z79.84: ICD-10-CM

## 2018-03-22 DIAGNOSIS — I10: ICD-10-CM

## 2018-03-22 DIAGNOSIS — Z12.11: Primary | ICD-10-CM

## 2018-03-22 DIAGNOSIS — E11.9: ICD-10-CM

## 2018-03-22 PROCEDURE — 45378 DIAGNOSTIC COLONOSCOPY: CPT

## 2018-07-12 ENCOUNTER — HOSPITAL ENCOUNTER (INPATIENT)
Dept: HOSPITAL 91 - E/R | Age: 68
LOS: 3 days | Discharge: HOME | DRG: 390 | End: 2018-07-15
Payer: COMMERCIAL

## 2018-07-12 DIAGNOSIS — Z90.710: ICD-10-CM

## 2018-07-12 DIAGNOSIS — M81.0: ICD-10-CM

## 2018-07-12 DIAGNOSIS — K56.51: Primary | ICD-10-CM

## 2018-07-12 DIAGNOSIS — Z85.41: ICD-10-CM

## 2018-07-12 PROCEDURE — 81003 URINALYSIS AUTO W/O SCOPE: CPT

## 2018-07-12 PROCEDURE — 84100 ASSAY OF PHOSPHORUS: CPT

## 2018-07-12 PROCEDURE — 74250 X-RAY XM SM INT 1CNTRST STD: CPT

## 2018-07-12 PROCEDURE — 96375 TX/PRO/DX INJ NEW DRUG ADDON: CPT

## 2018-07-12 PROCEDURE — 96374 THER/PROPH/DIAG INJ IV PUSH: CPT

## 2018-07-12 PROCEDURE — 80048 BASIC METABOLIC PNL TOTAL CA: CPT

## 2018-07-12 PROCEDURE — 83735 ASSAY OF MAGNESIUM: CPT

## 2018-07-12 PROCEDURE — 83690 ASSAY OF LIPASE: CPT

## 2018-07-12 PROCEDURE — 83036 HEMOGLOBIN GLYCOSYLATED A1C: CPT

## 2018-07-12 PROCEDURE — 36415 COLL VENOUS BLD VENIPUNCTURE: CPT

## 2018-07-12 PROCEDURE — 84436 ASSAY OF TOTAL THYROXINE: CPT

## 2018-07-12 PROCEDURE — 80061 LIPID PANEL: CPT

## 2018-07-12 PROCEDURE — 80053 COMPREHEN METABOLIC PANEL: CPT

## 2018-07-12 PROCEDURE — 74176 CT ABD & PELVIS W/O CONTRAST: CPT

## 2018-07-12 PROCEDURE — 99285 EMERGENCY DEPT VISIT HI MDM: CPT

## 2018-07-12 PROCEDURE — 84479 ASSAY OF THYROID (T3 OR T4): CPT

## 2018-07-12 PROCEDURE — 85025 COMPLETE CBC W/AUTO DIFF WBC: CPT

## 2018-07-13 ENCOUNTER — HOSPITAL ENCOUNTER (INPATIENT)
Age: 68
LOS: 2 days | Discharge: HOME | DRG: 390 | End: 2018-07-15

## 2018-07-13 LAB
ABNORMAL IP MESSAGE: 1
ADD MAN DIFF?: NO
ALANINE AMINOTRANSFERASE: 22 IU/L (ref 13–69)
ALBUMIN/GLOBULIN RATIO: 1.36
ALBUMIN: 4.5 G/DL (ref 3.3–4.9)
ALKALINE PHOSPHATASE: 55 IU/L (ref 42–121)
ANION GAP: 12 (ref 8–16)
ASPARTATE AMINO TRANSFERASE: 27 IU/L (ref 15–46)
BASOPHIL #: 0 10^3/UL (ref 0–0.1)
BASOPHILS %: 0.2 % (ref 0–2)
BILIRUBIN,DIRECT: 0 MG/DL (ref 0–0.2)
BILIRUBIN,TOTAL: 0.6 MG/DL (ref 0.2–1.3)
BLOOD UREA NITROGEN: 19 MG/DL (ref 7–20)
CALCIUM: 9.3 MG/DL (ref 8.4–10.2)
CARBON DIOXIDE: 27 MMOL/L (ref 21–31)
CHLORIDE: 106 MMOL/L (ref 97–110)
CREATININE: 0.67 MG/DL (ref 0.44–1)
EOSINOPHILS #: 0 10^3/UL (ref 0–0.5)
EOSINOPHILS %: 0.2 % (ref 0–7)
GLOBULIN: 3.3 G/DL (ref 1.3–3.2)
GLUCOSE: 134 MG/DL (ref 70–220)
HEMATOCRIT: 35.4 % (ref 37–47)
HEMOGLOBIN: 11.7 G/DL (ref 12–16)
LIPASE: 58 U/L (ref 23–300)
LYMPHOCYTES #: 0.3 10^3/UL (ref 0.8–2.9)
LYMPHOCYTES %: 5.1 % (ref 15–51)
MEAN CORPUSCULAR HEMOGLOBIN: 31.4 PG (ref 29–33)
MEAN CORPUSCULAR HGB CONC: 33.1 G/DL (ref 32–37)
MEAN CORPUSCULAR VOLUME: 94.9 FL (ref 82–101)
MEAN PLATELET VOLUME: 10 FL (ref 7.4–10.4)
MONOCYTE #: 0.3 10^3/UL (ref 0.3–0.9)
MONOCYTES %: 5.1 % (ref 0–11)
NEUTROPHIL #: 5.8 10^3/UL (ref 1.6–7.5)
NEUTROPHILS %: 88.9 % (ref 39–77)
NUCLEATED RED BLOOD CELLS #: 0 10^3/UL (ref 0–0)
NUCLEATED RED BLOOD CELLS%: 0 /100WBC (ref 0–0)
PLATELET COUNT: 231 10^3/UL (ref 140–415)
POSITIVE DIFF: (no result)
POTASSIUM: 4.1 MMOL/L (ref 3.5–5.1)
RED BLOOD COUNT: 3.73 10^6/UL (ref 4.2–5.4)
RED CELL DISTRIBUTION WIDTH: 14.2 % (ref 11.5–14.5)
SODIUM: 141 MMOL/L (ref 135–144)
TOTAL PROTEIN: 7.8 G/DL (ref 6.1–8.1)
URINE KETONES (DIP) POC: (no result)
URINE PH (DIP) POC: 6.5 (ref 5–8.5)
WHITE BLOOD COUNT: 6.5 10^3/UL (ref 4.8–10.8)

## 2018-07-13 RX ADMIN — THIAMINE HYDROCHLORIDE 1 MLS/HR: 100 INJECTION, SOLUTION INTRAMUSCULAR; INTRAVENOUS at 23:27

## 2018-07-13 RX ADMIN — PIPERACILLIN SODIUM AND TAZOBACTAM SODIUM 1 MLS/HR: 3; .375 INJECTION, POWDER, LYOPHILIZED, FOR SOLUTION INTRAVENOUS at 03:46

## 2018-07-13 RX ADMIN — THIAMINE HYDROCHLORIDE 1 MLS/HR: 100 INJECTION, SOLUTION INTRAMUSCULAR; INTRAVENOUS at 12:06

## 2018-07-13 RX ADMIN — MORPHINE SULFATE 1 MG: 2 INJECTION, SOLUTION INTRAMUSCULAR; INTRAVENOUS at 11:54

## 2018-07-13 RX ADMIN — ONDANSETRON HYDROCHLORIDE 1 MG: 2 INJECTION, SOLUTION INTRAMUSCULAR; INTRAVENOUS at 03:54

## 2018-07-13 RX ADMIN — ONDANSETRON HYDROCHLORIDE 1 MG: 2 INJECTION, SOLUTION INTRAMUSCULAR; INTRAVENOUS at 11:55

## 2018-07-13 RX ADMIN — THIAMINE HYDROCHLORIDE 1 MLS/HR: 100 INJECTION, SOLUTION INTRAMUSCULAR; INTRAVENOUS at 03:46

## 2018-07-13 RX ADMIN — MORPHINE SULFATE 1 MG: 2 INJECTION, SOLUTION INTRAMUSCULAR; INTRAVENOUS at 03:55

## 2018-07-13 RX ADMIN — MORPHINE SULFATE 1 MG: 2 INJECTION, SOLUTION INTRAMUSCULAR; INTRAVENOUS at 01:27

## 2018-07-13 RX ADMIN — ONDANSETRON HYDROCHLORIDE 1 MG: 2 INJECTION, SOLUTION INTRAMUSCULAR; INTRAVENOUS at 01:26

## 2018-07-13 RX ADMIN — MORPHINE SULFATE 1 MG: 2 INJECTION, SOLUTION INTRAMUSCULAR; INTRAVENOUS at 18:22

## 2018-07-13 RX ADMIN — ONDANSETRON HYDROCHLORIDE 1 MG: 2 INJECTION, SOLUTION INTRAMUSCULAR; INTRAVENOUS at 18:22

## 2018-07-14 LAB
ADD MAN DIFF?: NO
ALANINE AMINOTRANSFERASE: 19 IU/L (ref 13–69)
ALBUMIN/GLOBULIN RATIO: 1.21
ALBUMIN: 3.4 G/DL (ref 3.3–4.9)
ALKALINE PHOSPHATASE: 43 IU/L (ref 42–121)
ANION GAP: 9 (ref 8–16)
ASPARTATE AMINO TRANSFERASE: 20 IU/L (ref 15–46)
BASOPHIL #: 0 10^3/UL (ref 0–0.1)
BASOPHILS %: 0.3 % (ref 0–2)
BILIRUBIN,DIRECT: 0 MG/DL (ref 0–0.2)
BILIRUBIN,TOTAL: 0.6 MG/DL (ref 0.2–1.3)
BLOOD UREA NITROGEN: 16 MG/DL (ref 7–20)
CALCIUM: 7.9 MG/DL (ref 8.4–10.2)
CARBON DIOXIDE: 24 MMOL/L (ref 21–31)
CHLORIDE: 116 MMOL/L (ref 97–110)
CHOL/HDL RATIO: 2.1 RATIO
CHOLESTEROL: 131 MG/DL (ref 100–200)
CREATININE: 0.53 MG/DL (ref 0.44–1)
EOSINOPHILS #: 0 10^3/UL (ref 0–0.5)
EOSINOPHILS %: 1 % (ref 0–7)
FREE THYROXINE INDEX (CALC): 1.75 UG/ML (ref 0.65–3.89)
GLOBULIN: 2.8 G/DL (ref 1.3–3.2)
GLUCOSE: 89 MG/DL (ref 70–220)
HDL CHOLESTEROL: 61 MG/DL (ref 33–92)
HEMATOCRIT: 31.1 % (ref 37–47)
HEMOGLOBIN A1C: 5.5 % (ref 0–5.9)
HEMOGLOBIN: 10 G/DL (ref 12–16)
LDL CHOLESTEROL,CALCULATED: 56 MG/DL
LYMPHOCYTES #: 0.8 10^3/UL (ref 0.8–2.9)
LYMPHOCYTES %: 19.9 % (ref 15–51)
MAGNESIUM: 1.9 MG/DL (ref 1.7–2.5)
MEAN CORPUSCULAR HEMOGLOBIN: 31 PG (ref 29–33)
MEAN CORPUSCULAR HGB CONC: 32.2 G/DL (ref 32–37)
MEAN CORPUSCULAR VOLUME: 96.3 FL (ref 82–101)
MEAN PLATELET VOLUME: 10 FL (ref 7.4–10.4)
MONOCYTE #: 0.3 10^3/UL (ref 0.3–0.9)
MONOCYTES %: 6.5 % (ref 0–11)
NEUTROPHIL #: 2.8 10^3/UL (ref 1.6–7.5)
NEUTROPHILS %: 72 % (ref 39–77)
NUCLEATED RED BLOOD CELLS #: 0 10^3/UL (ref 0–0)
NUCLEATED RED BLOOD CELLS%: 0 /100WBC (ref 0–0)
PLATELET COUNT: 233 10^3/UL (ref 140–415)
POTASSIUM: 3.5 MMOL/L (ref 3.5–5.1)
RED BLOOD COUNT: 3.23 10^6/UL (ref 4.2–5.4)
RED CELL DISTRIBUTION WIDTH: 14.7 % (ref 11.5–14.5)
SODIUM: 145 MMOL/L (ref 135–144)
T3 UPTAKE: 33.1 % (ref 23.5–40.5)
T4 (THYROXINE): 5.3 UG/DL (ref 5.5–11)
TOTAL PROTEIN: 6.2 G/DL (ref 6.1–8.1)
TRIGLYCERIDES: 70 MG/DL (ref 0–149)
WHITE BLOOD COUNT: 3.8 10^3/UL (ref 4.8–10.8)

## 2018-07-14 RX ADMIN — ACETAMINOPHEN 1 MG: 325 TABLET, FILM COATED ORAL at 16:32

## 2018-07-14 RX ADMIN — ONDANSETRON HYDROCHLORIDE 1 MG: 2 INJECTION, SOLUTION INTRAMUSCULAR; INTRAVENOUS at 11:11

## 2018-07-14 RX ADMIN — MORPHINE SULFATE 1 MG: 2 INJECTION, SOLUTION INTRAMUSCULAR; INTRAVENOUS at 11:11

## 2018-07-14 RX ADMIN — THIAMINE HYDROCHLORIDE 1 MLS/HR: 100 INJECTION, SOLUTION INTRAMUSCULAR; INTRAVENOUS at 09:16

## 2018-07-14 RX ADMIN — ENOXAPARIN SODIUM 1 MG: 100 INJECTION SUBCUTANEOUS at 09:18

## 2018-07-15 LAB
ANION GAP: 8 (ref 8–16)
BLOOD UREA NITROGEN: 11 MG/DL (ref 7–20)
CALCIUM: 8 MG/DL (ref 8.4–10.2)
CARBON DIOXIDE: 26 MMOL/L (ref 21–31)
CHLORIDE: 110 MMOL/L (ref 97–110)
CREATININE: 0.53 MG/DL (ref 0.44–1)
GLUCOSE: 85 MG/DL (ref 70–220)
MAGNESIUM: 2 MG/DL (ref 1.7–2.5)
PHOSPHORUS: 2 MG/DL (ref 2.5–4.9)
POTASSIUM: 3.2 MMOL/L (ref 3.5–5.1)
SODIUM: 141 MMOL/L (ref 135–144)

## 2018-07-15 RX ADMIN — POTASSIUM CHLORIDE 1 MEQ: 1500 TABLET, EXTENDED RELEASE ORAL at 18:05

## 2018-07-15 RX ADMIN — POTASSIUM & SODIUM PHOSPHATES POWDER PACK 280-160-250 MG 1 MG: 280-160-250 PACK at 14:45

## 2018-07-15 RX ADMIN — ENOXAPARIN SODIUM 1 MG: 100 INJECTION SUBCUTANEOUS at 09:35

## 2018-09-08 ENCOUNTER — HOSPITAL ENCOUNTER (INPATIENT)
Dept: HOSPITAL 91 - E/R | Age: 68
LOS: 3 days | Discharge: HOME | DRG: 390 | End: 2018-09-11
Payer: COMMERCIAL

## 2018-09-08 ENCOUNTER — HOSPITAL ENCOUNTER (INPATIENT)
Age: 68
LOS: 3 days | Discharge: HOME | DRG: 390 | End: 2018-09-11

## 2018-09-08 DIAGNOSIS — Z90.710: ICD-10-CM

## 2018-09-08 DIAGNOSIS — Z85.43: ICD-10-CM

## 2018-09-08 DIAGNOSIS — M81.0: ICD-10-CM

## 2018-09-08 DIAGNOSIS — K56.51: Primary | ICD-10-CM

## 2018-09-08 DIAGNOSIS — Z92.3: ICD-10-CM

## 2018-09-08 DIAGNOSIS — Z85.42: ICD-10-CM

## 2018-09-08 DIAGNOSIS — K42.9: ICD-10-CM

## 2018-09-08 LAB
ABNORMAL IP MESSAGE: 1
ADD MAN DIFF?: NO
ALANINE AMINOTRANSFERASE: 18 IU/L (ref 13–69)
ALBUMIN/GLOBULIN RATIO: 1.21
ALBUMIN: 4.5 G/DL (ref 3.3–4.9)
ALKALINE PHOSPHATASE: 59 IU/L (ref 42–121)
ANION GAP: 14 (ref 8–16)
ASPARTATE AMINO TRANSFERASE: 27 IU/L (ref 15–46)
BASOPHIL #: 0 10^3/UL (ref 0–0.1)
BASOPHILS %: 0.2 % (ref 0–2)
BILIRUBIN,DIRECT: 0 MG/DL (ref 0–0.2)
BILIRUBIN,TOTAL: 0.9 MG/DL (ref 0.2–1.3)
BLOOD UREA NITROGEN: 18 MG/DL (ref 7–20)
CALCIUM: 9.3 MG/DL (ref 8.4–10.2)
CARBON DIOXIDE: 24 MMOL/L (ref 21–31)
CHLORIDE: 105 MMOL/L (ref 97–110)
CREATININE: 0.59 MG/DL (ref 0.44–1)
EOSINOPHILS #: 0 10^3/UL (ref 0–0.5)
EOSINOPHILS %: 0.3 % (ref 0–7)
GLOBULIN: 3.7 G/DL (ref 1.3–3.2)
GLUCOSE: 123 MG/DL (ref 70–220)
HEMATOCRIT: 35.9 % (ref 37–47)
HEMOGLOBIN: 12.1 G/DL (ref 12–16)
IMMATURE GRANS #M: 0.01 10^3/UL (ref 0–0.03)
IMMATURE GRANS % (M): 0.2 % (ref 0–0.43)
LIPASE: 327 U/L (ref 23–300)
LYMPHOCYTES #: 0.5 10^3/UL (ref 0.8–2.9)
LYMPHOCYTES %: 8.5 % (ref 15–51)
MEAN CORPUSCULAR HEMOGLOBIN: 31.7 PG (ref 29–33)
MEAN CORPUSCULAR HGB CONC: 33.7 G/DL (ref 32–37)
MEAN CORPUSCULAR VOLUME: 94 FL (ref 82–101)
MEAN PLATELET VOLUME: 9.8 FL (ref 7.4–10.4)
MONOCYTE #: 0.2 10^3/UL (ref 0.3–0.9)
MONOCYTES %: 3.8 % (ref 0–11)
NEUTROPHIL #: 5.5 10^3/UL (ref 1.6–7.5)
NEUTROPHILS %: 87 % (ref 39–77)
NUCLEATED RED BLOOD CELLS #: 0 10^3/UL (ref 0–0)
NUCLEATED RED BLOOD CELLS%: 0 /100WBC (ref 0–0)
PLATELET COUNT: 241 10^3/UL (ref 140–415)
POSITIVE DIFF: (no result)
POTASSIUM: 4 MMOL/L (ref 3.5–5.1)
RED BLOOD COUNT: 3.82 10^6/UL (ref 4.2–5.4)
RED CELL DISTRIBUTION WIDTH: 14.3 % (ref 11.5–14.5)
SODIUM: 139 MMOL/L (ref 135–144)
TOTAL PROTEIN: 8.2 G/DL (ref 6.1–8.1)
WHITE BLOOD COUNT: 6.3 10^3/UL (ref 4.8–10.8)

## 2018-09-08 PROCEDURE — 80053 COMPREHEN METABOLIC PANEL: CPT

## 2018-09-08 PROCEDURE — 93005 ELECTROCARDIOGRAM TRACING: CPT

## 2018-09-08 PROCEDURE — 80069 RENAL FUNCTION PANEL: CPT

## 2018-09-08 PROCEDURE — 99285 EMERGENCY DEPT VISIT HI MDM: CPT

## 2018-09-08 PROCEDURE — 71045 X-RAY EXAM CHEST 1 VIEW: CPT

## 2018-09-08 PROCEDURE — 85025 COMPLETE CBC W/AUTO DIFF WBC: CPT

## 2018-09-08 PROCEDURE — 84100 ASSAY OF PHOSPHORUS: CPT

## 2018-09-08 PROCEDURE — 96374 THER/PROPH/DIAG INJ IV PUSH: CPT

## 2018-09-08 PROCEDURE — 36415 COLL VENOUS BLD VENIPUNCTURE: CPT

## 2018-09-08 PROCEDURE — 74176 CT ABD & PELVIS W/O CONTRAST: CPT

## 2018-09-08 PROCEDURE — 83735 ASSAY OF MAGNESIUM: CPT

## 2018-09-08 PROCEDURE — 74250 X-RAY XM SM INT 1CNTRST STD: CPT

## 2018-09-08 PROCEDURE — 83690 ASSAY OF LIPASE: CPT

## 2018-09-08 PROCEDURE — 80048 BASIC METABOLIC PNL TOTAL CA: CPT

## 2018-09-08 PROCEDURE — 96375 TX/PRO/DX INJ NEW DRUG ADDON: CPT

## 2018-09-08 RX ADMIN — THIAMINE HYDROCHLORIDE 1 MLS/HR: 100 INJECTION, SOLUTION INTRAMUSCULAR; INTRAVENOUS at 15:44

## 2018-09-08 RX ADMIN — LIDOCAINE HYDROCHLORIDE 1 ML: 20 JELLY TOPICAL at 19:08

## 2018-09-08 RX ADMIN — ONDANSETRON HYDROCHLORIDE 1 MG: 2 INJECTION, SOLUTION INTRAMUSCULAR; INTRAVENOUS at 15:44

## 2018-09-08 RX ADMIN — KETOROLAC TROMETHAMINE 1 MG: 15 INJECTION, SOLUTION INTRAMUSCULAR; INTRAVENOUS at 18:59

## 2018-09-08 RX ADMIN — ASCORBIC ACID, VITAMIN A PALMITATE, CHOLECALCIFEROL, THIAMINE HYDROCHLORIDE, RIBOFLAVIN-5 PHOSPHATE SODIUM, PYRIDOXINE HYDROCHLORIDE, NIACINAMIDE, DEXPANTHENOL, ALPHA-TOCOPHEROL ACETATE, VITAMIN K1, FOLIC ACID, BIOTIN, CYANOCOBALAMIN 1 MLS/HR: 200; 3300; 200; 6; 3.6; 6; 40; 15; 10; 150; 600; 60; 5 INJECTION, SOLUTION INTRAVENOUS at 21:20

## 2018-09-09 LAB
ADD MAN DIFF?: NO
ANION GAP: 7 (ref 8–16)
BASOPHIL #: 0 10^3/UL (ref 0–0.1)
BASOPHILS %: 0.7 % (ref 0–2)
BLOOD UREA NITROGEN: 13 MG/DL (ref 7–20)
CALCIUM: 7.8 MG/DL (ref 8.4–10.2)
CARBON DIOXIDE: 27 MMOL/L (ref 21–31)
CHLORIDE: 108 MMOL/L (ref 97–110)
CREATININE: 0.55 MG/DL (ref 0.44–1)
EOSINOPHILS #: 0 10^3/UL (ref 0–0.5)
EOSINOPHILS %: 1.3 % (ref 0–7)
GLUCOSE: 109 MG/DL (ref 70–220)
HEMATOCRIT: 30.4 % (ref 37–47)
HEMOGLOBIN: 10 G/DL (ref 12–16)
IMMATURE GRANS #M: 0.01 10^3/UL (ref 0–0.03)
IMMATURE GRANS % (M): 0.3 % (ref 0–0.43)
LYMPHOCYTES #: 0.9 10^3/UL (ref 0.8–2.9)
LYMPHOCYTES %: 29 % (ref 15–51)
MAGNESIUM: 2.1 MG/DL (ref 1.7–2.5)
MEAN CORPUSCULAR HEMOGLOBIN: 31.3 PG (ref 29–33)
MEAN CORPUSCULAR HGB CONC: 32.9 G/DL (ref 32–37)
MEAN CORPUSCULAR VOLUME: 95.3 FL (ref 82–101)
MEAN PLATELET VOLUME: 10.2 FL (ref 7.4–10.4)
MONOCYTE #: 0.2 10^3/UL (ref 0.3–0.9)
MONOCYTES %: 8 % (ref 0–11)
NEUTROPHIL #: 1.8 10^3/UL (ref 1.6–7.5)
NEUTROPHILS %: 60.7 % (ref 39–77)
NUCLEATED RED BLOOD CELLS #: 0 10^3/UL (ref 0–0)
NUCLEATED RED BLOOD CELLS%: 0 /100WBC (ref 0–0)
PHOSPHORUS: 2.5 MG/DL (ref 2.5–4.9)
PLATELET COUNT: 214 10^3/UL (ref 140–415)
POTASSIUM: 3.5 MMOL/L (ref 3.5–5.1)
RED BLOOD COUNT: 3.19 10^6/UL (ref 4.2–5.4)
RED CELL DISTRIBUTION WIDTH: 14.4 % (ref 11.5–14.5)
SODIUM: 138 MMOL/L (ref 135–144)
WHITE BLOOD COUNT: 3 10^3/UL (ref 4.8–10.8)

## 2018-09-09 RX ADMIN — DEXTROSE, SODIUM CHLORIDE, AND POTASSIUM CHLORIDE 1 MLS/HR: 5; .45; .15 INJECTION INTRAVENOUS at 14:10

## 2018-09-09 RX ADMIN — PANTOPRAZOLE SODIUM 1 MG: 40 INJECTION, POWDER, FOR SOLUTION INTRAVENOUS at 05:12

## 2018-09-09 RX ADMIN — DIATRIZOATE MEGLUMINE AND DIATRIZOATE SODIUM 1 ML: 660; 100 LIQUID ORAL; RECTAL at 16:15

## 2018-09-09 RX ADMIN — ASCORBIC ACID, VITAMIN A PALMITATE, CHOLECALCIFEROL, THIAMINE HYDROCHLORIDE, RIBOFLAVIN-5 PHOSPHATE SODIUM, PYRIDOXINE HYDROCHLORIDE, NIACINAMIDE, DEXPANTHENOL, ALPHA-TOCOPHEROL ACETATE, VITAMIN K1, FOLIC ACID, BIOTIN, CYANOCOBALAMIN 1 MLS/HR: 200; 3300; 200; 6; 3.6; 6; 40; 15; 10; 150; 600; 60; 5 INJECTION, SOLUTION INTRAVENOUS at 04:12

## 2018-09-09 RX ADMIN — ASCORBIC ACID, VITAMIN A PALMITATE, CHOLECALCIFEROL, THIAMINE HYDROCHLORIDE, RIBOFLAVIN-5 PHOSPHATE SODIUM, PYRIDOXINE HYDROCHLORIDE, NIACINAMIDE, DEXPANTHENOL, ALPHA-TOCOPHEROL ACETATE, VITAMIN K1, FOLIC ACID, BIOTIN, CYANOCOBALAMIN 1 MLS/HR: 200; 3300; 200; 6; 3.6; 6; 40; 15; 10; 150; 600; 60; 5 INJECTION, SOLUTION INTRAVENOUS at 05:12

## 2018-09-10 LAB
ADD MAN DIFF?: NO
ALBUMIN: 3.4 G/DL (ref 3.3–4.9)
ANION GAP: 10 (ref 8–16)
BASOPHIL #: 0 10^3/UL (ref 0–0.1)
BASOPHILS %: 0.2 % (ref 0–2)
BLOOD UREA NITROGEN: 10 MG/DL (ref 7–20)
CALCIUM: 8.2 MG/DL (ref 8.4–10.2)
CARBON DIOXIDE: 24 MMOL/L (ref 21–31)
CHLORIDE: 114 MMOL/L (ref 97–110)
CREATININE: 0.51 MG/DL (ref 0.44–1)
EOSINOPHILS #: 0 10^3/UL (ref 0–0.5)
EOSINOPHILS %: 0.9 % (ref 0–7)
GLUCOSE: 119 MG/DL (ref 70–220)
HEMATOCRIT: 32.2 % (ref 37–47)
HEMOGLOBIN: 10.3 G/DL (ref 12–16)
IMMATURE GRANS #M: 0.01 10^3/UL (ref 0–0.03)
IMMATURE GRANS % (M): 0.2 % (ref 0–0.43)
LYMPHOCYTES #: 0.7 10^3/UL (ref 0.8–2.9)
LYMPHOCYTES %: 15.4 % (ref 15–51)
MAGNESIUM: 2.3 MG/DL (ref 1.7–2.5)
MEAN CORPUSCULAR HEMOGLOBIN: 30.9 PG (ref 29–33)
MEAN CORPUSCULAR HGB CONC: 32 G/DL (ref 32–37)
MEAN CORPUSCULAR VOLUME: 96.7 FL (ref 82–101)
MEAN PLATELET VOLUME: 9.9 FL (ref 7.4–10.4)
MONOCYTE #: 0.3 10^3/UL (ref 0.3–0.9)
MONOCYTES %: 6.5 % (ref 0–11)
NEUTROPHIL #: 3.3 10^3/UL (ref 1.6–7.5)
NEUTROPHILS %: 76.8 % (ref 39–77)
NUCLEATED RED BLOOD CELLS #: 0 10^3/UL (ref 0–0)
NUCLEATED RED BLOOD CELLS%: 0 /100WBC (ref 0–0)
PHOSPHORUS: 2.3 MG/DL (ref 2.5–4.9)
PLATELET COUNT: 224 10^3/UL (ref 140–415)
POTASSIUM: 3.7 MMOL/L (ref 3.5–5.1)
RED BLOOD COUNT: 3.33 10^6/UL (ref 4.2–5.4)
RED CELL DISTRIBUTION WIDTH: 14.6 % (ref 11.5–14.5)
SODIUM: 144 MMOL/L (ref 135–144)
WHITE BLOOD COUNT: 4.3 10^3/UL (ref 4.8–10.8)

## 2018-09-10 RX ADMIN — DEXTROSE, SODIUM CHLORIDE, AND POTASSIUM CHLORIDE 1 MLS/HR: 5; .45; .15 INJECTION INTRAVENOUS at 13:30

## 2018-09-10 RX ADMIN — DEXTROSE, SODIUM CHLORIDE, AND POTASSIUM CHLORIDE 1 MLS/HR: 5; .45; .15 INJECTION INTRAVENOUS at 03:39

## 2018-09-10 RX ADMIN — PANTOPRAZOLE SODIUM 1 MG: 40 INJECTION, POWDER, FOR SOLUTION INTRAVENOUS at 05:31

## 2018-09-11 LAB
ABNORMAL IP MESSAGE: 1
ADD MAN DIFF?: NO
ALBUMIN: 3.7 G/DL (ref 3.3–4.9)
ANION GAP: 13 (ref 8–16)
BASOPHIL #: 0 10^3/UL (ref 0–0.1)
BASOPHILS %: 0.3 % (ref 0–2)
BLOOD UREA NITROGEN: 4 MG/DL (ref 7–20)
CALCIUM: 8.6 MG/DL (ref 8.4–10.2)
CARBON DIOXIDE: 24 MMOL/L (ref 21–31)
CHLORIDE: 106 MMOL/L (ref 97–110)
CREATININE: 0.53 MG/DL (ref 0.44–1)
EOSINOPHILS #: 0 10^3/UL (ref 0–0.5)
EOSINOPHILS %: 1 % (ref 0–7)
GLUCOSE: 107 MG/DL (ref 70–220)
HEMATOCRIT: 32.6 % (ref 37–47)
HEMOGLOBIN: 10.7 G/DL (ref 12–16)
IMMATURE GRANS #M: 0.01 10^3/UL (ref 0–0.03)
IMMATURE GRANS % (M): 0.3 % (ref 0–0.43)
LYMPHOCYTES #: 0.5 10^3/UL (ref 0.8–2.9)
LYMPHOCYTES %: 14.8 % (ref 15–51)
MAGNESIUM: 1.9 MG/DL (ref 1.7–2.5)
MEAN CORPUSCULAR HEMOGLOBIN: 31.4 PG (ref 29–33)
MEAN CORPUSCULAR HGB CONC: 32.8 G/DL (ref 32–37)
MEAN CORPUSCULAR VOLUME: 95.6 FL (ref 82–101)
MEAN PLATELET VOLUME: 9.8 FL (ref 7.4–10.4)
MONOCYTE #: 0.2 10^3/UL (ref 0.3–0.9)
MONOCYTES %: 5.5 % (ref 0–11)
NEUTROPHIL #: 2.4 10^3/UL (ref 1.6–7.5)
NEUTROPHILS %: 78.1 % (ref 39–77)
NUCLEATED RED BLOOD CELLS #: 0 10^3/UL (ref 0–0)
NUCLEATED RED BLOOD CELLS%: 0 /100WBC (ref 0–0)
PHOSPHORUS: 1.7 MG/DL (ref 2.5–4.9)
PLATELET COUNT: 250 10^3/UL (ref 140–415)
POSITIVE DIFF: (no result)
POTASSIUM: 3.9 MMOL/L (ref 3.5–5.1)
RED BLOOD COUNT: 3.41 10^6/UL (ref 4.2–5.4)
RED CELL DISTRIBUTION WIDTH: 14.3 % (ref 11.5–14.5)
SODIUM: 139 MMOL/L (ref 135–144)
WHITE BLOOD COUNT: 3.1 10^3/UL (ref 4.8–10.8)

## 2018-09-11 RX ADMIN — POTASSIUM & SODIUM PHOSPHATES POWDER PACK 280-160-250 MG 1 MG: 280-160-250 PACK at 14:59

## 2018-09-11 RX ADMIN — DEXTROSE, SODIUM CHLORIDE, AND POTASSIUM CHLORIDE 1 MLS/HR: 5; .45; .15 INJECTION INTRAVENOUS at 04:39

## 2018-09-11 RX ADMIN — POTASSIUM ACETATE 1 MLS/HR: 3.93 INJECTION, SOLUTION, CONCENTRATE INTRAVENOUS at 13:46

## 2018-09-11 RX ADMIN — KETOROLAC TROMETHAMINE 1 MG: 15 INJECTION, SOLUTION INTRAMUSCULAR; INTRAVENOUS at 11:52

## 2018-09-11 RX ADMIN — PANTOPRAZOLE SODIUM 1 MG: 40 INJECTION, POWDER, FOR SOLUTION INTRAVENOUS at 05:33

## 2019-02-21 ENCOUNTER — HOSPITAL ENCOUNTER (INPATIENT)
Dept: HOSPITAL 91 - E/R | Age: 69
LOS: 5 days | Discharge: HOME | DRG: 390 | End: 2019-02-26
Payer: COMMERCIAL

## 2019-02-21 ENCOUNTER — HOSPITAL ENCOUNTER (INPATIENT)
Dept: HOSPITAL 10 - E/R | Age: 69
LOS: 5 days | Discharge: HOME | DRG: 390 | End: 2019-02-26
Attending: INTERNAL MEDICINE | Admitting: INTERNAL MEDICINE
Payer: COMMERCIAL

## 2019-02-21 VITALS — RESPIRATION RATE: 18 BRPM | HEART RATE: 71 BPM | DIASTOLIC BLOOD PRESSURE: 51 MMHG | SYSTOLIC BLOOD PRESSURE: 94 MMHG

## 2019-02-21 VITALS
WEIGHT: 139.55 LBS | BODY MASS INDEX: 26.35 KG/M2 | BODY MASS INDEX: 26.35 KG/M2 | HEIGHT: 61 IN | HEIGHT: 61 IN | WEIGHT: 139.55 LBS

## 2019-02-21 VITALS — SYSTOLIC BLOOD PRESSURE: 114 MMHG | RESPIRATION RATE: 18 BRPM | HEART RATE: 74 BPM | DIASTOLIC BLOOD PRESSURE: 62 MMHG

## 2019-02-21 VITALS — HEART RATE: 70 BPM | DIASTOLIC BLOOD PRESSURE: 65 MMHG | SYSTOLIC BLOOD PRESSURE: 112 MMHG | RESPIRATION RATE: 18 BRPM

## 2019-02-21 VITALS — RESPIRATION RATE: 18 BRPM | HEART RATE: 74 BPM | SYSTOLIC BLOOD PRESSURE: 115 MMHG | DIASTOLIC BLOOD PRESSURE: 62 MMHG

## 2019-02-21 DIAGNOSIS — D64.89: ICD-10-CM

## 2019-02-21 DIAGNOSIS — Z90.710: ICD-10-CM

## 2019-02-21 DIAGNOSIS — K56.600: Primary | ICD-10-CM

## 2019-02-21 DIAGNOSIS — M76.821: ICD-10-CM

## 2019-02-21 DIAGNOSIS — Z92.3: ICD-10-CM

## 2019-02-21 DIAGNOSIS — M81.0: ICD-10-CM

## 2019-02-21 DIAGNOSIS — Z92.21: ICD-10-CM

## 2019-02-21 DIAGNOSIS — Z85.41: ICD-10-CM

## 2019-02-21 DIAGNOSIS — M72.2: ICD-10-CM

## 2019-02-21 DIAGNOSIS — M76.71: ICD-10-CM

## 2019-02-21 DIAGNOSIS — M65.871: ICD-10-CM

## 2019-02-21 LAB
ADD MAN DIFF?: NO
ALANINE AMINOTRANSFERASE: 13 IU/L (ref 13–69)
ALBUMIN/GLOBULIN RATIO: 1.16
ALBUMIN: 4.3 G/DL (ref 3.3–4.9)
ALKALINE PHOSPHATASE: 56 IU/L (ref 42–121)
ANION GAP: 10 (ref 5–13)
ASPARTATE AMINO TRANSFERASE: 30 IU/L (ref 15–46)
BASOPHIL #: 0 10^3/UL (ref 0–0.1)
BASOPHILS %: 0.4 % (ref 0–2)
BILIRUBIN,DIRECT: 0 MG/DL (ref 0–0.2)
BILIRUBIN,TOTAL: 0.6 MG/DL (ref 0.2–1.3)
BLOOD UREA NITROGEN: 20 MG/DL (ref 7–20)
CALCIUM: 9.5 MG/DL (ref 8.4–10.2)
CARBON DIOXIDE: 27 MMOL/L (ref 21–31)
CHLORIDE: 100 MMOL/L (ref 97–110)
CREATININE: 0.51 MG/DL (ref 0.44–1)
EOSINOPHILS #: 0 10^3/UL (ref 0–0.5)
EOSINOPHILS %: 0.6 % (ref 0–7)
GLOBULIN: 3.7 G/DL (ref 1.3–3.2)
GLUCOSE: 115 MG/DL (ref 70–220)
HEMATOCRIT: 33.6 % (ref 37–47)
HEMOGLOBIN: 11.2 G/DL (ref 12–16)
IMMATURE GRANS #M: 0.01 10^3/UL (ref 0–0.03)
IMMATURE GRANS % (M): 0.2 % (ref 0–0.43)
LIPASE: 129 U/L (ref 23–300)
LYMPHOCYTES #: 0.7 10^3/UL (ref 0.8–2.9)
LYMPHOCYTES %: 14.2 % (ref 15–51)
MEAN CORPUSCULAR HEMOGLOBIN: 31.2 PG (ref 29–33)
MEAN CORPUSCULAR HGB CONC: 33.3 G/DL (ref 32–37)
MEAN CORPUSCULAR VOLUME: 93.6 FL (ref 82–101)
MEAN PLATELET VOLUME: 10 FL (ref 7.4–10.4)
MONOCYTE #: 0.3 10^3/UL (ref 0.3–0.9)
MONOCYTES %: 5.7 % (ref 0–11)
NEUTROPHIL #: 3.7 10^3/UL (ref 1.6–7.5)
NEUTROPHILS %: 78.9 % (ref 39–77)
NUCLEATED RED BLOOD CELLS #: 0 10^3/UL (ref 0–0)
NUCLEATED RED BLOOD CELLS%: 0 /100WBC (ref 0–0)
PLATELET COUNT: 268 10^3/UL (ref 140–415)
POTASSIUM: 4.2 MMOL/L (ref 3.5–5.1)
RED BLOOD COUNT: 3.59 10^6/UL (ref 4.2–5.4)
RED CELL DISTRIBUTION WIDTH: 14.4 % (ref 11.5–14.5)
SODIUM: 137 MMOL/L (ref 135–144)
TOTAL PROTEIN: 8 G/DL (ref 6.1–8.1)
URINE KETONES (DIP) POC: (no result)
URINE PH (DIP) POC: 7 (ref 5–8.5)
WHITE BLOOD COUNT: 4.7 10^3/UL (ref 4.8–10.8)

## 2019-02-21 PROCEDURE — 80053 COMPREHEN METABOLIC PANEL: CPT

## 2019-02-21 PROCEDURE — 73630 X-RAY EXAM OF FOOT: CPT

## 2019-02-21 PROCEDURE — 93970 EXTREMITY STUDY: CPT

## 2019-02-21 PROCEDURE — 81003 URINALYSIS AUTO W/O SCOPE: CPT

## 2019-02-21 PROCEDURE — 74019 RADEX ABDOMEN 2 VIEWS: CPT

## 2019-02-21 PROCEDURE — 36415 COLL VENOUS BLD VENIPUNCTURE: CPT

## 2019-02-21 PROCEDURE — 85025 COMPLETE CBC W/AUTO DIFF WBC: CPT

## 2019-02-21 PROCEDURE — 83690 ASSAY OF LIPASE: CPT

## 2019-02-21 PROCEDURE — 96374 THER/PROPH/DIAG INJ IV PUSH: CPT

## 2019-02-21 PROCEDURE — 84560 ASSAY OF URINE/URIC ACID: CPT

## 2019-02-21 PROCEDURE — 96375 TX/PRO/DX INJ NEW DRUG ADDON: CPT

## 2019-02-21 PROCEDURE — 73718 MRI LOWER EXTREMITY W/O DYE: CPT

## 2019-02-21 PROCEDURE — 71045 X-RAY EXAM CHEST 1 VIEW: CPT

## 2019-02-21 PROCEDURE — 97161 PT EVAL LOW COMPLEX 20 MIN: CPT

## 2019-02-21 PROCEDURE — 73721 MRI JNT OF LWR EXTRE W/O DYE: CPT

## 2019-02-21 PROCEDURE — 83735 ASSAY OF MAGNESIUM: CPT

## 2019-02-21 PROCEDURE — 74176 CT ABD & PELVIS W/O CONTRAST: CPT

## 2019-02-21 PROCEDURE — 99285 EMERGENCY DEPT VISIT HI MDM: CPT

## 2019-02-21 PROCEDURE — 83036 HEMOGLOBIN GLYCOSYLATED A1C: CPT

## 2019-02-21 PROCEDURE — 73610 X-RAY EXAM OF ANKLE: CPT

## 2019-02-21 PROCEDURE — 80048 BASIC METABOLIC PNL TOTAL CA: CPT

## 2019-02-21 RX ADMIN — ONDANSETRON HYDROCHLORIDE 1 MG: 2 INJECTION, SOLUTION INTRAMUSCULAR; INTRAVENOUS at 03:30

## 2019-02-21 RX ADMIN — PIPERACILLIN SODIUM AND TAZOBACTAM SODIUM 1 MLS/HR: 3; .375 INJECTION, POWDER, LYOPHILIZED, FOR SOLUTION INTRAVENOUS at 04:16

## 2019-02-21 RX ADMIN — ASCORBIC ACID, VITAMIN A PALMITATE, CHOLECALCIFEROL, THIAMINE HYDROCHLORIDE, RIBOFLAVIN-5 PHOSPHATE SODIUM, PYRIDOXINE HYDROCHLORIDE, NIACINAMIDE, DEXPANTHENOL, ALPHA-TOCOPHEROL ACETATE, VITAMIN K1, FOLIC ACID, BIOTIN, CYANOCOBALAMIN 1 MLS/HR: 200; 3300; 200; 6; 3.6; 6; 40; 15; 10; 150; 600; 60; 5 INJECTION, SOLUTION INTRAVENOUS at 14:58

## 2019-02-21 RX ADMIN — ENOXAPARIN SODIUM 1 MG: 100 INJECTION SUBCUTANEOUS at 09:16

## 2019-02-21 RX ADMIN — FOLIC ACID SCH MLS/HR: 5 INJECTION, SOLUTION INTRAMUSCULAR; INTRAVENOUS; SUBCUTANEOUS at 14:58

## 2019-02-21 RX ADMIN — ASCORBIC ACID, VITAMIN A PALMITATE, CHOLECALCIFEROL, THIAMINE HYDROCHLORIDE, RIBOFLAVIN-5 PHOSPHATE SODIUM, PYRIDOXINE HYDROCHLORIDE, NIACINAMIDE, DEXPANTHENOL, ALPHA-TOCOPHEROL ACETATE, VITAMIN K1, FOLIC ACID, BIOTIN, CYANOCOBALAMIN 1 MLS/HR: 200; 3300; 200; 6; 3.6; 6; 40; 15; 10; 150; 600; 60; 5 INJECTION, SOLUTION INTRAVENOUS at 05:17

## 2019-02-21 RX ADMIN — MORPHINE SULFATE 1 MG: 2 INJECTION, SOLUTION INTRAMUSCULAR; INTRAVENOUS at 03:31

## 2019-02-21 RX ADMIN — FOLIC ACID SCH MLS/HR: 5 INJECTION, SOLUTION INTRAMUSCULAR; INTRAVENOUS; SUBCUTANEOUS at 05:17

## 2019-02-21 RX ADMIN — ENOXAPARIN SODIUM SCH MG: 100 INJECTION SUBCUTANEOUS at 09:16

## 2019-02-21 RX ADMIN — HYDROMORPHONE HYDROCHLORIDE 1 MG: 1 INJECTION, SOLUTION INTRAMUSCULAR; INTRAVENOUS; SUBCUTANEOUS at 18:59

## 2019-02-21 RX ADMIN — HYDROMORPHONE HYDROCHLORIDE PRN MG: 1 INJECTION, SOLUTION INTRAMUSCULAR; INTRAVENOUS; SUBCUTANEOUS at 18:59

## 2019-02-21 NOTE — CONS
Assessment/Plan


Assessment/Plan


Assessment/Plan (Daily)


Small bowel obstruction, persistent


Patient failed clear liquid trial today.


Will place NG tube


CT of abdomen and pelvis with oral contrast tomorrow





Consultation Date/Type/Reason


Admit Date/Time





Date/Time of Note


DATE: 19 


TIME: 21:07





Hx of Present Illness


The patient is a 68-year-old female with multiple small bowel obstructions in 


the past.  She presented to the ER today with abdominal pain.  Had nausea as 


well.  Symptoms were similar to her prior bowel obstructions in the past.  She 


has not had surgery many of the small bowel obstructions.  They have all 


resolved spontaneously.  States that she has had 7 abdominal surgeries prior





Past Medical History


Medical History:  other (Recurrent small bowel obstruction)


Home Meds


Reported Medications


Omega-3 Fatty Acids/Fish Oil (Fish Oil 1,000 mg Capsule) 1 Each Capsule, 1 EACH 


PO DAILY, CAP


   19


Multivitamins* (Theragran*) 1 Tab Tab, 1 TAB PO DAILY, TAB


   18


Discontinued Reported Medications


Glucosamine Sulfate 2KCL (GLUCOSAMINE) 1,000 Mg Tablet, 1000 MG PO DAILY, TAB


   19


Alendronate Sodium* (Fosamax*) 70 Mg Tablet, 70 MG PO QFRIDAY, #4 TAB


   18


Docusate Sodium* (Docusate Sodium*) 100 Mg Capsule, 100 MG PO BID, #60 CAP


   18


Medications





Current Medications


Dextrose/Sodium Chloride 1,000 ml @  100 mls/hr Q10H IV  Last administered on 


19at 14:58; Admin Dose 100 MLS/HR;  Start 19 at 05:09


IV Flush (NS 3 ml) 3 ml PER PROTOCOL IV ;  Start 19 at 05:30


Hydromorphone HCl (Dilaudid) 0.5 mg Q4H  PRN IV .SEVERE PAIN 7-10 Last 


administered on 19at 18:59; Admin Dose 0.5 MG;  Start 19 at 05:30


Enoxaparin Sodium (Lovenox) 30 mg DAILY SC  Last administered on 19at 


09:16; Admin Dose 30 MG;  Start 19 at 09:00


Allergies:  


Coded Allergies:  


     No Known Allergies (Verified  Allergy, Mild, 19)





Past Surgical History


Past Surgical Hx:  appendectomy, other (Multiple multiple abdominal surgeries)





Social History


Alcohol Use:  none


Smoking Status:  Never smoker


Drug Use:  none





Exam/Review of Systems


Exam


Vitals





Vital Signs


  Date      Temp  Pulse  Resp  B/P (MAP)   Pulse Ox  O2          O2 Flow    FiO2


Time                                                 Delivery    Rate


   19  98.3     71    18  94/51 (65)        94  Room Air


     20:00





Constitutional:  alert, oriented, well developed


Psych:  no complaints


Head:  normocephalic


Eyes:  nl conjunctiva


ENMT:  nl external ears & nose


Neck:  supple, non-tender


Respiratory:  clear to auscultation, normal air movement


Cardiovascular:  regular rate and rhythm, nl pulses


Gastrointestinal:  soft, other (, Nondistended, some left upper quadrant 


tenderness)


Musculoskeletal:  nl extremities to inspection


Extremities:  normal pulses


Neurological:  CNS II-XII intact, nl mental status, nl speech


Skin:  nl turgor





Results


Result Diagram:  


19 0320                                                                    


           19 0320





Results 24hrs





Laboratory Tests


       Test
                                19
03:20   19
04:00


       White Blood Count                          4.7  #L


       Red Blood Count                            3.59  L


       Hemoglobin                                 11.2  L


       Hematocrit                                 33.6  L


       Mean Corpuscular Volume                     93.6


       Mean Corpuscular Hemoglobin                 31.2


       Mean Corpuscular Hemoglobin
Concent        33.3  
  



       Red Cell Distribution Width                 14.4


       Platelet Count                               268


       Mean Platelet Volume                        10.0


       Immature Granulocytes %                    0.200


       Neutrophils %                              78.9  H


       Lymphocytes %                              14.2  L


       Monocytes %                                  5.7


       Eosinophils %                                0.6


       Basophils %                                  0.4


       Nucleated Red Blood Cells %                  0.0


       Immature Granulocytes #                    0.010


       Neutrophils #                                3.7


       Lymphocytes #                               0.7  L


       Monocytes #                                  0.3


       Eosinophils #                                0.0


       Basophils #                                  0.0


       Nucleated Red Blood Cells #                  0.0


       Sodium Level                                 137


       Potassium Level                              4.2


       Chloride Level                               100


       Carbon Dioxide Level                          27


       Anion Gap                                     10


       Blood Urea Nitrogen                           20


       Creatinine                                  0.51


       Est Glomerular Filtrat Rate
mL/min   > 60  
        



       Glucose Level                                115


       Calcium Level                                9.5


       Total Bilirubin                              0.6


       Direct Bilirubin                            0.00


       Indirect Bilirubin                           0.6


       Aspartate Amino Transf
(AST/SGOT)            30  
  



       Alanine Aminotransferase
(ALT/SGPT)          13  
  



       Alkaline Phosphatase                          56


       Total Protein                                8.0


       Albumin                                      4.3


       Globulin                                   3.70  H


       Albumin/Globulin Ratio                      1.16


       Lipase                                       129


       Bedside Urine pH (LAB)                                       7.0


       Bedside Urine Protein (LAB)                         Negative


       Bedside Urine Glucose (UA)                          Negative


       Bedside Urine Ketones (LAB)                                  3+  H


       Bedside Urine Blood                                 Trace-lysed  H


       Bedside Urine Nitrite (LAB)                         Negative


       Bedside Urine Leukocyte
Esterase (L  
              Negative  









Imaging


Imaging


 Patient: AMINA RAND   : 1950   Age: 68  Sex: F                        


       MR #:    [id number]   Acct #:   [id number]    DOS: 19 0251


Ordering MD: ROBERT MCLEOD MD   Location:  E/R   Room/Bed:                    


                       


                                        


PROCEDURE:   CT Abdomen and pelvis without contrast. 


 


CLINICAL INDICATION:   Abdominal pain


 


TECHNIQUE:   CT scan of the abdomen and pelvis without contrast was performed on


a multidetector high-resolution CT scan.  .  Coronal and sagittal reformatted 


images were obtained from the axial source images. Standard CT scan of the 


abdomen pelvis without contrast protocols were performed.  


 


The total exam CTDI equals 7.3 mGy and the total exam DLP equals 429.79 mGy-cm. 


 


One or more of the following dose reduction techniques were used:


- Automated exposure control.


- Adjustment of the mA and/or kV according to patient size.


Use of iterative reconstruction technique.


Dicom images are available


 


COMPARISON:   CT abdomen pelvis 2018 


 


FINDINGS:   


 


Again noted is previous abdominal surgery with surgical clips right abdomen and 


right and left lower quadrants of the abdomen and along the retroperitoneum.


 


Again noted are dilated loops of small bowel in the left abdomen and central 


lower abdomen with the more normal-caliber proximal small bowel and distal small


bowel consistent with partial closed loop obstruction. Stomach unremarkable. 


Diverticular changes of the sigmoid colon but no CT evidence of diverticulitis. 


Remainder the colon is unremarkable. No evidence of appendicitis.


 


No evidence of intra-abdominal free air, free fluid, abscesses or 


lymphadenopathy.


 


Kidneys are normal in size without intra renal masses or calculi bilaterally. 


Mild prominent renal pelves but no definite hydronephrosis bilaterally. 


Prominent proximal left ureter. Ureters are otherwise unremarkable. Partially 


contracted otherwise unremarkable urinary bladder.


 


Status post previous hysterectomy. No adnexal masses.


 


Hepatic fatty infiltration with small focal fatty sparing subcapsular right lobe


of the liver adjacent to the ligamentum teres. No evidence of hepatic masses.


 


Spleen pancreas and adrenal glands unremarkable.


 


Cholelithiasis with an otherwise unremarkable distended gallbladder. No evidence


biliary ductal dilation.


 


Bibasilar subsegmental atelectasis and chronic interstitial lung disease. 


Atherosclerosis of the aorta but no evidence of aneurysm.


 


Again noted is fat containing periumbilical hernia with mild edema. Remainder of


the abdominal pelvic wall unremarkable.


 


Chronic right parasymphyseal and L5 vertebral body fractures. No acute osseous 


findings. Degenerative changes of the lower thoracic and lumbar spine. 


Nonaggressive mixed lucent sclerotic lesion involving the medial right ilium. No


acute osseous findings are osteoblastic/osteolytic lesions.


 


IMPRESSION: 


1.  Again noted are dilated loops of small bowel in the left abdomen and central


lower abdomen with more normal caliber proximal and distal small bowel 


consistent with partial closed loop obstruction.


2.  Diverticulosis sigmoid colon without CT evidence diverticulitis. No evidence


of appendicitis.


3.  Evidence of previous abdominal surgery. Status post hysterectomy. 


4.  Cholelithiasis with an otherwise unremarkable distended gallbladder. No 


biliary ductal dilation.


5.  No evidence of calcified urinary calculi or obstructive uropathy.


6.  Hepatic fatty infiltration.


 


 


RPTAT:AAJJ


_____________________________________________ 


Physician Lauren           Date    Time 


Electronically viewed and signed by Physician Lauren on 2019 03:53 


 


D:  2019 03:53  T:  2019 03:53


BM/





Medications


Medication





Current Medications


Dextrose/Sodium Chloride 1,000 ml @  100 mls/hr Q10H IV  Last administered on 


19at 14:58; Admin Dose 100 MLS/HR;  Start 19 at 05:09


IV Flush (NS 3 ml) 3 ml PER PROTOCOL IV ;  Start 19 at 05:30


Hydromorphone HCl (Dilaudid) 0.5 mg Q4H  PRN IV .SEVERE PAIN 7-10 Last 


administered on 19at 18:59; Admin Dose 0.5 MG;  Start 19 at 05:30


Enoxaparin Sodium (Lovenox) 30 mg DAILY SC  Last administered on 19at 


09:16; Admin Dose 30 MG;  Start 19 at 09:00











AYESHA SHEARER MD           2019 21:09

## 2019-02-21 NOTE — QN
Documentation


Comment


46-year-old female with history of abdominal surgery, admitted with partial 


small bowel obstruction.





Patient admits passing flatus.  She did not have a bowel movement today.  Her 


pain is improved.  She is not having any vomiting or nausea at this time.  Dr. Arevalo has been consulted and advised me to start patient on a clear diet and 


will see patient later, deferred further imaging.





Case discussed with TRISH Smith NP               Feb 21, 2019 10:32

## 2019-02-21 NOTE — ERD
ER Documentation


Chief Complaint


Chief Complaint





abdominal pain





HPI


The patient is a 68-year-old female, presenting to the ER because of diffuse 


abdominal pain for the last 2 days, nausea, had similar symptom previously from 


small bowel obstruction, denies, chills, neck pain, chest pain, vomiting, dizzy,


diarrhea, constipation.  She does not smoke nor drink





Past medical history: multiple small bowel obstruction


Surgical history: Hysterectomy, urinary bladder suspension, appendectomy





ROS


All systems reviewed and are negative except as per history of present illness.





Medications


Home Meds


Reported Medications


Glucosamine Sulfate 2KCL (GLUCOSAMINE) 1,000 Mg Tablet, 1000 MG PO DAILY, TAB


   19


Omega-3 Fatty Acids/Fish Oil (Fish Oil 1,000 mg Capsule) 1 Each Capsule, 1 EACH 


PO DAILY, CAP


   19


Multivitamins* (Theragran*) 1 Tab Tab, 1 TAB PO DAILY, TAB


   18


Discontinued Reported Medications


Alendronate Sodium* (Fosamax*) 70 Mg Tablet, 70 MG PO QFRIDAY, #4 TAB


   18


Docusate Sodium* (Docusate Sodium*) 100 Mg Capsule, 100 MG PO BID, #60 CAP


   18





Allergies


Allergies:  


Coded Allergies:  


     No Known Allergies (Verified  Allergy, Mild, 19)





PMhx/Soc


History of Surgery:  Yes (hysterectomy)


Anesthesia Reaction:  No


Hx Neurological Disorder:  No


Hx Respiratory Disorders:  No


Hx Cardiac Disorders:  No


Hx Psychiatric Problems:  No


Hx Miscellaneous Medical Probl:  No


Hx Alcohol Use:  No


Hx Substance Use:  No


Hx Tobacco Use:  No





Physical Exam


Vitals





Vital Signs


  Date      Temp  Pulse  Resp  B/P (MAP)   Pulse Ox  O2          O2 Flow    FiO2


Time                                                 Delivery    Rate


   19           73    13      121/88        97  Room Air


     04:03                           (99)


   19  99.4     89    20      159/63        99


     02:26                           (95)





Physical Exam


 Const:      No acute distress.


 Head:        Atraumatic.


 Eyes:       Normal Conjunctiva.


 ENT:         Normal External Ears, Nose and Mouth.


 Neck:        Full range of motion.  No meningismus.


 Resp:         Clear to auscultation bilaterally.


 Cardio:       Regular rate and rhythm.


 Abd:         Soft,  non distended, normal bowel sounds, diffuse vague and mild 


abdominal tenderness, no rigidity/rebound/CVA tenderness


 Skin:         No petechiae or rashes.


 Back:        No midline or flank tenderness.


 Ext:          No cyanosis, or edema.


 Neur:        Awake and alert. No focal deficit


 Psych:        Normal Mood and Affect.


Result Diagram:  


19 0320                                                                    


           19 0320





Results 24 hrs





Laboratory Tests


       Test
                                 19
03:20  19
04:00


       White Blood Count                      4.7 10^3/ul


       Red Blood Count                       3.59 10^6/ul


       Hemoglobin                               11.2 g/dl


       Hematocrit                                  33.6 %


       Mean Corpuscular Volume                    93.6 fl


       Mean Corpuscular Hemoglobin                31.2 pg


       Mean Corpuscular Hemoglobin
Concent     33.3 g/dl 
  



       Red Cell Distribution Width                 14.4 %


       Platelet Count                         268 10^3/UL


       Mean Platelet Volume                       10.0 fl


       Immature Granulocytes %                    0.200 %


       Neutrophils %                               78.9 %


       Lymphocytes %                               14.2 %


       Monocytes %                                  5.7 %


       Eosinophils %                                0.6 %


       Basophils %                                  0.4 %


       Nucleated Red Blood Cells %            0.0 /100WBC


       Immature Granulocytes #              0.010 10^3/ul


       Neutrophils #                          3.7 10^3/ul


       Lymphocytes #                          0.7 10^3/ul


       Monocytes #                            0.3 10^3/ul


       Eosinophils #                          0.0 10^3/ul


       Basophils #                            0.0 10^3/ul


       Nucleated Red Blood Cells #            0.0 10^3/ul


       Sodium Level                            137 mmol/L


       Potassium Level                         4.2 mmol/L


       Chloride Level                          100 mmol/L


       Carbon Dioxide Level                     27 mmol/L


       Anion Gap                                       10


       Blood Urea Nitrogen                       20 mg/dl


       Creatinine                              0.51 mg/dl


       Est Glomerular Filtrat Rate
mL/min   > 60 mL/min 
   



       Glucose Level                            115 mg/dl


       Calcium Level                            9.5 mg/dl


       Total Bilirubin                          0.6 mg/dl


       Direct Bilirubin                        0.00 mg/dl


       Indirect Bilirubin                       0.6 mg/dl


       Aspartate Amino Transf
(AST/SGOT)         30 IU/L 
  



       Alanine Aminotransferase
(ALT/SGPT)       13 IU/L 
  



       Alkaline Phosphatase                       56 IU/L


       Total Protein                             8.0 g/dl


       Albumin                                   4.3 g/dl


       Globulin                                 3.70 g/dl


       Albumin/Globulin Ratio                        1.16


       Lipase                                     129 U/L


       Bedside Urine pH (LAB)                                        7.0


       Bedside Urine Protein (LAB)                          Negative


       Bedside Urine Glucose (UA)                           Negative


       Bedside Urine Ketones (LAB)                                    3+


       Bedside Urine Blood                                  Trace-lysed


       Bedside Urine Nitrite (LAB)                          Negative


       Bedside Urine Leukocyte
Esterase (L  
               Negative 






Current Medications


 Medications
   Dose
          Sig/Stephanie
       Start Time
   Status  Last


 (Trade)       Ordered        Route
 PRN     Stop Time              Admin
Dose


                              Reason                                Admin


 Morphine       2 mg           ONCE  STAT
    19       DC           19


Sulfate
                      IV
            02:51
                       03:31



(morphine)                                   19 02:53


 Ondansetron    4 mg           ONCE  STAT
    19       DC           19


HCl
  (Zofran                 IV
            02:51
                       03:30



Inj)                                         19 02:53


 Piperacillin   100 ml @ 
     ONCE  ONCE
    19


Sod/
          200 mls/hr     IVPB
          04:30
                       04:16



Tazobactam                                   19 04:59


Sod








Procedures/MDM


                          [place] [address]


                        Radiology Main Line: [phone]





                            DIAGNOSTIC IMAGING REPORT





 Patient: AMINA RAND   : 1950   Age: 68  Sex: F                        


       MR #:    [id number]   Acct #:   [id number]    DOS: 19 025


Ordering MD: ROBERT MCLEOD MD   Location:  E/R   Room/Bed:                    


                       


                                        


PROCEDURE:   CT Abdomen and pelvis without contrast. 


 


CLINICAL INDICATION:   Abdominal pain


 


TECHNIQUE:   CT scan of the abdomen and pelvis without contrast was performed on


a multidetector high-resolution CT scan.  .  Coronal and sagittal reformatted 


images were obtained from the axial source images. Standard CT scan of the 


abdomen pelvis without contrast protocols were performed.  


 


The total exam CTDI equals 7.3 mGy and the total exam DLP equals 429.79 mGy-cm. 


 


One or more of the following dose reduction techniques were used:


- Automated exposure control.


- Adjustment of the mA and/or kV according to patient size.


Use of iterative reconstruction technique.


Dicom images are available


 


COMPARISON:   CT abdomen pelvis 2018 


 


FINDINGS:   


 


Again noted is previous abdominal surgery with surgical clips right abdomen and 


right and left lower quadrants of the abdomen and along the retroperitoneum.


 


Again noted are dilated loops of small bowel in the left abdomen and central 


lower abdomen with the more normal-caliber proximal small bowel and distal small


bowel consistent with partial closed loop obstruction. Stomach unremarkable. 


Diverticular changes of the sigmoid colon but no CT evidence of diverticulitis. 


Remainder the colon is unremarkable. No evidence of appendicitis.


 


No evidence of intra-abdominal free air, free fluid, abscesses or 


lymphadenopathy.


 


Kidneys are normal in size without intra renal masses or calculi bilaterally. 


Mild prominent renal pelves but no definite hydronephrosis bilaterally. 


Prominent proximal left ureter. Ureters are otherwise unremarkable. Partially 


contracted otherwise unremarkable urinary bladder.


 


Status post previous hysterectomy. No adnexal masses.


 


Hepatic fatty infiltration with small focal fatty sparing subcapsular right lobe


of the liver adjacent to the ligamentum teres. No evidence of hepatic masses.


 


Spleen pancreas and adrenal glands unremarkable.


 


Cholelithiasis with an otherwise unremarkable distended gallbladder. No evidence


biliary ductal dilation.


 


Bibasilar subsegmental atelectasis and chronic interstitial lung disease. 


Atherosclerosis of the aorta but no evidence of aneurysm.


 


Again noted is fat containing periumbilical hernia with mild edema. Remainder of


the abdominal pelvic wall unremarkable.


 


Chronic right parasymphyseal and L5 vertebral body fractures. No acute osseous 


findings. Degenerative changes of the lower thoracic and lumbar spine. 


Nonaggressive mixed lucent sclerotic lesion involving the medial right ilium. No


acute osseous findings are osteoblastic/osteolytic lesions.


 


IMPRESSION: 


1.  Again noted are dilated loops of small bowel in the left abdomen and central


lower abdomen with more normal caliber proximal and distal small bowel 


consistent with partial closed loop obstruction.


2.  Diverticulosis sigmoid colon without CT evidence diverticulitis. No evidence


of appendicitis.


3.  Evidence of previous abdominal surgery. Status post hysterectomy. 


4.  Cholelithiasis with an otherwise unremarkable distended gallbladder. No 


biliary ductal dilation.


5.  No evidence of calcified urinary calculi or obstructive uropathy.


6.  Hepatic fatty infiltration.


 


 


RPTAT:AAJJ


_____________________________________________ 


Physician Lauren           Date    Time 


Electronically viewed and signed by Physician Lauren on 2019 03:53 


 


D:  2019 03:53  T:  2019 03:53


BM/





CC: ROBERT MCLEOD MD





[phone]





Consultation: I discussed the patient with the on-call general surgeon Dr. Arevalo at 4 AM, who was made aware of the lab, the treatment, the patient 


condition and he accepted the consult





MEDICAL MAKING DECISION: The patient is a 68-year-old female, presenting with 


acute partial small bowel obstruction, cholelithiasis.  She was treated with 


morphine 2 mg IV for pain, Zofran for medical IV for nausea, Zosyn IV for acute 


partial small bowel obstruction





The differential diagnoses considered include but are not limited to cholelithia


sis, cholecystitis,  choledocholithiasis, cholangitis, pancreatitis, hepatitis, 


gastritis, peptic ulcer disease, gastric ulcer,  cystitis,  diverticulitis, 


partial small bowel obstruction.





Departure


Diagnosis:  


   Primary Impression:  


   Partial small bowel obstruction


   Additional Impressions:  


   Cholelithiasis


   Leukopenia


   Anemia


Condition:  Stable


Comments


I discussed the findings with the patient. I discussed the patient with the 


hospitalist Dr Price at 4:25 am .  who was made aware of the lab, the 


treatment, the patient condition. The patient is admitted to [facility]





Disclaimer: Inadvertent spelling and grammatical errors are likely due to 


EHR/dictation software use and do not reflect on the overall quality of patient 


care. Also, please note that the electronic time recorded on this note does not 


necessarily reflect the actual time of the patient encounter.











ROBERT MCLEOD MD              2019 02:45

## 2019-02-21 NOTE — HP
Date/Time of Note


Date/Time of Note


DATE: 2/21/19 


TIME: 05:03





Assessment/Plan


VTE Prophylaxis


Pharmacological prophylaxis:  heparin





Lines/Catheters


IV Catheter Type (from Nrsg):  Saline Lock





Assessment/Plan


Hospital Course


67 yo female with h/o cervical cancer s/p hysterectomy with surgical adhesions 


leading to numerous episodes of SBO.  She presents again today with SBO


- I have offered NG tube but the patient declines this, she is quite familiar w


ith this and does not want one at this time.


- IV fluids


- Pain control


- Dr Arevalo consulted





H/o cervical cancer





Osteoporosis:


- Cont bisphosphonate when stable





Discharge when bowel function normalized


Result Diagram:  


2/21/19 0320                                                                    


           2/21/19 0320





Results 24hrs





Laboratory Tests


       Test
                                2/21/19
03:20   2/21/19
04:00


       White Blood Count                          4.7  #L


       Red Blood Count                            3.59  L


       Hemoglobin                                 11.2  L


       Hematocrit                                 33.6  L


       Mean Corpuscular Volume                     93.6


       Mean Corpuscular Hemoglobin                 31.2


       Mean Corpuscular Hemoglobin
Concent        33.3  
  



       Red Cell Distribution Width                 14.4


       Platelet Count                               268


       Mean Platelet Volume                        10.0


       Immature Granulocytes %                    0.200


       Neutrophils %                              78.9  H


       Lymphocytes %                              14.2  L


       Monocytes %                                  5.7


       Eosinophils %                                0.6


       Basophils %                                  0.4


       Nucleated Red Blood Cells %                  0.0


       Immature Granulocytes #                    0.010


       Neutrophils #                                3.7


       Lymphocytes #                               0.7  L


       Monocytes #                                  0.3


       Eosinophils #                                0.0


       Basophils #                                  0.0


       Nucleated Red Blood Cells #                  0.0


       Sodium Level                                 137


       Potassium Level                              4.2


       Chloride Level                               100


       Carbon Dioxide Level                          27


       Anion Gap                                     10


       Blood Urea Nitrogen                           20


       Creatinine                                  0.51


       Est Glomerular Filtrat Rate
mL/min   > 60  
        



       Glucose Level                                115


       Calcium Level                                9.5


       Total Bilirubin                              0.6


       Direct Bilirubin                            0.00


       Indirect Bilirubin                           0.6


       Aspartate Amino Transf
(AST/SGOT)            30  
  



       Alanine Aminotransferase
(ALT/SGPT)          13  
  



       Alkaline Phosphatase                          56


       Total Protein                                8.0


       Albumin                                      4.3


       Globulin                                   3.70  H


       Albumin/Globulin Ratio                      1.16


       Lipase                                       129


       Bedside Urine pH (LAB)                                       7.0


       Bedside Urine Protein (LAB)                         Negative


       Bedside Urine Glucose (UA)                          Negative


       Bedside Urine Ketones (LAB)                                  3+  H


       Bedside Urine Blood                                 Trace-lysed  H


       Bedside Urine Nitrite (LAB)                         Negative


       Bedside Urine Leukocyte
Esterase (L  
              Negative  









HPI/ROS


Admit Date/Time


Admit Date/Time








Hx of Present Illness


67 yo female with abdominal pain





Patient with h/o cervical carcinoma which was treated with hysterectomy, 


chemotherapy and radiation therapy in 2010.  Her abdominal surgeries have cause 


adhesions leading to about ten hospitalizations for SBO.  She has always been 


treated conservatively for this.  Today she presents with similar symptoms to 


prior episodes with abdominal pain and distension.  Still tolerating PO.  Does 


say she is still passing flatus.  However CT in ED shows SBO.  She has received 


IV analgesics and feels better.  I have offered NG tube decompression but she 


declines, having had much experience with NG tubes in the past.





PMH/Family/Social


Past Medical History


SBO


Cervical cancer


Coded Allergies:  


     No Known Allergies (Verified  Allergy, Mild, 2/21/19)





Past Surgical History


Hysterectomy


Past Surgical Hx:  appendectomy, other





Family History


Significant Family History:  no pertinent family hx





Social History


Alcohol Use:  none


Smoking Status:  Never smoker


Drug Use:  none





Exam/Review of Systems


Vital Signs


Vitals





Vital Signs


  Date      Temp  Pulse  Resp  B/P (MAP)   Pulse Ox  O2          O2 Flow    FiO2


Time                                                 Delivery    Rate


   2/21/19           73    13      121/88        97  Room Air


     04:03                           (99)


   2/21/19  99.4


     02:26








Exam


Exam


AOx3


Resting comfortably in NAD


RRR


CTAB


Abdomen tender to palpation, mildly distended.  No guarding or rebound











MILGROMMERCEDES MD          Feb 21, 2019 05:09

## 2019-02-22 VITALS — SYSTOLIC BLOOD PRESSURE: 92 MMHG | HEART RATE: 75 BPM | RESPIRATION RATE: 18 BRPM | DIASTOLIC BLOOD PRESSURE: 55 MMHG

## 2019-02-22 VITALS — HEART RATE: 72 BPM | SYSTOLIC BLOOD PRESSURE: 103 MMHG | RESPIRATION RATE: 18 BRPM | DIASTOLIC BLOOD PRESSURE: 60 MMHG

## 2019-02-22 VITALS — HEART RATE: 69 BPM | SYSTOLIC BLOOD PRESSURE: 113 MMHG | RESPIRATION RATE: 18 BRPM | DIASTOLIC BLOOD PRESSURE: 71 MMHG

## 2019-02-22 VITALS — HEART RATE: 65 BPM | RESPIRATION RATE: 18 BRPM | DIASTOLIC BLOOD PRESSURE: 50 MMHG | SYSTOLIC BLOOD PRESSURE: 102 MMHG

## 2019-02-22 LAB
ADD MAN DIFF?: NO
ALANINE AMINOTRANSFERASE: 14 IU/L (ref 13–69)
ALBUMIN/GLOBULIN RATIO: 1.12
ALBUMIN: 3.5 G/DL (ref 3.3–4.9)
ALKALINE PHOSPHATASE: 46 IU/L (ref 42–121)
ANION GAP: 5 (ref 5–13)
ASPARTATE AMINO TRANSFERASE: 21 IU/L (ref 15–46)
BASOPHIL #: 0 10^3/UL (ref 0–0.1)
BASOPHILS %: 0.3 % (ref 0–2)
BILIRUBIN,DIRECT: 0 MG/DL (ref 0–0.2)
BILIRUBIN,TOTAL: 0.6 MG/DL (ref 0.2–1.3)
BLOOD UREA NITROGEN: 9 MG/DL (ref 7–20)
CALCIUM: 8.7 MG/DL (ref 8.4–10.2)
CARBON DIOXIDE: 27 MMOL/L (ref 21–31)
CHLORIDE: 103 MMOL/L (ref 97–110)
CREATININE: 0.56 MG/DL (ref 0.44–1)
EOSINOPHILS #: 0 10^3/UL (ref 0–0.5)
EOSINOPHILS %: 1.1 % (ref 0–7)
GLOBULIN: 3.1 G/DL (ref 1.3–3.2)
GLUCOSE: 119 MG/DL (ref 70–220)
HEMATOCRIT: 30.6 % (ref 37–47)
HEMOGLOBIN A1C: 5.5 % (ref 0–5.9)
HEMOGLOBIN: 10.1 G/DL (ref 12–16)
IMMATURE GRANS #M: 0.01 10^3/UL (ref 0–0.03)
IMMATURE GRANS % (M): 0.3 % (ref 0–0.43)
LYMPHOCYTES #: 0.8 10^3/UL (ref 0.8–2.9)
LYMPHOCYTES %: 20.1 % (ref 15–51)
MEAN CORPUSCULAR HEMOGLOBIN: 31 PG (ref 29–33)
MEAN CORPUSCULAR HGB CONC: 33 G/DL (ref 32–37)
MEAN CORPUSCULAR VOLUME: 93.9 FL (ref 82–101)
MEAN PLATELET VOLUME: 9.9 FL (ref 7.4–10.4)
MONOCYTE #: 0.3 10^3/UL (ref 0.3–0.9)
MONOCYTES %: 8.7 % (ref 0–11)
NEUTROPHIL #: 2.6 10^3/UL (ref 1.6–7.5)
NEUTROPHILS %: 69.5 % (ref 39–77)
NUCLEATED RED BLOOD CELLS #: 0 10^3/UL (ref 0–0)
NUCLEATED RED BLOOD CELLS%: 0 /100WBC (ref 0–0)
PLATELET COUNT: 234 10^3/UL (ref 140–415)
POTASSIUM: 3.5 MMOL/L (ref 3.5–5.1)
RED BLOOD COUNT: 3.26 10^6/UL (ref 4.2–5.4)
RED CELL DISTRIBUTION WIDTH: 14.2 % (ref 11.5–14.5)
SODIUM: 135 MMOL/L (ref 135–144)
TOTAL PROTEIN: 6.6 G/DL (ref 6.1–8.1)
WHITE BLOOD COUNT: 3.8 10^3/UL (ref 4.8–10.8)

## 2019-02-22 RX ADMIN — IOHEXOL 1 ML: 300 INJECTION, SOLUTION INTRAVENOUS at 10:29

## 2019-02-22 RX ADMIN — ONDANSETRON HYDROCHLORIDE 1 MG: 2 INJECTION, SOLUTION INTRAMUSCULAR; INTRAVENOUS at 21:44

## 2019-02-22 RX ADMIN — FOLIC ACID SCH MLS/HR: 5 INJECTION, SOLUTION INTRAMUSCULAR; INTRAVENOUS; SUBCUTANEOUS at 02:51

## 2019-02-22 RX ADMIN — IOHEXOL 1 ML: 300 INJECTION, SOLUTION INTRAVENOUS at 03:42

## 2019-02-22 RX ADMIN — ENOXAPARIN SODIUM SCH MG: 100 INJECTION SUBCUTANEOUS at 08:41

## 2019-02-22 RX ADMIN — DEXAMETHASONE SODIUM PHOSPHATE PRN MG: 10 INJECTION, SOLUTION INTRAMUSCULAR; INTRAVENOUS at 21:44

## 2019-02-22 RX ADMIN — ONDANSETRON HYDROCHLORIDE 1 MG: 2 INJECTION, SOLUTION INTRAMUSCULAR; INTRAVENOUS at 12:09

## 2019-02-22 RX ADMIN — HYDROMORPHONE HYDROCHLORIDE 1 MG: 1 INJECTION, SOLUTION INTRAMUSCULAR; INTRAVENOUS; SUBCUTANEOUS at 02:55

## 2019-02-22 RX ADMIN — ASCORBIC ACID, VITAMIN A PALMITATE, CHOLECALCIFEROL, THIAMINE HYDROCHLORIDE, RIBOFLAVIN-5 PHOSPHATE SODIUM, PYRIDOXINE HYDROCHLORIDE, NIACINAMIDE, DEXPANTHENOL, ALPHA-TOCOPHEROL ACETATE, VITAMIN K1, FOLIC ACID, BIOTIN, CYANOCOBALAMIN 1 MLS/HR: 200; 3300; 200; 6; 3.6; 6; 40; 15; 10; 150; 600; 60; 5 INJECTION, SOLUTION INTRAVENOUS at 13:43

## 2019-02-22 RX ADMIN — FOLIC ACID SCH MLS/HR: 5 INJECTION, SOLUTION INTRAMUSCULAR; INTRAVENOUS; SUBCUTANEOUS at 13:43

## 2019-02-22 RX ADMIN — ASCORBIC ACID, VITAMIN A PALMITATE, CHOLECALCIFEROL, THIAMINE HYDROCHLORIDE, RIBOFLAVIN-5 PHOSPHATE SODIUM, PYRIDOXINE HYDROCHLORIDE, NIACINAMIDE, DEXPANTHENOL, ALPHA-TOCOPHEROL ACETATE, VITAMIN K1, FOLIC ACID, BIOTIN, CYANOCOBALAMIN 1 MLS/HR: 200; 3300; 200; 6; 3.6; 6; 40; 15; 10; 150; 600; 60; 5 INJECTION, SOLUTION INTRAVENOUS at 02:51

## 2019-02-22 RX ADMIN — IOHEXOL ONE ML: 300 INJECTION, SOLUTION INTRAVENOUS at 10:29

## 2019-02-22 RX ADMIN — DEXAMETHASONE SODIUM PHOSPHATE PRN MG: 10 INJECTION, SOLUTION INTRAMUSCULAR; INTRAVENOUS at 12:09

## 2019-02-22 RX ADMIN — HYDROMORPHONE HYDROCHLORIDE 1 MG: 1 INJECTION, SOLUTION INTRAMUSCULAR; INTRAVENOUS; SUBCUTANEOUS at 12:15

## 2019-02-22 RX ADMIN — HYDROMORPHONE HYDROCHLORIDE PRN MG: 1 INJECTION, SOLUTION INTRAMUSCULAR; INTRAVENOUS; SUBCUTANEOUS at 12:15

## 2019-02-22 RX ADMIN — IOHEXOL ONE ML: 300 INJECTION, SOLUTION INTRAVENOUS at 03:42

## 2019-02-22 RX ADMIN — HYDROMORPHONE HYDROCHLORIDE PRN MG: 1 INJECTION, SOLUTION INTRAMUSCULAR; INTRAVENOUS; SUBCUTANEOUS at 02:55

## 2019-02-22 RX ADMIN — ENOXAPARIN SODIUM 1 MG: 100 INJECTION SUBCUTANEOUS at 08:41

## 2019-02-22 NOTE — PN
Date/Time of Note


Date/Time of Note


DATE: 2/22/19 


TIME: 16:32





Assessment/Plan


Lines/Catheters


IV C[facility]ter Type (from [facility]):  Peripheral IV





Assessment/Plan


Assessment/Plan


Hospital day #2 for small bowel obstruction


CT -shows fecalization of small bowel without contrast into the cecum


CT is very similar to prior admissions


Abdominal series 4 AM.  May need surgery on this admission if SBO persists.


She has had multiple admissions for small bowel obstruction.  All of been 


treated conservatively due to the fact that she has had 7 prior abdominal 


surgeries.  I would hope that this resolved without surgery but I am going more 


concerned each day that resolution is unlikely based on her clinical findings 


and CT





Subjective


24 Hr Interval Summary


Constitutional:  other (Complaining of abdominal pain.  Had bowel movement 


yesterday but denies flatus or bowel movement today. minimal NG tube output 


overnight )





Exam/Review of Systems


Vital Signs


Vitals





Vital Signs


  Date      Temp  Pulse  Resp  B/P (MAP)   Pulse Ox  O2          O2 Flow    FiO2


Time                                                 Delivery    Rate


   2/22/19  97.8     72    18      103/60        95  Room Air


     14:24                           (74)








Intake and Output





2/21/19 2/21/19 2/22/19





1515:00


23:00


07:00





IntakeIntake Total


1000 ml


200 ml


800 ml





OutputOutput Total


300 ml


0 ml





BalanceBalance


700 ml


200 ml


800 ml














Exam


Constitutional:  alert, oriented, well developed


Neck:  supple


Respiratory:  clear to auscultation


Gastrointestinal:  soft, non-tender





Results


Result Diagram:  


2/22/19 0542                                                                    


           2/22/19 0542














AYESHA SHEARER MD           Feb 22, 2019 16:34

## 2019-02-22 NOTE — PN
Date/Time of Note


Date/Time of Note


DATE: 2/22/19 


TIME: 11:15





Assessment/Plan


VTE Prophylaxis


Risk score (from Ns)>0 risk:  1


SCD applied (from Ns):  Yes


Pharmacological prophylaxis:  NA/contraindicated


Pharm contraindication:  low risk/ambulating





Lines/Catheters


IV Catheter Type (from [facility]):  Peripheral IV





Assessment/Plan


Hospital Course


SUBJECTIVE: Patient was unable to tolerate a clear diet yesterday secondary to 


abdominal pain.  Today she is with NG tube and is getting a repeat CT with 


contrast.  However, patient had bowel movement and she also feels passing 


flatus.








OBJECTIVE: Vital signs-see below








PHYSICAL EXAM:


Constitutional: Well-developed, adequately built, lying in bed comfortably.


Psych:  nl mood/affect, no complaints


Head:  atraumatic, normocephalic


Eyes:  nl conjunctiva, nl sclera


ENMT:  mucosa pink and moist, nl external ears & nose


Neck:  non-tender, supple


Respiratory:  clear to auscultation, normal air movement


Cardiovascular:  nl pulses, regular rate and rhythm


Gastrointestinal:  +Tender, no rebound tenderness, bowel sounds active in all 4 


quadrants.


Musculoskeletal/extremities:  nl extremities to inspection, motor strength equal


bilaterally, no focal deficit. Normal pulses,no cyanosis, no edema.


Neurological: Alert oriented 3,nl speech, nl strength


Skin:  nl turgor





ASSESSMENT/PLAN: 68-year-old female with a history of multiple abdominal surg


eries in the past, recurrent small bowel obstructions, here with abdominal pain,


found to have partial small bowel obstruction.





1.  Partial small bowel obstruction, recurrent.


-Patient failed diet, s/p surgery evaluation


-on  NG tube decompression=> not much drainage


-Follow-up imaging studies and follow-up surgery recommendations.





2.  Anemia, likely chronic.


-Stable H&H.  No overt bleeding.  Continue to monitor.





DVT prophylaxis: SCDs


PUD prophylaxis: Protonix


CODE STATUS: Full code


Diet: N.p.o., until cleared by surgery





Disposition: Continue current management and follow-up surgery recommendations.





Patient was seen in collaboration with .


Result Diagram:  


2/22/19 0542                                                                    


           2/22/19 0542





Results 24hrs





Laboratory Tests


               Test
                                2/22/19
05:42


               White Blood Count                           3.8  L


               Red Blood Count                            3.26  L


               Hemoglobin                                 10.1  L


               Hematocrit                                 30.6  L


               Mean Corpuscular Volume                     93.9


               Mean Corpuscular Hemoglobin                 31.0


               Mean Corpuscular Hemoglobin
Concent        33.0  



               Red Cell Distribution Width                 14.2


               Platelet Count                               234


               Mean Platelet Volume                         9.9


               Immature Granulocytes %                    0.300


               Neutrophils %                               69.5


               Lymphocytes %                               20.1


               Monocytes %                                  8.7


               Eosinophils %                                1.1


               Basophils %                                  0.3


               Nucleated Red Blood Cells %                  0.0


               Immature Granulocytes #                    0.010


               Neutrophils #                                2.6


               Lymphocytes #                                0.8


               Monocytes #                                  0.3


               Eosinophils #                                0.0


               Basophils #                                  0.0


               Nucleated Red Blood Cells #                  0.0


               Sodium Level                                 135


               Potassium Level                              3.5


               Chloride Level                               103


               Carbon Dioxide Level                          27


               Anion Gap                                      5


               Blood Urea Nitrogen                           9  #


               Creatinine                                  0.56


               Est Glomerular Filtrat Rate
mL/min   > 60  



               Glucose Level                                119


               Hemoglobin A1c                               5.5


               Calcium Level                                8.7


               Total Bilirubin                              0.6


               Direct Bilirubin                            0.00


               Indirect Bilirubin                           0.6


               Aspartate Amino Transf
(AST/SGOT)            21  



               Alanine Aminotransferase
(ALT/SGPT)          14  



               Alkaline Phosphatase                          46


               Total Protein                               6.6  #


               Albumin                                      3.5


               Globulin                                    3.10


               Albumin/Globulin Ratio                      1.12








Exam/Review of Systems


Exam


Vitals





Vital Signs


  Date      Temp  Pulse  Resp  B/P (MAP)   Pulse Ox  O2          O2 Flow    FiO2


Time                                                 Delivery    Rate


   2/22/19  98.2     69    18      113/71        98  Room Air


     08:13                           (85)








Intake and Output





2/21/19 2/21/19 2/22/19





1515:00


23:00


07:00





IntakeIntake Total


1000 ml


200 ml


800 ml





OutputOutput Total


300 ml


0 ml





BalanceBalance


700 ml


200 ml


800 ml














Results


Results 24hrs





Laboratory Tests


               Test
                                2/22/19
05:42


               White Blood Count                           3.8  L


               Red Blood Count                            3.26  L


               Hemoglobin                                 10.1  L


               Hematocrit                                 30.6  L


               Mean Corpuscular Volume                     93.9


               Mean Corpuscular Hemoglobin                 31.0


               Mean Corpuscular Hemoglobin
Concent        33.0  



               Red Cell Distribution Width                 14.2


               Platelet Count                               234


               Mean Platelet Volume                         9.9


               Immature Granulocytes %                    0.300


               Neutrophils %                               69.5


               Lymphocytes %                               20.1


               Monocytes %                                  8.7


               Eosinophils %                                1.1


               Basophils %                                  0.3


               Nucleated Red Blood Cells %                  0.0


               Immature Granulocytes #                    0.010


               Neutrophils #                                2.6


               Lymphocytes #                                0.8


               Monocytes #                                  0.3


               Eosinophils #                                0.0


               Basophils #                                  0.0


               Nucleated Red Blood Cells #                  0.0


               Sodium Level                                 135


               Potassium Level                              3.5


               Chloride Level                               103


               Carbon Dioxide Level                          27


               Anion Gap                                      5


               Blood Urea Nitrogen                           9  #


               Creatinine                                  0.56


               Est Glomerular Filtrat Rate
mL/min   > 60  



               Glucose Level                                119


               Hemoglobin A1c                               5.5


               Calcium Level                                8.7


               Total Bilirubin                              0.6


               Direct Bilirubin                            0.00


               Indirect Bilirubin                           0.6


               Aspartate Amino Transf
(AST/SGOT)            21  



               Alanine Aminotransferase
(ALT/SGPT)          14  



               Alkaline Phosphatase                          46


               Total Protein                               6.6  #


               Albumin                                      3.5


               Globulin                                    3.10


               Albumin/Globulin Ratio                      1.12








Medications


Medication





Current Medications


Dextrose/Sodium Chloride 1,000 ml @  100 mls/hr Q10H IV  Last administered on 


2/22/19at 02:51; Admin Dose 100 MLS/HR;  Start 2/21/19 at 05:09


IV Flush (NS 3 ml) 3 ml PER PROTOCOL IV ;  Start 2/21/19 at 05:30


Hydromorphone HCl (Dilaudid) 0.5 mg Q4H  PRN IV .SEVERE PAIN 7-10 Last 


administered on 2/22/19at 02:55; Admin Dose 0.5 MG;  Start 2/21/19 at 05:30


Enoxaparin Sodium (Lovenox) 30 mg DAILY SC  Last administered on 2/22/19at 


08:41; Admin Dose 30 MG;  Start 2/21/19 at 09:00











TRISH KOO NP               Feb 22, 2019 11:16

## 2019-02-23 VITALS — DIASTOLIC BLOOD PRESSURE: 47 MMHG | RESPIRATION RATE: 18 BRPM | SYSTOLIC BLOOD PRESSURE: 106 MMHG | HEART RATE: 80 BPM

## 2019-02-23 VITALS — DIASTOLIC BLOOD PRESSURE: 58 MMHG | SYSTOLIC BLOOD PRESSURE: 104 MMHG | RESPIRATION RATE: 16 BRPM | HEART RATE: 79 BPM

## 2019-02-23 VITALS — DIASTOLIC BLOOD PRESSURE: 64 MMHG | HEART RATE: 84 BPM | RESPIRATION RATE: 18 BRPM | SYSTOLIC BLOOD PRESSURE: 105 MMHG

## 2019-02-23 LAB
ADD MAN DIFF?: NO
ANION GAP: 7 (ref 5–13)
BASOPHIL #: 0 10^3/UL (ref 0–0.1)
BASOPHILS %: 0.3 % (ref 0–2)
BLOOD UREA NITROGEN: 5 MG/DL (ref 7–20)
CALCIUM: 8.8 MG/DL (ref 8.4–10.2)
CARBON DIOXIDE: 25 MMOL/L (ref 21–31)
CHLORIDE: 108 MMOL/L (ref 97–110)
CREATININE: 0.5 MG/DL (ref 0.44–1)
EOSINOPHILS #: 0 10^3/UL (ref 0–0.5)
EOSINOPHILS %: 1.1 % (ref 0–7)
GLUCOSE: 112 MG/DL (ref 70–220)
HEMATOCRIT: 30.7 % (ref 37–47)
HEMOGLOBIN: 10 G/DL (ref 12–16)
IMMATURE GRANS #M: 0.01 10^3/UL (ref 0–0.03)
IMMATURE GRANS % (M): 0.3 % (ref 0–0.43)
LYMPHOCYTES #: 0.8 10^3/UL (ref 0.8–2.9)
LYMPHOCYTES %: 22 % (ref 15–51)
MAGNESIUM: 1.9 MG/DL (ref 1.7–2.5)
MEAN CORPUSCULAR HEMOGLOBIN: 30.9 PG (ref 29–33)
MEAN CORPUSCULAR HGB CONC: 32.6 G/DL (ref 32–37)
MEAN CORPUSCULAR VOLUME: 94.8 FL (ref 82–101)
MEAN PLATELET VOLUME: 9.7 FL (ref 7.4–10.4)
MONOCYTE #: 0.3 10^3/UL (ref 0.3–0.9)
MONOCYTES %: 8.5 % (ref 0–11)
NEUTROPHIL #: 2.4 10^3/UL (ref 1.6–7.5)
NEUTROPHILS %: 67.8 % (ref 39–77)
NUCLEATED RED BLOOD CELLS #: 0 10^3/UL (ref 0–0)
NUCLEATED RED BLOOD CELLS%: 0 /100WBC (ref 0–0)
PLATELET COUNT: 234 10^3/UL (ref 140–415)
POTASSIUM: 3.4 MMOL/L (ref 3.5–5.1)
RED BLOOD COUNT: 3.24 10^6/UL (ref 4.2–5.4)
RED CELL DISTRIBUTION WIDTH: 14 % (ref 11.5–14.5)
SODIUM: 140 MMOL/L (ref 135–144)
WHITE BLOOD COUNT: 3.5 10^3/UL (ref 4.8–10.8)

## 2019-02-23 RX ADMIN — FOLIC ACID SCH MLS/HR: 5 INJECTION, SOLUTION INTRAMUSCULAR; INTRAVENOUS; SUBCUTANEOUS at 00:15

## 2019-02-23 RX ADMIN — HYDROMORPHONE HYDROCHLORIDE 1 MG: 1 INJECTION, SOLUTION INTRAMUSCULAR; INTRAVENOUS; SUBCUTANEOUS at 20:26

## 2019-02-23 RX ADMIN — FOLIC ACID SCH MLS/HR: 5 INJECTION, SOLUTION INTRAMUSCULAR; INTRAVENOUS; SUBCUTANEOUS at 10:19

## 2019-02-23 RX ADMIN — HYDROMORPHONE HYDROCHLORIDE PRN MG: 1 INJECTION, SOLUTION INTRAMUSCULAR; INTRAVENOUS; SUBCUTANEOUS at 20:26

## 2019-02-23 RX ADMIN — ASCORBIC ACID, VITAMIN A PALMITATE, CHOLECALCIFEROL, THIAMINE HYDROCHLORIDE, RIBOFLAVIN-5 PHOSPHATE SODIUM, PYRIDOXINE HYDROCHLORIDE, NIACINAMIDE, DEXPANTHENOL, ALPHA-TOCOPHEROL ACETATE, VITAMIN K1, FOLIC ACID, BIOTIN, CYANOCOBALAMIN 1 MLS/HR: 200; 3300; 200; 6; 3.6; 6; 40; 15; 10; 150; 600; 60; 5 INJECTION, SOLUTION INTRAVENOUS at 17:09

## 2019-02-23 RX ADMIN — POTASSIUM CHLORIDE 1 MLS/HR: 200 INJECTION, SOLUTION INTRAVENOUS at 13:17

## 2019-02-23 RX ADMIN — ASCORBIC ACID, VITAMIN A PALMITATE, CHOLECALCIFEROL, THIAMINE HYDROCHLORIDE, RIBOFLAVIN-5 PHOSPHATE SODIUM, PYRIDOXINE HYDROCHLORIDE, NIACINAMIDE, DEXPANTHENOL, ALPHA-TOCOPHEROL ACETATE, VITAMIN K1, FOLIC ACID, BIOTIN, CYANOCOBALAMIN 1 MLS/HR: 200; 3300; 200; 6; 3.6; 6; 40; 15; 10; 150; 600; 60; 5 INJECTION, SOLUTION INTRAVENOUS at 10:19

## 2019-02-23 RX ADMIN — FOLIC ACID SCH MLS/HR: 5 INJECTION, SOLUTION INTRAMUSCULAR; INTRAVENOUS; SUBCUTANEOUS at 17:09

## 2019-02-23 RX ADMIN — ASCORBIC ACID, VITAMIN A PALMITATE, CHOLECALCIFEROL, THIAMINE HYDROCHLORIDE, RIBOFLAVIN-5 PHOSPHATE SODIUM, PYRIDOXINE HYDROCHLORIDE, NIACINAMIDE, DEXPANTHENOL, ALPHA-TOCOPHEROL ACETATE, VITAMIN K1, FOLIC ACID, BIOTIN, CYANOCOBALAMIN 1 MLS/HR: 200; 3300; 200; 6; 3.6; 6; 40; 15; 10; 150; 600; 60; 5 INJECTION, SOLUTION INTRAVENOUS at 00:15

## 2019-02-23 NOTE — PN
Date/Time of Note


Date/Time of Note


DATE: 2/23/19 


TIME: 12:56





Assessment/Plan


Lines/Catheters


IV Catheter Type (from Nrsg):  Peripheral IV





Assessment/Plan


Assessment/Plan


Hospital day #3 for small bowel obstruction


Resolving.  Patient has less pain and tenderness today.  Flatus overnight.  


Minimal NG tube output.


Nominal series for today.





Subjective


24 Hr Interval Summary


Constitutional:  improved, ambulates, flatus


Feeding:  NPO


Pain Control:  well controlled





Exam/Review of Systems


Vital Signs


Vitals





Vital Signs


  Date      Temp  Pulse  Resp  B/P (MAP)   Pulse Ox  O2          O2 Flow    FiO2


Time                                                 Delivery    Rate


   2/23/19  98.4     79    16      104/58        97


     08:11                           (73)


   2/23/19                                           Room Air


     02:00








Intake and Output





2/22/19 2/22/19 2/23/19





1515:00


23:00


07:00





IntakeIntake Total


1000 ml


500 ml


1000 ml





OutputOutput Total


400 ml


100 ml





BalanceBalance


1000 ml


100 ml


900 ml














Exam


Constitutional:  alert, oriented, well developed


Psych:  no complaints


Neck:  supple


Respiratory:  clear to auscultation


Cardiovascular:  regular rate and rhythm


Gastrointestinal:  soft, non-tender





Results


Result Diagram:  


2/23/19 0532                                                                    


           2/23/19 0532














AYESHA SHEARER MD           Feb 23, 2019 12:57

## 2019-02-23 NOTE — PN
Date/Time of Note


Date/Time of Note


DATE: 2/23/19 


TIME: 12:16





Assessment/Plan


VTE Prophylaxis


Risk score (from Nsg)>0 risk:  1


SCD applied (from Ns):  Yes


Pharmacological prophylaxis:  NA/contraindicated


Pharm contraindication:  low risk/ambulating





Lines/Catheters


IV Catheter Type (from Nrsg):  Peripheral IV





Assessment/Plan


Hospital Course


SUBJECTIVE: Patient continues to have abdominal pain.  She was not able to 


tolerate diet.  NG tube draining minimal.








OBJECTIVE: Vital signs-see below








PHYSICAL EXAM:


Constitutional: Well-developed, adequately built, lying in bed comfortably.


Psych:  nl mood/affect, no complaints


Head:  atraumatic, normocephalic


Eyes:  nl conjunctiva, nl sclera


ENMT:  mucosa pink and moist, nl external ears & nose


Neck:  non-tender, supple


Respiratory:  clear to auscultation, normal air movement


Cardiovascular:  nl pulses, regular rate and rhythm


Gastrointestinal:  +Tender, no rebound tenderness, bowel sounds active in all 4 


quadrants.


Musculoskeletal/extremities:  nl extremities to inspection, motor strength equal


bilaterally, no focal deficit. Normal pulses,no cyanosis, no edema.


Neurological: Alert oriented 3,nl speech, nl strength


Skin:  nl turgor





ASSESSMENT/PLAN: 68-year-old female with a history of multiple abdominal 


surgeries in the past, recurrent small bowel obstructions, here with abdominal 


pain, found to have partial small bowel obstruction.





1.  Partial small bowel obstruction, recurrent.


-Symptoms improved, however multiple repeat imaging showed persistent 


obstruction..If her symptoms does not get improved over the next 24-48 hours, 


she would likely need surgical intervention per surgery...


-Continue n.p.o. status, NG tube decompression, pain control


-Follow-up abdominal series ordered.


-f/u Surgical recommendations





2.  Anemia, likely chronic.


-Stable H&H.  No overt bleeding.  Continue to monitor.





DVT prophylaxis: SCDs


PUD prophylaxis: Protonix


CODE STATUS: Full code


Diet: N.p.o., until cleared by surgery





Disposition: Continue current management and follow-up surgery recommendations.





Patient was seen in collaboration with 


Result Diagram:  


2/23/19 0532                                                                    


           2/23/19 0532





Results 24hrs





Laboratory Tests


               Test
                                2/23/19
05:32


               White Blood Count                           3.5  L


               Red Blood Count                            3.24  L


               Hemoglobin                                 10.0  L


               Hematocrit                                 30.7  L


               Mean Corpuscular Volume                     94.8


               Mean Corpuscular Hemoglobin                 30.9


               Mean Corpuscular Hemoglobin
Concent        32.6  



               Red Cell Distribution Width                 14.0


               Platelet Count                               234


               Mean Platelet Volume                         9.7


               Immature Granulocytes %                    0.300


               Neutrophils %                               67.8


               Lymphocytes %                               22.0


               Monocytes %                                  8.5


               Eosinophils %                                1.1


               Basophils %                                  0.3


               Nucleated Red Blood Cells %                  0.0


               Immature Granulocytes #                    0.010


               Neutrophils #                                2.4


               Lymphocytes #                                0.8


               Monocytes #                                  0.3


               Eosinophils #                                0.0


               Basophils #                                  0.0


               Nucleated Red Blood Cells #                  0.0


               Sodium Level                                 140


               Potassium Level                             3.4  L


               Chloride Level                               108


               Carbon Dioxide Level                          25


               Anion Gap                                      7


               Blood Urea Nitrogen                           5  L


               Creatinine                                  0.50


               Est Glomerular Filtrat Rate
mL/min   > 60  



               Glucose Level                                112


               Calcium Level                                8.8


               Magnesium Level                              1.9








Exam/Review of Systems


Exam


Vitals





Vital Signs


  Date      Temp  Pulse  Resp  B/P (MAP)   Pulse Ox  O2          O2 Flow    FiO2


Time                                                 Delivery    Rate


   2/23/19  98.4     79    16      104/58        97


     08:11                           (73)


   2/23/19                                           Room Air


     02:00








Intake and Output





2/22/19 2/22/19 2/23/19





1515:00


23:00


07:00





IntakeIntake Total


1000 ml


500 ml


1000 ml





OutputOutput Total


400 ml


100 ml





BalanceBalance


1000 ml


100 ml


900 ml














Results


Results 24hrs





Laboratory Tests


               Test
                                2/23/19
05:32


               White Blood Count                           3.5  L


               Red Blood Count                            3.24  L


               Hemoglobin                                 10.0  L


               Hematocrit                                 30.7  L


               Mean Corpuscular Volume                     94.8


               Mean Corpuscular Hemoglobin                 30.9


               Mean Corpuscular Hemoglobin
Concent        32.6  



               Red Cell Distribution Width                 14.0


               Platelet Count                               234


               Mean Platelet Volume                         9.7


               Immature Granulocytes %                    0.300


               Neutrophils %                               67.8


               Lymphocytes %                               22.0


               Monocytes %                                  8.5


               Eosinophils %                                1.1


               Basophils %                                  0.3


               Nucleated Red Blood Cells %                  0.0


               Immature Granulocytes #                    0.010


               Neutrophils #                                2.4


               Lymphocytes #                                0.8


               Monocytes #                                  0.3


               Eosinophils #                                0.0


               Basophils #                                  0.0


               Nucleated Red Blood Cells #                  0.0


               Sodium Level                                 140


               Potassium Level                             3.4  L


               Chloride Level                               108


               Carbon Dioxide Level                          25


               Anion Gap                                      7


               Blood Urea Nitrogen                           5  L


               Creatinine                                  0.50


               Est Glomerular Filtrat Rate
mL/min   > 60  



               Glucose Level                                112


               Calcium Level                                8.8


               Magnesium Level                              1.9








Medications


Medication





Current Medications


Dextrose/Sodium Chloride 1,000 ml @  100 mls/hr Q10H IV  Last administered on 


2/23/19at 10:19; Admin Dose 100 MLS/HR;  Start 2/21/19 at 05:09


IV Flush (NS 3 ml) 3 ml PER PROTOCOL IV ;  Start 2/21/19 at 05:30


Hydromorphone HCl (Dilaudid) 0.5 mg Q4H  PRN IV .SEVERE PAIN 7-10 Last 


administered on 2/22/19at 12:15; Admin Dose 0.5 MG;  Start 2/21/19 at 05:30


Ondansetron HCl (Zofran Inj) 4 mg Q6H  PRN IV NAUSEA AND/OR VOMITING Last 


administered on 2/22/19at 21:44; Admin Dose 4 MG;  Start 2/22/19 at 11:30


Potassium Chloride 100 ml @  50 mls/hr ONCE  ONCE IVPB ;  Start 2/23/19 at 


12:00;  Stop 2/23/19 at 13:59











TRISH KOO NP               Feb 23, 2019 12:19

## 2019-02-24 VITALS — SYSTOLIC BLOOD PRESSURE: 100 MMHG | DIASTOLIC BLOOD PRESSURE: 51 MMHG | HEART RATE: 71 BPM | RESPIRATION RATE: 20 BRPM

## 2019-02-24 VITALS — DIASTOLIC BLOOD PRESSURE: 55 MMHG | HEART RATE: 81 BPM | SYSTOLIC BLOOD PRESSURE: 92 MMHG | RESPIRATION RATE: 16 BRPM

## 2019-02-24 VITALS — SYSTOLIC BLOOD PRESSURE: 95 MMHG | HEART RATE: 78 BPM | DIASTOLIC BLOOD PRESSURE: 52 MMHG | RESPIRATION RATE: 16 BRPM

## 2019-02-24 VITALS — HEART RATE: 84 BPM | RESPIRATION RATE: 18 BRPM | SYSTOLIC BLOOD PRESSURE: 120 MMHG | DIASTOLIC BLOOD PRESSURE: 67 MMHG

## 2019-02-24 LAB
ADD MAN DIFF?: NO
ANION GAP: 5 (ref 5–13)
BASOPHIL #: 0 10^3/UL (ref 0–0.1)
BASOPHILS %: 0.4 % (ref 0–2)
BLOOD UREA NITROGEN: 8 MG/DL (ref 7–20)
CALCIUM: 8.6 MG/DL (ref 8.4–10.2)
CARBON DIOXIDE: 27 MMOL/L (ref 21–31)
CHLORIDE: 103 MMOL/L (ref 97–110)
CREATININE: 0.51 MG/DL (ref 0.44–1)
EOSINOPHILS #: 0.1 10^3/UL (ref 0–0.5)
EOSINOPHILS %: 1.5 % (ref 0–7)
GLUCOSE: 117 MG/DL (ref 70–220)
HEMATOCRIT: 29 % (ref 37–47)
HEMOGLOBIN: 9.8 G/DL (ref 12–16)
IMMATURE GRANS #M: 0.02 10^3/UL (ref 0–0.03)
IMMATURE GRANS % (M): 0.4 % (ref 0–0.43)
LYMPHOCYTES #: 0.8 10^3/UL (ref 0.8–2.9)
LYMPHOCYTES %: 17.1 % (ref 15–51)
MEAN CORPUSCULAR HEMOGLOBIN: 31.4 PG (ref 29–33)
MEAN CORPUSCULAR HGB CONC: 33.8 G/DL (ref 32–37)
MEAN CORPUSCULAR VOLUME: 92.9 FL (ref 82–101)
MEAN PLATELET VOLUME: 9.2 FL (ref 7.4–10.4)
MONOCYTE #: 0.4 10^3/UL (ref 0.3–0.9)
MONOCYTES %: 9.3 % (ref 0–11)
NEUTROPHIL #: 3.4 10^3/UL (ref 1.6–7.5)
NEUTROPHILS %: 71.3 % (ref 39–77)
NUCLEATED RED BLOOD CELLS #: 0 10^3/UL (ref 0–0)
NUCLEATED RED BLOOD CELLS%: 0 /100WBC (ref 0–0)
PLATELET COUNT: 240 10^3/UL (ref 140–415)
POTASSIUM: 3.7 MMOL/L (ref 3.5–5.1)
RED BLOOD COUNT: 3.12 10^6/UL (ref 4.2–5.4)
RED CELL DISTRIBUTION WIDTH: 14.3 % (ref 11.5–14.5)
SODIUM: 135 MMOL/L (ref 135–144)
WHITE BLOOD COUNT: 4.7 10^3/UL (ref 4.8–10.8)

## 2019-02-24 RX ADMIN — IBUPROFEN 1 MG: 400 TABLET, FILM COATED ORAL at 11:30

## 2019-02-24 RX ADMIN — ASCORBIC ACID, VITAMIN A PALMITATE, CHOLECALCIFEROL, THIAMINE HYDROCHLORIDE, RIBOFLAVIN-5 PHOSPHATE SODIUM, PYRIDOXINE HYDROCHLORIDE, NIACINAMIDE, DEXPANTHENOL, ALPHA-TOCOPHEROL ACETATE, VITAMIN K1, FOLIC ACID, BIOTIN, CYANOCOBALAMIN 1 MLS/HR: 200; 3300; 200; 6; 3.6; 6; 40; 15; 10; 150; 600; 60; 5 INJECTION, SOLUTION INTRAVENOUS at 02:13

## 2019-02-24 RX ADMIN — ASCORBIC ACID, VITAMIN A PALMITATE, CHOLECALCIFEROL, THIAMINE HYDROCHLORIDE, RIBOFLAVIN-5 PHOSPHATE SODIUM, PYRIDOXINE HYDROCHLORIDE, NIACINAMIDE, DEXPANTHENOL, ALPHA-TOCOPHEROL ACETATE, VITAMIN K1, FOLIC ACID, BIOTIN, CYANOCOBALAMIN 1 MLS/HR: 200; 3300; 200; 6; 3.6; 6; 40; 15; 10; 150; 600; 60; 5 INJECTION, SOLUTION INTRAVENOUS at 12:59

## 2019-02-24 RX ADMIN — FOLIC ACID SCH MLS/HR: 5 INJECTION, SOLUTION INTRAMUSCULAR; INTRAVENOUS; SUBCUTANEOUS at 02:13

## 2019-02-24 RX ADMIN — IBUPROFEN SCH MG: 400 TABLET ORAL at 17:23

## 2019-02-24 RX ADMIN — IBUPROFEN 1 MG: 400 TABLET, FILM COATED ORAL at 17:23

## 2019-02-24 RX ADMIN — IBUPROFEN SCH MG: 400 TABLET ORAL at 18:08

## 2019-02-24 RX ADMIN — FOLIC ACID SCH MLS/HR: 5 INJECTION, SOLUTION INTRAMUSCULAR; INTRAVENOUS; SUBCUTANEOUS at 12:59

## 2019-02-24 RX ADMIN — IBUPROFEN SCH MG: 400 TABLET ORAL at 11:30

## 2019-02-24 RX ADMIN — IBUPROFEN 1 MG: 400 TABLET, FILM COATED ORAL at 18:08

## 2019-02-24 NOTE — PN
Date/Time of Note


Date/Time of Note


DATE: 2/24/19 


TIME: 09:05





Assessment/Plan


Lines/Catheters


IV Catheter Type (from Nrsg):  Peripheral IV





Assessment/Plan


Assessment/Plan


Resolving small bowel obstruction.


Multiple episodes of flatus yesterday and today.


DC NG tube today and start clears.


Possible discharge today or tomorrow depending on clinical status





Subjective


24 Hr Interval Summary


Constitutional:  no complaints, improved, flatus


Feeding:  advancing diet


Pain Control:  well controlled


Additional Comments


Patient states that her abdomen feels much much much better.





Exam/Review of Systems


Vital Signs


Vitals





Vital Signs


  Date      Temp  Pulse  Resp  B/P (MAP)   Pulse Ox  O2          O2 Flow    FiO2


Time                                                 Delivery    Rate


   2/24/19  98.0     78    16  95/52 (66)        99


     08:07


   2/24/19                                           Room Air


     02:34








Intake and Output





2/23/19 2/23/19 2/24/19





1515:00


23:00


07:00





IntakeIntake Total


500 ml


600 ml


500 ml





OutputOutput Total


200 ml





BalanceBalance


500 ml


600 ml


300 ml














Exam


Constitutional:  alert, oriented, well developed


Respiratory:  clear to auscultation


Cardiovascular:  regular rate and rhythm


Gastrointestinal:  soft, non-tender





Results


Result Diagram:  


2/24/19 0526                                                                    


           2/24/19 0526














AYESHA SHEARER MD           Feb 24, 2019 09:06

## 2019-02-24 NOTE — CONS
Assessment/Plan


Assessment/Plan


Assessment/Plan (Daily)


Right foot edema


Pain in right lower extremity


Peroneal tendinosis


Posterior tibialis tendinosis


Plantar fasciitis





Plan


Discussed stretching exercises to assist with plantar fasciitis. Continue with 


motrin and elevation assist with pain.  X-rays and MRI ordered of the right 


foot/ankle to rule out stress fracture or any soft tissue injury.  Patient may 


weight bear as tolerated.  Rest per primary team.  Patient may follow up in [facility] 


outpatient clinic.





Consultation Date/Type/Reason


Admit Date/Time





Date/Time of Note


DATE: 2/24/19 


TIME: 20:49





Hx of Present Illness


67 y/o F patient who was admitted for small bowel obstruction, presents to the 


floor with right foot and ankle pain.  Patient states the pain started couple 


days ago.  She denies any recent trauma or inciting event.  Patient states she 


was able to walk in the hospital during this admission, but now all of sudden 


noticed pain and is unable to walk on it.  Patient states it's a sharp aching 


pain.  Rates 8/10 and does not radiate proximally up the leg.


ROS


negative except for HPI





Past Medical History


SBO


Cervical cancer


Medical History:  other (Recurrent small bowel obstruction)


Home Meds


Reported Medications


Omega-3 Fatty Acids/Fish Oil (Fish Oil 1,000 mg Capsule) 1 Each Capsule, 1 EACH 


PO DAILY, CAP


   2/21/19


Multivitamins* (Theragran*) 1 Tab Tab, 1 TAB PO DAILY, TAB


   7/13/18


Discontinued Reported Medications


Glucosamine Sulfate 2KCL (GLUCOSAMINE) 1,000 Mg Tablet, 1000 MG PO DAILY, TAB


   2/21/19


Alendronate Sodium* (Fosamax*) 70 Mg Tablet, 70 MG PO QFRIDAY, #4 TAB


   7/13/18


Docusate Sodium* (Docusate Sodium*) 100 Mg Capsule, 100 MG PO BID, #60 CAP


   7/13/18


Medications





Current Medications


Dextrose/Sodium Chloride 1,000 ml @  100 mls/hr Q10H IV  Last administered on 


2/24/19at 12:59; Admin Dose 100 MLS/HR;  Start 2/21/19 at 05:09


IV Flush (NS 3 ml) 3 ml PER PROTOCOL IV ;  Start 2/21/19 at 05:30


Hydromorphone HCl (Dilaudid) 0.5 mg Q4H  PRN IV .SEVERE PAIN 7-10 Last 


administered on 2/23/19at 20:26; Admin Dose 0.5 MG;  Start 2/21/19 at 05:30


Ondansetron HCl (Zofran Inj) 4 mg Q6H  PRN IV NAUSEA AND/OR VOMITING Last 


administered on 2/22/19at 21:44; Admin Dose 4 MG;  Start 2/22/19 at 11:30


Ibuprofen (Motrin) 400 mg Q6 PO  Last administered on 2/24/19at 17:23; Admin 


Dose 400 MG;  Start 2/24/19 at 12:00


Allergies:  


Coded Allergies:  


     No Known Allergies (Verified  Allergy, Mild, 2/21/19)





Past Surgical History


Past Surgical Hx:  appendectomy, other (Multiple multiple abdominal surgeries)





Social History


Alcohol Use:  none


Smoking Status:  Never smoker


Drug Use:  none





Exam/Review of Systems


Exam


Vitals





Vital Signs


  Date      Temp  Pulse  Resp  B/P (MAP)   Pulse Ox  O2          O2 Flow    FiO2


Time                                                 Delivery    Rate


   2/24/19  98.7     81    16  92/55 (67)        99


     14:38


   2/24/19                                           Room Air


     02:34








Intake and Output





2/23/19 2/23/19 2/24/19





1515:00


23:00


07:00





IntakeIntake Total


500 ml


600 ml


500 ml





OutputOutput Total


200 ml





BalanceBalance


500 ml


600 ml


300 ml











Exam


DP/PT pulses palpable


protective sensations intact


Pain with plantar fascia medial band and along the medial tubercle of calcaneus.


Pain along the midsubstance and distal aspect of the medial band of plantar 


fascia


Pain at the 5th metatarsal base and along the peroneal tendon


Pain at the insertion of the posterior tibialis tendon


No pain with dorsiflexion or plantarflexion


Pain with eversion and inversion


No pain to ATFL, negative anterior drawer sign


Muscle strength 5/5 in all compartments of the foot


Non-pitting edema appreciated to right foot and ankle region.





Results


Result Diagram:  


2/24/19 0526                                                                    


           2/24/19 0526





Results 24hrs





Laboratory Tests


               Test
                                2/24/19
05:26


               White Blood Count                          4.7  #L


               Red Blood Count                            3.12  L


               Hemoglobin                                  9.8  L


               Hematocrit                                 29.0  L


               Mean Corpuscular Volume                     92.9


               Mean Corpuscular Hemoglobin                 31.4


               Mean Corpuscular Hemoglobin
Concent        33.8  



               Red Cell Distribution Width                 14.3


               Platelet Count                               240


               Mean Platelet Volume                         9.2


               Immature Granulocytes %                    0.400


               Neutrophils %                               71.3


               Lymphocytes %                               17.1


               Monocytes %                                  9.3


               Eosinophils %                                1.5


               Basophils %                                  0.4


               Nucleated Red Blood Cells %                  0.0


               Immature Granulocytes #                    0.020


               Neutrophils #                                3.4


               Lymphocytes #                                0.8


               Monocytes #                                  0.4


               Eosinophils #                                0.1


               Basophils #                                  0.0


               Nucleated Red Blood Cells #                  0.0


               Sodium Level                                 135


               Potassium Level                              3.7


               Chloride Level                               103


               Carbon Dioxide Level                          27


               Anion Gap                                      5


               Blood Urea Nitrogen                            8


               Creatinine                                  0.51


               Est Glomerular Filtrat Rate
mL/min   > 60  



               Glucose Level                                117


               Calcium Level                                8.6








Medications


Medication





Current Medications


Dextrose/Sodium Chloride 1,000 ml @  100 mls/hr Q10H IV  Last administered on 


2/24/19at 12:59; Admin Dose 100 MLS/HR;  Start 2/21/19 at 05:09


IV Flush (NS 3 ml) 3 ml PER PROTOCOL IV ;  Start 2/21/19 at 05:30


Hydromorphone HCl (Dilaudid) 0.5 mg Q4H  PRN IV .SEVERE PAIN 7-10 Last 


administered on 2/23/19at 20:26; Admin Dose 0.5 MG;  Start 2/21/19 at 05:30


Ondansetron HCl (Zofran Inj) 4 mg Q6H  PRN IV NAUSEA AND/OR VOMITING Last 


administered on 2/22/19at 21:44; Admin Dose 4 MG;  Start 2/22/19 at 11:30


Ibuprofen (Motrin) 400 mg Q6 PO  Last administered on 2/24/19at 17:23; Admin 


Dose 400 MG;  Start 2/24/19 at 12:00











WILLIAM DOWNEY DPM             Feb 24, 2019 20:49

## 2019-02-24 NOTE — PN
Date/Time of Note


Date/Time of Note


DATE: 2/24/19 


TIME: 09:07





Assessment/Plan


VTE Prophylaxis


Risk score (from Ns)>0 risk:  3


SCD applied (from Ns):  Yes


Pharmacological prophylaxis:  NA/contraindicated


Pharm contraindication:  low risk/ambulating





Lines/Catheters


IV Catheter Type (from [facility]):  Peripheral IV





Assessment/Plan


Hospital Course


SUBJECTIVE: Patient with no abdominal pain.  She has been passing flatus.  No 


bowel movement today.  Patient now complains of bilateral heel pain more on 


right, get worse when she walks.








OBJECTIVE: Vital signs-see below








PHYSICAL EXAM:


Constitutional: Well-developed, adequately built, lying in bed comfortably.


Psych:  nl mood/affect, no complaints


Head:  atraumatic, normocephalic


Eyes:  nl conjunctiva, nl sclera


ENMT:  mucosa pink and moist, nl external ears & nose


Neck:  non-tender, supple


Respiratory:  clear to auscultation, normal air movement


Cardiovascular:  nl pulses, regular rate and rhythm


Gastrointestinal: Soft, nontender, no rebound tenderness, bowel sounds active in


all 4 quadrants.


Musculoskeletal/extremities: +Tenderness heel R>L, nl extremities to inspection,


motor strength equal bilaterally, no focal deficit. Normal pulses,no cyanosis, 


no edema.


Neurological: Alert oriented 3,nl speech, nl strength


Skin:  nl turgor





ASSESSMENT/PLAN: 68-year-old female with a history of multiple abdominal 


surgeries in the past, recurrent small bowel obstructions, here with abdominal 


pain, found to have partial small bowel obstruction.





1.  Partial small bowel obstruction, recurrent.


-Resolving.  Bowel series with no evidence of obstruction.  Surgery managing and


plan is to DC NG tube and start on a clear diet and observe for another 24 


hours.





2.  Bilateral heel pain,R>L, worse with walking, likely plantar fasciitis


-We will try some NSAIDs and will consider podiatry evaluation.


-Obtain a uric acid level to rule out gout.


-Obtain ultrasound to rule out DVT





3.  Anemia, likely chronic.


-Stable H&H.  No overt bleeding.  Continue to monitor.





DVT prophylaxis: SCDs


PUD prophylaxis: Protonix


CODE STATUS: Full code


Diet: Clear diet, advance per surgery





Disposition: Continue current management .  Await for clinical improvement





Patient was seen in collaboration with 


Result Diagram:  


2/24/19 0526 2/24/19 0526





Results 24hrs





Laboratory Tests


               Test
                                2/24/19
05:26


               White Blood Count                          4.7  #L


               Red Blood Count                            3.12  L


               Hemoglobin                                  9.8  L


               Hematocrit                                 29.0  L


               Mean Corpuscular Volume                     92.9


               Mean Corpuscular Hemoglobin                 31.4


               Mean Corpuscular Hemoglobin
Concent        33.8  



               Red Cell Distribution Width                 14.3


               Platelet Count                               240


               Mean Platelet Volume                         9.2


               Immature Granulocytes %                    0.400


               Neutrophils %                               71.3


               Lymphocytes %                               17.1


               Monocytes %                                  9.3


               Eosinophils %                                1.5


               Basophils %                                  0.4


               Nucleated Red Blood Cells %                  0.0


               Immature Granulocytes #                    0.020


               Neutrophils #                                3.4


               Lymphocytes #                                0.8


               Monocytes #                                  0.4


               Eosinophils #                                0.1


               Basophils #                                  0.0


               Nucleated Red Blood Cells #                  0.0


               Sodium Level                                 135


               Potassium Level                              3.7


               Chloride Level                               103


               Carbon Dioxide Level                          27


               Anion Gap                                      5


               Blood Urea Nitrogen                            8


               Creatinine                                  0.51


               Est Glomerular Filtrat Rate
mL/min   > 60  



               Glucose Level                                117


               Calcium Level                                8.6








Exam/Review of Systems


Exam


Vitals





Vital Signs


  Date      Temp  Pulse  Resp  B/P (MAP)   Pulse Ox  O2          O2 Flow    FiO2


Time                                                 Delivery    Rate


   2/24/19  98.0     78    16  95/52 (66)        99


     08:07


   2/24/19                                           Room Air


     02:34








Intake and Output





2/23/19 2/23/19 2/24/19





1515:00


23:00


07:00





IntakeIntake Total


500 ml


600 ml


500 ml





OutputOutput Total


200 ml





BalanceBalance


500 ml


600 ml


300 ml














Results


Results 24hrs





Laboratory Tests


               Test
                                2/24/19
05:26


               White Blood Count                          4.7  #L


               Red Blood Count                            3.12  L


               Hemoglobin                                  9.8  L


               Hematocrit                                 29.0  L


               Mean Corpuscular Volume                     92.9


               Mean Corpuscular Hemoglobin                 31.4


               Mean Corpuscular Hemoglobin
Concent        33.8  



               Red Cell Distribution Width                 14.3


               Platelet Count                               240


               Mean Platelet Volume                         9.2


               Immature Granulocytes %                    0.400


               Neutrophils %                               71.3


               Lymphocytes %                               17.1


               Monocytes %                                  9.3


               Eosinophils %                                1.5


               Basophils %                                  0.4


               Nucleated Red Blood Cells %                  0.0


               Immature Granulocytes #                    0.020


               Neutrophils #                                3.4


               Lymphocytes #                                0.8


               Monocytes #                                  0.4


               Eosinophils #                                0.1


               Basophils #                                  0.0


               Nucleated Red Blood Cells #                  0.0


               Sodium Level                                 135


               Potassium Level                              3.7


               Chloride Level                               103


               Carbon Dioxide Level                          27


               Anion Gap                                      5


               Blood Urea Nitrogen                            8


               Creatinine                                  0.51


               Est Glomerular Filtrat Rate
mL/min   > 60  



               Glucose Level                                117


               Calcium Level                                8.6








Medications


Medication





Current Medications


Dextrose/Sodium Chloride 1,000 ml @  100 mls/hr Q10H IV  Last administered on 


2/24/19at 02:13; Admin Dose 100 MLS/HR;  Start 2/21/19 at 05:09


IV Flush (NS 3 ml) 3 ml PER PROTOCOL IV ;  Start 2/21/19 at 05:30


Hydromorphone HCl (Dilaudid) 0.5 mg Q4H  PRN IV .SEVERE PAIN 7-10 Last 


administered on 2/23/19at 20:26; Admin Dose 0.5 MG;  Start 2/21/19 at 05:30


Ondansetron HCl (Zofran Inj) 4 mg Q6H  PRN IV NAUSEA AND/OR VOMITING Last 


administered on 2/22/19at 21:44; Admin Dose 4 MG;  Start 2/22/19 at 11:30











TRISH KOO NP               Feb 24, 2019 09:10

## 2019-02-25 VITALS — DIASTOLIC BLOOD PRESSURE: 55 MMHG | SYSTOLIC BLOOD PRESSURE: 100 MMHG

## 2019-02-25 VITALS — HEART RATE: 72 BPM | SYSTOLIC BLOOD PRESSURE: 100 MMHG | DIASTOLIC BLOOD PRESSURE: 52 MMHG | RESPIRATION RATE: 18 BRPM

## 2019-02-25 VITALS — HEART RATE: 70 BPM | RESPIRATION RATE: 18 BRPM | DIASTOLIC BLOOD PRESSURE: 60 MMHG | SYSTOLIC BLOOD PRESSURE: 104 MMHG

## 2019-02-25 VITALS — RESPIRATION RATE: 18 BRPM | DIASTOLIC BLOOD PRESSURE: 62 MMHG | HEART RATE: 68 BPM | SYSTOLIC BLOOD PRESSURE: 101 MMHG

## 2019-02-25 VITALS — SYSTOLIC BLOOD PRESSURE: 95 MMHG | DIASTOLIC BLOOD PRESSURE: 52 MMHG | HEART RATE: 61 BPM | RESPIRATION RATE: 18 BRPM

## 2019-02-25 LAB
ADD MAN DIFF?: NO
ANION GAP: 7 (ref 5–13)
BASOPHIL #: 0 10^3/UL (ref 0–0.1)
BASOPHILS %: 0.2 % (ref 0–2)
BLOOD UREA NITROGEN: 6 MG/DL (ref 7–20)
CALCIUM: 8.7 MG/DL (ref 8.4–10.2)
CARBON DIOXIDE: 28 MMOL/L (ref 21–31)
CHLORIDE: 104 MMOL/L (ref 97–110)
CREATININE: 0.53 MG/DL (ref 0.44–1)
EOSINOPHILS #: 0.1 10^3/UL (ref 0–0.5)
EOSINOPHILS %: 2.3 % (ref 0–7)
GLUCOSE: 112 MG/DL (ref 70–220)
HEMATOCRIT: 28.3 % (ref 37–47)
HEMOGLOBIN: 9.3 G/DL (ref 12–16)
IMMATURE GRANS #M: 0.01 10^3/UL (ref 0–0.03)
IMMATURE GRANS % (M): 0.2 % (ref 0–0.43)
LYMPHOCYTES #: 0.8 10^3/UL (ref 0.8–2.9)
LYMPHOCYTES %: 17.3 % (ref 15–51)
MEAN CORPUSCULAR HEMOGLOBIN: 30.8 PG (ref 29–33)
MEAN CORPUSCULAR HGB CONC: 32.9 G/DL (ref 32–37)
MEAN CORPUSCULAR VOLUME: 93.7 FL (ref 82–101)
MEAN PLATELET VOLUME: 9.7 FL (ref 7.4–10.4)
MONOCYTE #: 0.4 10^3/UL (ref 0.3–0.9)
MONOCYTES %: 9.4 % (ref 0–11)
NEUTROPHIL #: 3.1 10^3/UL (ref 1.6–7.5)
NEUTROPHILS %: 70.6 % (ref 39–77)
NUCLEATED RED BLOOD CELLS #: 0 10^3/UL (ref 0–0)
NUCLEATED RED BLOOD CELLS%: 0 /100WBC (ref 0–0)
PLATELET COUNT: 247 10^3/UL (ref 140–415)
POTASSIUM: 3.5 MMOL/L (ref 3.5–5.1)
RED BLOOD COUNT: 3.02 10^6/UL (ref 4.2–5.4)
RED CELL DISTRIBUTION WIDTH: 14.2 % (ref 11.5–14.5)
SODIUM: 139 MMOL/L (ref 135–144)
URIC ACID: 3.2 MG/DL (ref 3.1–7.9)
WHITE BLOOD COUNT: 4.3 10^3/UL (ref 4.8–10.8)

## 2019-02-25 RX ADMIN — FOLIC ACID SCH MLS/HR: 5 INJECTION, SOLUTION INTRAMUSCULAR; INTRAVENOUS; SUBCUTANEOUS at 08:32

## 2019-02-25 RX ADMIN — ASCORBIC ACID, VITAMIN A PALMITATE, CHOLECALCIFEROL, THIAMINE HYDROCHLORIDE, RIBOFLAVIN-5 PHOSPHATE SODIUM, PYRIDOXINE HYDROCHLORIDE, NIACINAMIDE, DEXPANTHENOL, ALPHA-TOCOPHEROL ACETATE, VITAMIN K1, FOLIC ACID, BIOTIN, CYANOCOBALAMIN 1 MLS/HR: 200; 3300; 200; 6; 3.6; 6; 40; 15; 10; 150; 600; 60; 5 INJECTION, SOLUTION INTRAVENOUS at 00:40

## 2019-02-25 RX ADMIN — FOLIC ACID SCH MLS/HR: 5 INJECTION, SOLUTION INTRAMUSCULAR; INTRAVENOUS; SUBCUTANEOUS at 23:01

## 2019-02-25 RX ADMIN — IBUPROFEN SCH MG: 400 TABLET ORAL at 00:38

## 2019-02-25 RX ADMIN — POLYETHYLENE GLYCOL 3350 1 GM: 17 POWDER, FOR SOLUTION ORAL at 20:48

## 2019-02-25 RX ADMIN — ASCORBIC ACID, VITAMIN A PALMITATE, CHOLECALCIFEROL, THIAMINE HYDROCHLORIDE, RIBOFLAVIN-5 PHOSPHATE SODIUM, PYRIDOXINE HYDROCHLORIDE, NIACINAMIDE, DEXPANTHENOL, ALPHA-TOCOPHEROL ACETATE, VITAMIN K1, FOLIC ACID, BIOTIN, CYANOCOBALAMIN 1 MLS/HR: 200; 3300; 200; 6; 3.6; 6; 40; 15; 10; 150; 600; 60; 5 INJECTION, SOLUTION INTRAVENOUS at 12:23

## 2019-02-25 RX ADMIN — IBUPROFEN SCH MG: 400 TABLET ORAL at 12:22

## 2019-02-25 RX ADMIN — IBUPROFEN 1 MG: 400 TABLET, FILM COATED ORAL at 06:11

## 2019-02-25 RX ADMIN — IBUPROFEN 1 MG: 400 TABLET, FILM COATED ORAL at 12:22

## 2019-02-25 RX ADMIN — IBUPROFEN 1 MG: 400 TABLET, FILM COATED ORAL at 23:49

## 2019-02-25 RX ADMIN — FOLIC ACID SCH MLS/HR: 5 INJECTION, SOLUTION INTRAMUSCULAR; INTRAVENOUS; SUBCUTANEOUS at 00:40

## 2019-02-25 RX ADMIN — POLYETHYLENE GLYCOL 3350 SCH GM: 17 POWDER, FOR SOLUTION ORAL at 20:48

## 2019-02-25 RX ADMIN — ASCORBIC ACID, VITAMIN A PALMITATE, CHOLECALCIFEROL, THIAMINE HYDROCHLORIDE, RIBOFLAVIN-5 PHOSPHATE SODIUM, PYRIDOXINE HYDROCHLORIDE, NIACINAMIDE, DEXPANTHENOL, ALPHA-TOCOPHEROL ACETATE, VITAMIN K1, FOLIC ACID, BIOTIN, CYANOCOBALAMIN 1 MLS/HR: 200; 3300; 200; 6; 3.6; 6; 40; 15; 10; 150; 600; 60; 5 INJECTION, SOLUTION INTRAVENOUS at 23:01

## 2019-02-25 RX ADMIN — IBUPROFEN 1 MG: 400 TABLET, FILM COATED ORAL at 00:38

## 2019-02-25 RX ADMIN — ASCORBIC ACID, VITAMIN A PALMITATE, CHOLECALCIFEROL, THIAMINE HYDROCHLORIDE, RIBOFLAVIN-5 PHOSPHATE SODIUM, PYRIDOXINE HYDROCHLORIDE, NIACINAMIDE, DEXPANTHENOL, ALPHA-TOCOPHEROL ACETATE, VITAMIN K1, FOLIC ACID, BIOTIN, CYANOCOBALAMIN 1 MLS/HR: 200; 3300; 200; 6; 3.6; 6; 40; 15; 10; 150; 600; 60; 5 INJECTION, SOLUTION INTRAVENOUS at 08:32

## 2019-02-25 RX ADMIN — IBUPROFEN 1 MG: 400 TABLET, FILM COATED ORAL at 17:45

## 2019-02-25 RX ADMIN — IBUPROFEN SCH MG: 400 TABLET ORAL at 06:11

## 2019-02-25 RX ADMIN — FOLIC ACID SCH MLS/HR: 5 INJECTION, SOLUTION INTRAMUSCULAR; INTRAVENOUS; SUBCUTANEOUS at 12:23

## 2019-02-25 RX ADMIN — IBUPROFEN SCH MG: 400 TABLET ORAL at 23:49

## 2019-02-25 RX ADMIN — IBUPROFEN SCH MG: 400 TABLET ORAL at 17:45

## 2019-02-25 NOTE — PN
Date/Time of Note


Date/Time of Note


DATE: 2/25/19 


TIME: 18:58





Assessment/Plan


VTE Prophylaxis


Risk score (from Nsg)>0 risk:  3


Pharmacological prophylaxis:  other





Lines/Catheters


IV Catheter Type (from Nrsg):  Peripheral IV





Assessment/Plan


Assessment/Plan


Right foot edema


Pain in right lower extremity


Peroneal tendinosis


Posterior tibialis tendinosis


Plantar fasciitis


FHL tendinosis


OCD lesions R ankle


R foot neuroma - asymptomatic 





Plan:


Patient reports she can ambulate better today with decreased pain.  Discussed 


findings with patient to be chronic changes and recommend a CAM boot and NSAIDs 


to assist with pain.  Recommend compression therapy to assist with edema and 


pain.  No surgical plan at this time.  Patient may follow up in outpatient 


clinic.  Recommend a physical therapy consult to assist with pain, stretching 


exercises, and edema control.


Result Diagram:  


2/25/19 0450                                                                    


           2/25/19 0450





Results 24hrs





Laboratory Tests


               Test
                                2/25/19
04:50


               White Blood Count                           4.3  L


               Red Blood Count                            3.02  L


               Hemoglobin                                  9.3  L


               Hematocrit                                 28.3  L


               Mean Corpuscular Volume                     93.7


               Mean Corpuscular Hemoglobin                 30.8


               Mean Corpuscular Hemoglobin
Concent        32.9  



               Red Cell Distribution Width                 14.2


               Platelet Count                               247


               Mean Platelet Volume                         9.7


               Immature Granulocytes %                    0.200


               Neutrophils %                               70.6


               Lymphocytes %                               17.3


               Monocytes %                                  9.4


               Eosinophils %                                2.3


               Basophils %                                  0.2


               Nucleated Red Blood Cells %                  0.0


               Immature Granulocytes #                    0.010


               Neutrophils #                                3.1


               Lymphocytes #                                0.8


               Monocytes #                                  0.4


               Eosinophils #                                0.1


               Basophils #                                  0.0


               Nucleated Red Blood Cells #                  0.0


               Sodium Level                                 139


               Potassium Level                              3.5


               Chloride Level                               104


               Carbon Dioxide Level                          28


               Anion Gap                                      7


               Blood Urea Nitrogen                           6  L


               Creatinine                                  0.53


               Est Glomerular Filtrat Rate
mL/min   > 60  



               Glucose Level                                112


               Uric Acid                                    3.2


               Calcium Level                                8.7








Subjective


24 Hr Interval Summary


Free Text/Dictation


No acute events overnight.





Exam/Review of Systems


Exam


Vitals





Vital Signs


  Date      Temp  Pulse  Resp  B/P (MAP)   Pulse Ox  O2          O2 Flow    FiO2


Time                                                 Delivery    Rate


   2/25/19  97.4


     18:30


   2/25/19           70    18      104/60        99  Room Air


     14:28                           (75)








Intake and Output





2/24/19 2/24/19 2/25/19





1515:00


23:00


07:00





IntakeIntake Total


640 ml





BalanceBalance


640 ml











Exam


DP/PT pulses palpable


protective sensations intact


Pain with plantar fascia medial band and along the medial tubercle of calcaneus.


Pain along the midsubstance and distal aspect of the medial band of plantar 


fascia


Pain at the 5th metatarsal base and along the peroneal tendon


Pain at the insertion of the posterior tibialis tendon


No pain with dorsiflexion or plantarflexion


Pain with eversion and inversion


No pain to ATFL, negative anterior drawer sign


Muscle strength 5/5 in all compartments of the foot


Non-pitting edema appreciated to right foot and ankle region.





Ankle MRI


IMPRESSION:


1. Osteochondral lesion of the medial talar dome with a small focus of high-


grade chondral loss in part to bone measuring about 3 x 10 mm with mild bone 


marrow edema.


2. Tenosynovitis of the posterior tibial and flexor tendons more prominent 


involving the flexor hallucis longus tendon - moderate.


3. Mild osteoarthrosis of the talonavicular joint.


4. Thickening and scarring of the deltoid ligament which may be from prior 


injury.


5. Small to moderate tibiotalar joint effusion and small subtalar joint effusion


with a 2.3 cm ganglion cyst extending posterior to the tibiotalar and subtalar 


joints lateral to the flexor hallucis longus tendon.


6. Mild to moderate edema within the subcutaneous soft tissues around the ankle.





Foot MRI


IMPRESSION:


1. Berrios's neuromas within the second and third intermetatarsal spaces 


measuring up to 6 mm transverse.


2. Mild osteoarthrosis of the first metatarsophalangeal joint with a small to 


moderate joint effusion.


3. Mild osteoarthrosis of the tarsometatarsal joints.


4. Moderate edema within the subcutaneous soft tissues around the foot.





Ankle X-ray


IMPRESSION:


Moderate degenerative changes of the ankle and midfoot. Soft tissue swelling of 


the ankle.





Foot X-ray


IMPRESSION:


1.  No acute osseous abnormality.


2.  Degenerative changes with soft tissue swelling of the foot and ankle.





Results


Results 24hrs





Laboratory Tests


               Test
                                2/25/19
04:50


               White Blood Count                           4.3  L


               Red Blood Count                            3.02  L


               Hemoglobin                                  9.3  L


               Hematocrit                                 28.3  L


               Mean Corpuscular Volume                     93.7


               Mean Corpuscular Hemoglobin                 30.8


               Mean Corpuscular Hemoglobin
Concent        32.9  



               Red Cell Distribution Width                 14.2


               Platelet Count                               247


               Mean Platelet Volume                         9.7


               Immature Granulocytes %                    0.200


               Neutrophils %                               70.6


               Lymphocytes %                               17.3


               Monocytes %                                  9.4


               Eosinophils %                                2.3


               Basophils %                                  0.2


               Nucleated Red Blood Cells %                  0.0


               Immature Granulocytes #                    0.010


               Neutrophils #                                3.1


               Lymphocytes #                                0.8


               Monocytes #                                  0.4


               Eosinophils #                                0.1


               Basophils #                                  0.0


               Nucleated Red Blood Cells #                  0.0


               Sodium Level                                 139


               Potassium Level                              3.5


               Chloride Level                               104


               Carbon Dioxide Level                          28


               Anion Gap                                      7


               Blood Urea Nitrogen                           6  L


               Creatinine                                  0.53


               Est Glomerular Filtrat Rate
mL/min   > 60  



               Glucose Level                                112


               Uric Acid                                    3.2


               Calcium Level                                8.7








Medications


Medication





Current Medications


Dextrose/Sodium Chloride 1,000 ml @  100 mls/hr Q10H IV  Last administered on 


2/25/19at 12:23; Admin Dose 100 MLS/HR;  Start 2/21/19 at 05:09


IV Flush (NS 3 ml) 3 ml PER PROTOCOL IV ;  Start 2/21/19 at 05:30


Hydromorphone HCl (Dilaudid) 0.5 mg Q4H  PRN IV .SEVERE PAIN 7-10 Last 


administered on 2/23/19at 20:26; Admin Dose 0.5 MG;  Start 2/21/19 at 05:30


Ondansetron HCl (Zofran Inj) 4 mg Q6H  PRN IV NAUSEA AND/OR VOMITING Last 


administered on 2/22/19at 21:44; Admin Dose 4 MG;  Start 2/22/19 at 11:30


Ibuprofen (Motrin) 400 mg Q6 PO  Last administered on 2/25/19at 17:45; Admin 


Dose 400 MG;  Start 2/24/19 at 12:00


Polyethylene Glycol (Miralax) 17 gm BID GTB ;  Start 2/25/19 at 21:00











WILLIAM ODWNEY DPM             Feb 25, 2019 18:58

## 2019-02-25 NOTE — PN
Date/Time of Note


Date/Time of Note


DATE: 2/25/19 


TIME: 11:38





Assessment/Plan


VTE Prophylaxis


Risk score (from Ns)>0 risk:  3


SCD applied (from Ns):  Yes


Pharmacological prophylaxis:  NA/contraindicated


Pharm contraindication:  low risk/ambulating





Lines/Catheters


IV Catheter Type (from [facility]):  Peripheral IV





Assessment/Plan


Hospital Course


SUBJECTIVE: Patient with no abdominal pain.  She is tolerating diet.  Patient 


continues to have right heel pain and is getting an MRI today.








OBJECTIVE: Vital signs-see below








PHYSICAL EXAM:


Constitutional: Well-developed, adequately built, lying in bed comfortably.


Psych:  nl mood/affect, no complaints


Head:  atraumatic, normocephalic


Eyes:  nl conjunctiva, nl sclera


ENMT:  mucosa pink and moist, nl external ears & nose


Neck:  non-tender, supple


Respiratory:  clear to auscultation, normal air movement


Cardiovascular:  nl pulses, regular rate and rhythm


Gastrointestinal: Soft, nontender, no rebound tenderness, bowel sounds active in


all 4 quadrants.


Musculoskeletal/extremities: +Tenderness heel R>L, nl extremities to inspection,


motor strength equal bilaterally, no focal deficit. Normal pulses,no cyanosis, 


no edema.


Neurological: Alert oriented 3,nl speech, nl strength


Skin:  nl turgor





ASSESSMENT/PLAN: 68-year-old female with a history of multiple abdominal 


surgeries in the past, recurrent small bowel obstructions, here with abdominal 


pain, found to have partial small bowel obstruction.





1.  Partial small bowel obstruction, recurrent.


-Resolving.  Bowel series with no evidence of obstruction.  Surgery managing and


plan is to DC NG tube and start on a clear diet and observe for another 24 


hours.





2.  Bilateral heel pain,R>L, worse with walking, likely plantar fasciitis


-Case reviewed by podiatry.  Plan is to get further imaging with MRI.


-Continue NSAIDs, supportive care and elevation.





3.  Anemia, likely chronic.


-Stable H&H.  No overt bleeding.  Continue to monitor.





DVT prophylaxis: SCDs


PUD prophylaxis: Protonix


CODE STATUS: Full code


Diet: Clear diet, advance per surgery





Disposition: Overall, patient with improving symptoms and is getting ready for 


discharge.  However she is with right heel pain requiring further imaging 


studies including MRI.  We will follow-up podiatry recommendations.





Patient was seen in collaboration with 


Result Diagram:  


2/25/19 0450                                                                    


           2/25/19 0450





Results 24hrs





Laboratory Tests


               Test
                                2/25/19
04:50


               White Blood Count                           4.3  L


               Red Blood Count                            3.02  L


               Hemoglobin                                  9.3  L


               Hematocrit                                 28.3  L


               Mean Corpuscular Volume                     93.7


               Mean Corpuscular Hemoglobin                 30.8


               Mean Corpuscular Hemoglobin
Concent        32.9  



               Red Cell Distribution Width                 14.2


               Platelet Count                               247


               Mean Platelet Volume                         9.7


               Immature Granulocytes %                    0.200


               Neutrophils %                               70.6


               Lymphocytes %                               17.3


               Monocytes %                                  9.4


               Eosinophils %                                2.3


               Basophils %                                  0.2


               Nucleated Red Blood Cells %                  0.0


               Immature Granulocytes #                    0.010


               Neutrophils #                                3.1


               Lymphocytes #                                0.8


               Monocytes #                                  0.4


               Eosinophils #                                0.1


               Basophils #                                  0.0


               Nucleated Red Blood Cells #                  0.0


               Sodium Level                                 139


               Potassium Level                              3.5


               Chloride Level                               104


               Carbon Dioxide Level                          28


               Anion Gap                                      7


               Blood Urea Nitrogen                           6  L


               Creatinine                                  0.53


               Est Glomerular Filtrat Rate
mL/min   > 60  



               Glucose Level                                112


               Uric Acid                                    3.2


               Calcium Level                                8.7








Exam/Review of Systems


Exam


Vitals





Vital Signs


  Date      Temp  Pulse  Resp  B/P (MAP)   Pulse Ox  O2          O2 Flow    FiO2


Time                                                 Delivery    Rate


   2/25/19  98.0     68    18      101/62        99  Room Air


     08:32                           (75)








Intake and Output





2/24/19 2/24/19 2/25/19





1515:00


23:00


07:00





IntakeIntake Total


640 ml





BalanceBalance


640 ml














Results


Results 24hrs





Laboratory Tests


               Test
                                2/25/19
04:50


               White Blood Count                           4.3  L


               Red Blood Count                            3.02  L


               Hemoglobin                                  9.3  L


               Hematocrit                                 28.3  L


               Mean Corpuscular Volume                     93.7


               Mean Corpuscular Hemoglobin                 30.8


               Mean Corpuscular Hemoglobin
Concent        32.9  



               Red Cell Distribution Width                 14.2


               Platelet Count                               247


               Mean Platelet Volume                         9.7


               Immature Granulocytes %                    0.200


               Neutrophils %                               70.6


               Lymphocytes %                               17.3


               Monocytes %                                  9.4


               Eosinophils %                                2.3


               Basophils %                                  0.2


               Nucleated Red Blood Cells %                  0.0


               Immature Granulocytes #                    0.010


               Neutrophils #                                3.1


               Lymphocytes #                                0.8


               Monocytes #                                  0.4


               Eosinophils #                                0.1


               Basophils #                                  0.0


               Nucleated Red Blood Cells #                  0.0


               Sodium Level                                 139


               Potassium Level                              3.5


               Chloride Level                               104


               Carbon Dioxide Level                          28


               Anion Gap                                      7


               Blood Urea Nitrogen                           6  L


               Creatinine                                  0.53


               Est Glomerular Filtrat Rate
mL/min   > 60  



               Glucose Level                                112


               Uric Acid                                    3.2


               Calcium Level                                8.7








Medications


Medication





Current Medications


Dextrose/Sodium Chloride 1,000 ml @  100 mls/hr Q10H IV  Last administered on 2/ 25/19at 00:40; Admin Dose 100 MLS/HR;  Start 2/21/19 at 05:09


IV Flush (NS 3 ml) 3 ml PER PROTOCOL IV ;  Start 2/21/19 at 05:30


Hydromorphone HCl (Dilaudid) 0.5 mg Q4H  PRN IV .SEVERE PAIN 7-10 Last a


dministered on 2/23/19at 20:26; Admin Dose 0.5 MG;  Start 2/21/19 at 05:30


Ondansetron HCl (Zofran Inj) 4 mg Q6H  PRN IV NAUSEA AND/OR VOMITING Last 


administered on 2/22/19at 21:44; Admin Dose 4 MG;  Start 2/22/19 at 11:30


Ibuprofen (Motrin) 400 mg Q6 PO  Last administered on 2/25/19at 06:11; Admin 


Dose 400 MG;  Start 2/24/19 at 12:00


Polyethylene Glycol (Miralax) 17 gm BID GTB ;  Start 2/25/19 at 21:00











TRISH KOO NP               Feb 25, 2019 11:39

## 2019-02-26 VITALS — HEART RATE: 67 BPM | SYSTOLIC BLOOD PRESSURE: 98 MMHG | RESPIRATION RATE: 18 BRPM | DIASTOLIC BLOOD PRESSURE: 53 MMHG

## 2019-02-26 VITALS — HEART RATE: 70 BPM | SYSTOLIC BLOOD PRESSURE: 109 MMHG | DIASTOLIC BLOOD PRESSURE: 56 MMHG | RESPIRATION RATE: 16 BRPM

## 2019-02-26 VITALS — HEART RATE: 65 BPM | SYSTOLIC BLOOD PRESSURE: 102 MMHG | RESPIRATION RATE: 16 BRPM | DIASTOLIC BLOOD PRESSURE: 50 MMHG

## 2019-02-26 LAB
ADD MAN DIFF?: NO
ANION GAP: 9 (ref 5–13)
BASOPHIL #: 0 10^3/UL (ref 0–0.1)
BASOPHILS %: 0.3 % (ref 0–2)
BLOOD UREA NITROGEN: 11 MG/DL (ref 7–20)
CALCIUM: 9 MG/DL (ref 8.4–10.2)
CARBON DIOXIDE: 26 MMOL/L (ref 21–31)
CHLORIDE: 106 MMOL/L (ref 97–110)
CREATININE: 0.56 MG/DL (ref 0.44–1)
EOSINOPHILS #: 0.1 10^3/UL (ref 0–0.5)
EOSINOPHILS %: 3 % (ref 0–7)
GLUCOSE: 93 MG/DL (ref 70–220)
HEMATOCRIT: 29.3 % (ref 37–47)
HEMOGLOBIN: 9.4 G/DL (ref 12–16)
IMMATURE GRANS #M: 0.01 10^3/UL (ref 0–0.03)
IMMATURE GRANS % (M): 0.3 % (ref 0–0.43)
LYMPHOCYTES #: 0.8 10^3/UL (ref 0.8–2.9)
LYMPHOCYTES %: 23.4 % (ref 15–51)
MEAN CORPUSCULAR HEMOGLOBIN: 30.2 PG (ref 29–33)
MEAN CORPUSCULAR HGB CONC: 32.1 G/DL (ref 32–37)
MEAN CORPUSCULAR VOLUME: 94.2 FL (ref 82–101)
MEAN PLATELET VOLUME: 9.9 FL (ref 7.4–10.4)
MONOCYTE #: 0.2 10^3/UL (ref 0.3–0.9)
MONOCYTES %: 6.1 % (ref 0–11)
NEUTROPHIL #: 2.2 10^3/UL (ref 1.6–7.5)
NEUTROPHILS %: 66.9 % (ref 39–77)
NUCLEATED RED BLOOD CELLS #: 0 10^3/UL (ref 0–0)
NUCLEATED RED BLOOD CELLS%: 0 /100WBC (ref 0–0)
PLATELET COUNT: 262 10^3/UL (ref 140–415)
POTASSIUM: 4 MMOL/L (ref 3.5–5.1)
RED BLOOD COUNT: 3.11 10^6/UL (ref 4.2–5.4)
RED CELL DISTRIBUTION WIDTH: 14.2 % (ref 11.5–14.5)
SODIUM: 141 MMOL/L (ref 135–144)
WHITE BLOOD COUNT: 3.3 10^3/UL (ref 4.8–10.8)

## 2019-02-26 RX ADMIN — IBUPROFEN 1 MG: 400 TABLET, FILM COATED ORAL at 05:37

## 2019-02-26 RX ADMIN — POLYETHYLENE GLYCOL 3350 1 GM: 17 POWDER, FOR SOLUTION ORAL at 08:35

## 2019-02-26 RX ADMIN — FOLIC ACID SCH MLS/HR: 5 INJECTION, SOLUTION INTRAMUSCULAR; INTRAVENOUS; SUBCUTANEOUS at 15:09

## 2019-02-26 RX ADMIN — ASCORBIC ACID, VITAMIN A PALMITATE, CHOLECALCIFEROL, THIAMINE HYDROCHLORIDE, RIBOFLAVIN-5 PHOSPHATE SODIUM, PYRIDOXINE HYDROCHLORIDE, NIACINAMIDE, DEXPANTHENOL, ALPHA-TOCOPHEROL ACETATE, VITAMIN K1, FOLIC ACID, BIOTIN, CYANOCOBALAMIN 1 MLS/HR: 200; 3300; 200; 6; 3.6; 6; 40; 15; 10; 150; 600; 60; 5 INJECTION, SOLUTION INTRAVENOUS at 15:09

## 2019-02-26 RX ADMIN — POLYETHYLENE GLYCOL 3350 SCH GM: 17 POWDER, FOR SOLUTION ORAL at 08:35

## 2019-02-26 RX ADMIN — IBUPROFEN 1 MG: 400 TABLET, FILM COATED ORAL at 11:19

## 2019-02-26 RX ADMIN — IBUPROFEN SCH MG: 400 TABLET ORAL at 05:37

## 2019-02-26 RX ADMIN — IBUPROFEN SCH MG: 400 TABLET ORAL at 11:19

## 2019-02-26 NOTE — DS
Date/Time of Note


Date/Time of Note


DATE: 2/26/19 


TIME: 12:32





Discharge Summary


Admission/Discharge Info


Admit Date/Time


Feb 21, 2019 at 04:27


Discharge Date/Time





Discharge Diagnosis


Recurrent partial small bowel obstruction.  Resolved


Right foot tenosynovitis, plantar fasciitis.  Stable.


Chronic anemia


Patient Condition:  Stable


Consults


Dr. Arevalo, surgery


, podiatry


Procedures


2/21/ 2019.  CT abdomen and pelvis.


IMPRESSION: 


1.  Again noted are dilated loops of small bowel in the left abdomen and central


lower abdomen with more normal caliber proximal and distal small bowel 


consistent with partial closed loop obstruction.


2.  Diverticulosis sigmoid colon without CT evidence diverticulitis. No evidence


of appendicitis.


3.  Evidence of previous abdominal surgery. Status post hysterectomy. 


4.  Cholelithiasis with an otherwise unremarkable distended gallbladder. No 


biliary ductal dilation.


5.  No evidence of calcified urinary calculi or obstructive uropathy.


6.  Hepatic fatty infiltration.





2/22/2019.  CT abdomen and pelvis


IMPRESSION:     


 


1.  There is an NG tube noted with distal portion of the gastric fundus.


 


2.  Oral contrast material noted in the stomach and proximal small bowel. The 


opacified proximal small bowel loops do not appear dilated, representing 


improvement when compared to 02/21/2019.


 


3.  There are persistent dilated small bowel loops in the mid abdomen containing


fecalized content, which are unchanged. This suggests stasis secondary to 


persistent partial closed loop obstruction.  Consider delayed imaging to allow 


for transit of oral contrast material through the remainder of the bowel.


 


4.  Ventral abdominal hernia, containing fat., unchanged.


 


5.  Mild atelectasis in the dependent portions of the lower lobes.  This has 


worsened slightly when compared to the previous study.


 


 





2/23/2019.  Abdominal series


IMPRESSION:


 


1.  No evidence of mechanical bowel obstruction or intra-abdominal free air.





2/25 /2019.  MRI of the right foot


IMPRESSION:


 


1. Berrios's neuromas within the second and third intermetatarsal spaces 


measuring up to 6 mm transverse.


 


2. Mild osteoarthrosis of the first metatarsophalangeal joint with a small to 


moderate joint effusion.


 


3. Mild osteoarthrosis of the tarsometatarsal joints.


 


4. Moderate edema within the subcutaneous soft tissues around the foot.





2/25/2019.  MRI of right ankle.


IMPRESSION:


 


1. Osteochondral lesion of the medial talar dome with a small focus of high-


grade chondral loss in part to bone measuring about 3 x 10 mm with mild bone 


marrow edema.


 


2. Tenosynovitis of the posterior tibial and flexor tendons more prominent 


involving the flexor hallucis longus tendon - moderate.


 


3. Mild osteoarthrosis of the talonavicular joint.


 


4. Thickening and scarring of the deltoid ligament which may be from prior 


injury.


 


5. Small to moderate tibiotalar joint effusion and small subtalar joint effusion


with a 2.3 cm ganglion cyst extending posterior to the tibiotalar and subtalar 


joints lateral to the flexor hallucis longus tendon.


 


6. Mild to moderate edema within the subcutaneous soft tissues around the ankle.


Hospital Course


X68-year-old female with a history of multiple abdominal surgeries in the past, 


recurrent small bowel obstructions, here with abdominal pain, found to have 


partial small bowel obstruction.





Patient was treated conservatively with medical management, NG tube 


decompression.  She was being followed by surgery.  Repeat imaging showed 


resolution of small bowel obstruction.  There was no further indication for 


surgical approach.  Patient was started on a diet which she was able to 


tolerate.  She had regular bowel movement and was passing flatus.  She was 


medically cleared from a surgical standpoint for discharge.  Hospital stay was 


also noted for worsening pain on right heel for which patient had podiatry 


follow-up.  Repeated imagings done with no evidence of osteomyelitis.  Patient 


was noted to have plantar fasciitis, FHL tendinosis, PT tendinosis, peroneal 


tendinosis, OCD lesions right ankle, likely culprit of the pain.  She was also 


noted with a symptomatic right foot neuroma.  Podiatry recommended medical 


management on NSAIDs,CAM boots and outpatient follow-up.  Patient felt much 


improvement in her pain status and she was able to tolerate abduction without 


any difficulties.  At this time, patient is medically stable for discharge with 


outpatient [facility] wound clinic follow-up.





Approximately 60 m spent on coordinating the discharge on this patient.





Patient was seen in collaboration with Dr.MELISSA


Home Meds


Active Scripts


Ibuprofen* (Ibuprofen*) 200 Mg Capsule, 200 MG PO Q12, #14 CAP


   Prov:TRISH KOO NP         2/26/19


Reported Medications


Omega-3 Fatty Acids/Fish Oil (Fish Oil 1,000 mg Capsule) 1 Each Capsule, 1 EACH 


PO DAILY, CAP


   2/21/19


Multivitamins* (Theragran*) 1 Tab Tab, 1 TAB PO DAILY, TAB


   7/13/18


Discontinued Reported Medications


Glucosamine Sulfate 2KCL (GLUCOSAMINE) 1,000 Mg Tablet, 1000 MG PO DAILY, TAB


   2/21/19


Alendronate Sodium* (Fosamax*) 70 Mg Tablet, 70 MG PO QFRIDAY, #4 TAB


   7/13/18


Docusate Sodium* (Docusate Sodium*) 100 Mg Capsule, 100 MG PO BID, #60 CAP


   7/13/18


Follow-up Plan


Follow-up with primary care physician in 1 week


Follow-up with [facility] wound clinic next week.


Primary Care Provider


Jorge Parrish


Pending Labs





Laboratory Tests


         Test
                                            2/26/19
05:46


         White Blood Count
                      3.3 10^3/ul
(4.8-10.8)


         Red Blood Count
                      3.11 10^6/ul
(4.20-5.40)


         Hemoglobin
                               9.4 g/dl
(12.0-16.0)


         Hematocrit
                                 29.3 %
(37.0-47.0)


         Mean Corpuscular Volume
                  94.2 fl
(82.0-101.0)


         Mean Corpuscular Hemoglobin
               30.2 pg
(29.0-33.0)


         Mean Corpuscular Hemoglobin
Concent      32.1 g/dl
(32.0-37.0)


         Red Cell Distribution Width
                14.2 %
(11.5-14.5)


         Platelet Count
                          262 10^3/UL
(140-415)


         Mean Platelet Volume
                        9.9 fl
(7.4-10.4)


         Immature Granulocytes %
                 0.300 %
(0.001-0.429)


         Neutrophils %
                              66.9 %
(39.0-77.0)


         Lymphocytes %
                              23.4 %
(15.0-51.0)


         Monocytes %
                                  6.1 %
(0.0-11.0)


         Eosinophils %
                                 3.0 %
(0.0-7.0)


         Basophils %
                                   0.3 %
(0.0-2.0)


         Nucleated Red Blood Cells %
             0.0 /100WBC
(0.0-0.0)


         Immature Granulocytes #
             0.010 10^3/ul
(0.0-0.031)


         Neutrophils #
                           2.2 10^3/ul
(1.6-7.5)


         Lymphocytes #
                           0.8 10^3/ul
(0.8-2.9)


         Monocytes #
                             0.2 10^3/ul
(0.3-0.9)


         Eosinophils #
                           0.1 10^3/ul
(0.0-0.5)


         Basophils #
                             0.0 10^3/ul
(0.0-0.1)


         Nucleated Red Blood Cells #
             0.0 10^3/ul
(0.0-0.0)


         Sodium Level
                             141 mmol/L
(135-144)


         Potassium Level
                          4.0 mmol/L
(3.5-5.1)


         Chloride Level
                            106 mmol/L
()


         Carbon Dioxide Level
                        26 mmol/L
(21-31)


         Anion Gap                                            9 (5-13)


         Blood Urea Nitrogen
                           11 mg/dl
(7-20)


         Creatinine
                             0.56 mg/dl
(0.44-1.00)


         Est Glomerular Filtrat Rate
mL/min   > 60 mL/min
(>60)


         Glucose Level
                               93 mg/dl
()


         Calcium Level
                            9.0 mg/dl
(8.4-10.2)














TRISH KOO NP               Feb 26, 2019 12:35

## 2019-02-26 NOTE — PDOCDIS
Discharge Instructions


CONDITION


                [id number]
Patient Condition:  [id number]
Stable








HOME CARE INSTRUCTIONS:


                [id number]
Diet Instructions:  [id number]
Regular








FOLLOW UP/APPOINTMENTS


Follow-up Plan


Follow-up with primary care physician in 1 week


Follow-up with [facility] wound clinic next week.











TRISH KOO NP               Feb 26, 2019 12:27

## 2019-05-30 ENCOUNTER — HOSPITAL ENCOUNTER (INPATIENT)
Dept: HOSPITAL 10 - E/R | Age: 69
LOS: 2 days | Discharge: HOME | DRG: 390 | End: 2019-06-01
Attending: FAMILY MEDICINE | Admitting: FAMILY MEDICINE
Payer: COMMERCIAL

## 2019-05-30 ENCOUNTER — HOSPITAL ENCOUNTER (INPATIENT)
Dept: HOSPITAL 91 - E/R | Age: 69
LOS: 2 days | Discharge: HOME | DRG: 390 | End: 2019-06-01
Payer: COMMERCIAL

## 2019-05-30 VITALS — RESPIRATION RATE: 16 BRPM | SYSTOLIC BLOOD PRESSURE: 92 MMHG | DIASTOLIC BLOOD PRESSURE: 54 MMHG | HEART RATE: 65 BPM

## 2019-05-30 VITALS — SYSTOLIC BLOOD PRESSURE: 103 MMHG | DIASTOLIC BLOOD PRESSURE: 57 MMHG | RESPIRATION RATE: 20 BRPM | HEART RATE: 64 BPM

## 2019-05-30 VITALS
WEIGHT: 138.23 LBS | HEIGHT: 62 IN | HEIGHT: 62 IN | BODY MASS INDEX: 25.44 KG/M2 | WEIGHT: 138.23 LBS | BODY MASS INDEX: 25.44 KG/M2

## 2019-05-30 VITALS — SYSTOLIC BLOOD PRESSURE: 110 MMHG | HEART RATE: 65 BPM | DIASTOLIC BLOOD PRESSURE: 62 MMHG | RESPIRATION RATE: 20 BRPM

## 2019-05-30 DIAGNOSIS — K80.20: ICD-10-CM

## 2019-05-30 DIAGNOSIS — K57.90: ICD-10-CM

## 2019-05-30 DIAGNOSIS — D64.9: ICD-10-CM

## 2019-05-30 DIAGNOSIS — M81.0: ICD-10-CM

## 2019-05-30 DIAGNOSIS — K56.609: Primary | ICD-10-CM

## 2019-05-30 DIAGNOSIS — I70.90: ICD-10-CM

## 2019-05-30 LAB
ADD MAN DIFF?: NO
ADD UMIC: NO
ALANINE AMINOTRANSFERASE: 17 IU/L (ref 13–69)
ALBUMIN/GLOBULIN RATIO: 1.22
ALBUMIN: 4.4 G/DL (ref 3.3–4.9)
ALKALINE PHOSPHATASE: 60 IU/L (ref 42–121)
ANION GAP: 9 (ref 5–13)
ASPARTATE AMINO TRANSFERASE: 25 IU/L (ref 15–46)
BASOPHIL #: 0 10^3/UL (ref 0–0.1)
BASOPHILS %: 0.3 % (ref 0–2)
BILIRUBIN,DIRECT: 0 MG/DL (ref 0–0.2)
BILIRUBIN,TOTAL: 0.8 MG/DL (ref 0.2–1.3)
BLOOD UREA NITROGEN: 23 MG/DL (ref 7–20)
CALCIUM: 9.5 MG/DL (ref 8.4–10.2)
CARBON DIOXIDE: 28 MMOL/L (ref 21–31)
CHLORIDE: 102 MMOL/L (ref 97–110)
CREATININE: 0.67 MG/DL (ref 0.44–1)
EOSINOPHILS #: 0 10^3/UL (ref 0–0.5)
EOSINOPHILS %: 0.5 % (ref 0–7)
GLOBULIN: 3.6 G/DL (ref 1.3–3.2)
GLUCOSE: 132 MG/DL (ref 70–220)
HEMATOCRIT: 35.1 % (ref 37–47)
HEMOGLOBIN: 11.5 G/DL (ref 12–16)
IMMATURE GRANS #M: 0.02 10^3/UL (ref 0–0.03)
IMMATURE GRANS % (M): 0.3 % (ref 0–0.43)
LIPASE: 59 U/L (ref 23–300)
LYMPHOCYTES #: 0.7 10^3/UL (ref 0.8–2.9)
LYMPHOCYTES %: 11 % (ref 15–51)
MEAN CORPUSCULAR HEMOGLOBIN: 30.3 PG (ref 29–33)
MEAN CORPUSCULAR HGB CONC: 32.8 G/DL (ref 32–37)
MEAN CORPUSCULAR VOLUME: 92.4 FL (ref 82–101)
MEAN PLATELET VOLUME: 9.9 FL (ref 7.4–10.4)
MONOCYTE #: 0.3 10^3/UL (ref 0.3–0.9)
MONOCYTES %: 5.7 % (ref 0–11)
NEUTROPHIL #: 4.9 10^3/UL (ref 1.6–7.5)
NEUTROPHILS %: 82.2 % (ref 39–77)
NUCLEATED RED BLOOD CELLS #: 0 10^3/UL (ref 0–0)
NUCLEATED RED BLOOD CELLS%: 0 /100WBC (ref 0–0)
PLATELET COUNT: 277 10^3/UL (ref 140–415)
POTASSIUM: 4.2 MMOL/L (ref 3.5–5.1)
RED BLOOD COUNT: 3.8 10^6/UL (ref 4.2–5.4)
RED CELL DISTRIBUTION WIDTH: 14.6 % (ref 11.5–14.5)
SODIUM: 139 MMOL/L (ref 135–144)
TOTAL PROTEIN: 8 G/DL (ref 6.1–8.1)
UR ASCORBIC ACID: NEGATIVE MG/DL
UR BILIRUBIN (DIP): NEGATIVE MG/DL
UR BLOOD (DIP): NEGATIVE MG/DL
UR CLARITY: CLEAR
UR COLOR: (no result)
UR GLUCOSE (DIP): NEGATIVE MG/DL
UR KETONES (DIP): (no result) MG/DL
UR LEUKOCYTE ESTERASE (DIP): NEGATIVE LEU/UL
UR NITRITE (DIP): NEGATIVE MG/DL
UR PH (DIP): 7 (ref 5–9)
UR SPECIFIC GRAVITY (DIP): 1.03 (ref 1–1.03)
UR TOTAL PROTEIN (DIP): NEGATIVE MG/DL
UR UROBILINOGEN (DIP): NEGATIVE MG/DL
WHITE BLOOD COUNT: 5.9 10^3/UL (ref 4.8–10.8)

## 2019-05-30 PROCEDURE — 80053 COMPREHEN METABOLIC PANEL: CPT

## 2019-05-30 PROCEDURE — 87045 FECES CULTURE AEROBIC BACT: CPT

## 2019-05-30 PROCEDURE — 36415 COLL VENOUS BLD VENIPUNCTURE: CPT

## 2019-05-30 PROCEDURE — 87177 OVA AND PARASITES SMEARS: CPT

## 2019-05-30 PROCEDURE — 96361 HYDRATE IV INFUSION ADD-ON: CPT

## 2019-05-30 PROCEDURE — 87086 URINE CULTURE/COLONY COUNT: CPT

## 2019-05-30 PROCEDURE — 74018 RADEX ABDOMEN 1 VIEW: CPT

## 2019-05-30 PROCEDURE — 74019 RADEX ABDOMEN 2 VIEWS: CPT

## 2019-05-30 PROCEDURE — 74250 X-RAY XM SM INT 1CNTRST STD: CPT

## 2019-05-30 PROCEDURE — 81003 URINALYSIS AUTO W/O SCOPE: CPT

## 2019-05-30 PROCEDURE — 83036 HEMOGLOBIN GLYCOSYLATED A1C: CPT

## 2019-05-30 PROCEDURE — 85025 COMPLETE CBC W/AUTO DIFF WBC: CPT

## 2019-05-30 PROCEDURE — 96374 THER/PROPH/DIAG INJ IV PUSH: CPT

## 2019-05-30 PROCEDURE — 80048 BASIC METABOLIC PNL TOTAL CA: CPT

## 2019-05-30 PROCEDURE — 74177 CT ABD & PELVIS W/CONTRAST: CPT

## 2019-05-30 PROCEDURE — 83735 ASSAY OF MAGNESIUM: CPT

## 2019-05-30 PROCEDURE — 83690 ASSAY OF LIPASE: CPT

## 2019-05-30 PROCEDURE — 96376 TX/PRO/DX INJ SAME DRUG ADON: CPT

## 2019-05-30 PROCEDURE — 96375 TX/PRO/DX INJ NEW DRUG ADDON: CPT

## 2019-05-30 PROCEDURE — 99285 EMERGENCY DEPT VISIT HI MDM: CPT

## 2019-05-30 RX ADMIN — VASOPRESSIN 1 ML/SEC: 20 INJECTION, SOLUTION INTRAMUSCULAR; SUBCUTANEOUS at 06:49

## 2019-05-30 RX ADMIN — IOHEXOL 1 ML: 300 INJECTION, SOLUTION INTRAVENOUS at 06:48

## 2019-05-30 RX ADMIN — HYDROMORPHONE HYDROCHLORIDE 1 MG: 1 INJECTION, SOLUTION INTRAMUSCULAR; INTRAVENOUS; SUBCUTANEOUS at 08:01

## 2019-05-30 RX ADMIN — FOLIC ACID SCH MLS/HR: 5 INJECTION, SOLUTION INTRAMUSCULAR; INTRAVENOUS; SUBCUTANEOUS at 15:00

## 2019-05-30 RX ADMIN — THIAMINE HYDROCHLORIDE 1 MLS/HR: 100 INJECTION, SOLUTION INTRAMUSCULAR; INTRAVENOUS at 15:00

## 2019-05-30 RX ADMIN — ONDANSETRON HYDROCHLORIDE 1 MG: 2 INJECTION, SOLUTION INTRAMUSCULAR; INTRAVENOUS at 08:00

## 2019-05-30 RX ADMIN — ONDANSETRON HYDROCHLORIDE 1 MG: 2 INJECTION, SOLUTION INTRAMUSCULAR; INTRAVENOUS at 05:53

## 2019-05-30 RX ADMIN — THIAMINE HYDROCHLORIDE 1 MLS/HR: 100 INJECTION, SOLUTION INTRAMUSCULAR; INTRAVENOUS at 05:53

## 2019-05-30 NOTE — CONS
Assessment/Plan


Assessment/Plan


Assessment/Plan (Daily)


Resolving small bowel obstruction


Abdominal series in a.m.  If improved, would DC NG tube and start clears.


Seems to be resolving as abdomen is less distended and is passing flatus.


Continue n.p.o. for now





Consultation Date/Type/Reason


Admit Date/Time


May 30, 2019 at 08:17


Date/Time of Note


DATE: 19 


TIME: 19:41





Hx of Present Illness


The patient is a 68-year-old female with multiple recurrent small bowel obstr


uctions in the past.  These all resolved conservatively.  She was recently 


hospitalized in February for similar symptoms.





Developed acute onset of crampy abdominal pain with nausea.  Patient states her 


symptoms began yesterday.  She describes her pain is 10 out of 10.  Mostly 


localized to the left lower quadrant.  However, the daughter feels that she has 


been having the symptoms for more than a week.  She presented to the ER this 


morning.  Her work-up was consistent with a small bowel obstruction.  I was 


called for consultation.





She is status post 7 abdominal operations.








She denies fevers or chills.  She did have a small bowel movement yesterday.  


She states that she did pass flatus today.


14 point review of systems was performed.  Pertinent negatives and positive per 


HPI.





Past Medical History


Medical History:  other (Chronic anemia)


Home Meds


Reported Medications


Polyethylene Glycol* (Polyethylene Glycol*) 17 Gm Powd.pack, 17 GM PO DAILY for 


30 Days, #30


   19


Alendronate Sodium* (Fosamax*) 70 Mg Tablet, 70 MG PO Q7D for QTHURSDAY for 60 


Days


   19


Omega-3 Fatty Acids/Fish Oil (Fish Oil 1,000 mg Capsule) 1 Each Capsule, 1 EACH 


PO DAILY, CAP


   19


Multivitamins* (Theragran*) 1 Tab Tab, 1 TAB PO DAILY, TAB


   18


Discontinued Scripts


Ibuprofen* (Ibuprofen*) 200 Mg Capsule, 200 MG PO Q12, #14 CAP


   Prov:TRISH KOO NP         19


Medications





Current Medications


Sodium Chloride 1,000 ml @  75 mls/hr [id number] IV  Last administered on 19at


15:00; Admin Dose 75 MLS/HR;  Start 19 at 14:27


IV Flush (NS 3 ml) 3 ml PER PROTOCOL IV ;  Start 19 at 14:30


Ondansetron HCl (Zofran Inj) 4 mg Q6H  PRN IV NAUSEA/VOMITING;  Start 19 at


14:30


Acetaminophen (Tylenol Tab) 650 mg Q6H  PRN PO .PAIN 1-3 OR TEMP;  Start 19


at 14:30


Morphine Sulfate (morphine) 2 mg Q4H  PRN IV .SEVERE PAIN 7-10;  Start 19 


at 14:30


Allergies:  


Coded Allergies:  


     No Known Allergies (Unverified  Allergy, Mild, 19)





Past Surgical History


Past Surgical Hx:  appendectomy, other (Multiple abdominal surgeries)





Family History


Significant Family History:  no pertinent family hx





Social History


Alcohol Use:  rarely


Smoking Status:  Never smoker





Exam/Review of Systems


Exam


Vitals





Vital Signs


  Date      Temp  Pulse  Resp  B/P (MAP)   Pulse Ox  O2          O2 Flow    FiO2


Time                                                 Delivery    Rate


   19  98.8     65    16  92/54 (67)        95


     19:29


   19                                           Room Air


     12:38





Constitutional:  alert, oriented


Psych:  no complaints, nl mood/affect


Head:  normocephalic


ENMT:  nl external ears & nose


Neck:  supple


Respiratory:  clear to auscultation, normal air movement


Cardiovascular:  regular rate and rhythm


Gastrointestinal:  soft, non-tender


Extremities:  normal pulses


Neurological:  CNS II-XII intact, nl mental status


Skin:  nl turgor, rash or lesions


Lymph:  nl lymph nodes





Results


Result Diagram:  


19 0518                                                                    


           19 0518





Results 24hrs





Laboratory Tests


       Test
                                19
05:18  19
07:50


       White Blood Count                           5.9  #


       Red Blood Count                           3.80  #L


       Hemoglobin                                11.5  #L


       Hematocrit                                 35.1  L


       Mean Corpuscular Volume                     92.4


       Mean Corpuscular Hemoglobin                 30.3


       Mean Corpuscular Hemoglobin
Concent        32.8  
  



       Red Cell Distribution Width                14.6  H


       Platelet Count                               277


       Mean Platelet Volume                         9.9


       Immature Granulocytes %                    0.300


       Neutrophils %                              82.2  H


       Lymphocytes %                              11.0  L


       Monocytes %                                  5.7


       Eosinophils %                                0.5


       Basophils %                                  0.3


       Nucleated Red Blood Cells %                  0.0


       Immature Granulocytes #                    0.020


       Neutrophils #                                4.9


       Lymphocytes #                               0.7  L


       Monocytes #                                  0.3


       Eosinophils #                                0.0


       Basophils #                                  0.0


       Nucleated Red Blood Cells #                  0.0


       Sodium Level                                 139


       Potassium Level                              4.2


       Chloride Level                               102


       Carbon Dioxide Level                          28


       Anion Gap                                      9


       Blood Urea Nitrogen                          23  H


       Creatinine                                  0.67


       Est Glomerular Filtrat Rate
mL/min   > 60  
        



       Glucose Level                                132


       Calcium Level                                9.5


       Total Bilirubin                              0.8


       Direct Bilirubin                            0.00


       Indirect Bilirubin                           0.8


       Aspartate Amino Transf
(AST/SGOT)            25  
  



       Alanine Aminotransferase
(ALT/SGPT)          17  
  



       Alkaline Phosphatase                          60


       Total Protein                                8.0


       Albumin                                      4.4


       Globulin                                   3.60  H


       Albumin/Globulin Ratio                      1.22


       Lipase                                        59


       Urine Color                                         STRAW


       Urine Clarity                                       CLEAR


       Urine pH                                                    7.0


       Urine Specific Gravity                                    1.029


       Urine Ketones                                       TRACE  A


       Urine Nitrite                                       NEGATIVE


       Urine Bilirubin                                     NEGATIVE


       Urine Urobilinogen                                  NEGATIVE


       Urine Leukocyte Esterase                            NEGATIVE


       Urine Hemoglobin                                    NEGATIVE


       Urine Glucose                                       NEGATIVE


       Urine Total Protein                                 NEGATIVE








Imaging


Imaging


 Patient: AMINA RAND   : 1950   Age: 68  Sex: F                        


       MR #:    [id number]   Acct #:   [id number]    DOS: 19 0552


Ordering MD: SABINO SALDIVAR MD   Location:  E/R   Room/Bed:                  


                         


                                        


PROCEDURE:   CT Abdomen and Pelvis with contrast. 


 


CLINICAL INDICATION:   Abdominal pain.


 


TECHNIQUE:   CT scan of the abdomen and pelvis with contrast was performed 


utilizing axial tomographic images from the domes the diaphragm to the symphysis


pubis.  The patient was scanned post  uncomplicated intravenous administration 


of 100 cc of Omnipaque-300.  Coronal and sagittal reformatted images were 


obtained from the axial source images. Images were reviewed on a high-resolution


PACS workstation. The total exam CTDI equals 7.35 mGy and the total exam DLP 


equals 419.94 mGy-cm.  One or more of the following dose reduction techniques w


ere used:  Automated exposure control, adjustment of the mA and / or kV 


according to patient size, or use of iterative reconstruction technique.  DICOM 


images are available.


 


COMPARISON:   CT 2019 


 


FINDINGS:


 


The lung bases demonstrate mild bilateral basilar atelectatic changes.   The 


liver is normal in size and contour.  No focal intrahepatic masses are 


identified.  There is no intra or extrahepatic biliary dilatation.  The 


gallbladder contains stones.  The spleen, pancreas, and adrenal glands are 


unremarkable.  


 


The kidneys are symmetric in size and demonstrate normal enhancement.  There is 


mild left renal pelviectasis. No renal parenchymal mass is identified.  The 


urinary bladder is unremarkable.


 


There are multiple surgical clips within the right abdomen. The appendix is not 


visualized. There are multiple moderately distended fluid-filled loops of small 


bowel with focal transition point in the midline pelvis. There is decompression 


of the distal ileum. There is moderate volume stool throughout the colon.  


Diverticula are seen scattered throughout the colon without evidence of 


diverticulitis.  The appendix is normal in appearance.  The uterus is surgically


absent.  No intraperitoneal free fluid, free air or abscess identified. No 


retroperitoneal, mesenteric, or inguinal adenopathy is identified. There are 


multiple small ventral wall fascial defects with herniation of fat.


 


The abdominal aorta and major branching vessels are normal in caliber and 


demonstrate vascular calcifications.  The osseous structures demonstrate an old 


fracture deformity of the right inferior pubic ramus. There is a chronic 


moderate to severe compression fracture of the L5 vertebral body.   No 


significant subcutaneous soft tissue abnormality is identified.


 


IMPRESSION:


 


 


1.  Multiple moderately distended fluid-filled loops of small bowel with focal 


transition point at the level of the distal ileum in the midline pelvis. 


Findings are concerning for small bowel obstruction.


2.  Colonic diverticulosis.


3.  Cholelithiasis.


4.  Mild left renal pelviectasis.


5.  Status post hysterectomy.


6.  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


7.  Old fracture deformity of the right inferior pubic ramus.


8.  Chronic moderate to severe compression fracture of the L5 vertebral body.


9.  Arterial atherosclerosis.


 


  


RPTAT: HH


_____________________________________________ 


.Martha Christian MD, MD           Date    Time 


Electronically viewed and signed by .Martha Christian MD, MD on 2019 


07:31





Medications


Medication





Current Medications


Sodium Chloride 1,000 ml @  75 mls/hr [id number] IV  Last administered on 19at


15:00; Admin Dose 75 MLS/HR;  Start 19 at 14:27


IV Flush (NS 3 ml) 3 ml PER PROTOCOL IV ;  Start 19 at 14:30


Ondansetron HCl (Zofran Inj) 4 mg Q6H  PRN IV NAUSEA/VOMITING;  Start 19 at


14:30


Acetaminophen (Tylenol Tab) 650 mg Q6H  PRN PO .PAIN 1-3 OR TEMP;  Start 19


at 14:30


Morphine Sulfate (morphine) 2 mg Q4H  PRN IV .SEVERE PAIN 7-10;  Start 19 


at 14:30











AYESHA SHEARER MD           May 30, 2019 19:45

## 2019-05-30 NOTE — ERD
ER Documentation


Chief Complaint


Chief Complaint





LUQ AP, N/V X 1 DAY





HPI


This is a very pleasant 68-year-old female presents to the emergency department 


the past surgical history of total abdominal hysterectomy.  The patient indicate


s that she is undergone 7 abdominal surgeries.  She states this was due to 


abnormal findings that were suggestive of carcinoma several years ago.  Since 


that time she is had multiple small bowel obstructions.  She indicates that for 


the past 24 hours she has been having severe abdominal pain.  The patient states


the pain began in the left upper quadrant now radiates to the left lower 


quadrant.  The pain is 10 out of 10 in intensity.  She had one episode of 


nonbloody nonbilious emesis.  She has not experience flatulence but did states 


she had one bowel movement yesterday.  She indicates that the pain has been 


persistent and progressively worsened.  It is now 10 out of 10 in intensity with


mild abdominal distention's.  She has had no fevers no shaking no chills.  She 


denies any chest pain.  She has no shortness of breath.  She states she had 


multiple similar episodes of this pain in the past which is been diagnosed with 


small bowel obstructions.





ROS


All systems reviewed and are negative except as per history of present illness.





Medications


Home Meds


Reported Medications


Polyethylene Glycol* (Polyethylene Glycol*) 17 Gm Powd.pack, 17 GM PO DAILY for 


30 Days, #30


   19


Alendronate Sodium* (Fosamax*) 70 Mg Tablet, 70 MG PO Q7D for QTHURSDAY for 60 


Days


   19


Omega-3 Fatty Acids/Fish Oil (Fish Oil 1,000 mg Capsule) 1 Each Capsule, 1 EACH 


PO DAILY, CAP


   19


Multivitamins* (Theragran*) 1 Tab Tab, 1 TAB PO DAILY, TAB


   18


Discontinued Scripts


Ibuprofen* (Ibuprofen*) 200 Mg Capsule, 200 MG PO Q12, #14 CAP


   Prov:TRISH KOO NP         19





Allergies


Allergies:  


Coded Allergies:  


     No Known Allergies (Unverified  Allergy, Mild, 19)





PMhx/Soc


History of Surgery:  Yes (hysterectomy , SBO repair)


Anesthesia Reaction:  No


Hx Neurological Disorder:  No


Hx Respiratory Disorders:  No


Hx Cardiac Disorders:  No


Hx Psychiatric Problems:  No


Hx Miscellaneous Medical Probl:  Yes (CERVICAL CANCER , SBO, OSTEOPROSIS, RT 


FOOT EDEMA, RLE PAIN , PLANTAR FASCI)


Hx Alcohol Use:  No


Hx Substance Use:  No


Hx Tobacco Use:  No


Smoking Status:  Never smoker





Physical Exam


Vitals





Vital Signs


  Date      Temp  Pulse  Resp  B/P (MAP)   Pulse Ox  O2          O2 Flow    FiO2


Time                                                 Delivery    Rate


   19  97.2     90    20      127/79        99


     04:21                           (95)





Physical Exam


Constitutional:Well-developed. Well-nourished.  Patient appears to be in 


discomfort secondary to pain.


HEENT:Normocephalic. Atraumatic.Pupils were equal round reactive to light. Moist


mucous membranes.No tonsillar exudates.


Neck: No nuchal rigidity. No lymphadenopathy. No posterior cervical spine 


tenderness or step-offs.


Respiratory: Not using accessory muscles of respiration.Lungs were clear to 


auscultation bilaterally. No rhonchi. No rales. No wheezing. 


Cardiovascular: Regular rate regular rhythm.No murmurs. No rubs were 


appreciated.S1, S2 normal. Distal pulses are palpable 2+ bilaterally.


GI: Abdomen was soft. Nontender.  Mild distention.  No pulsatile abdominal 


masses or bruits. No rebound. No guarding. Bowel sounds were hyperactive.


Muscle skeletal: Full range of motion of both the upper and lower extremities 


bilaterally.Normal muscle tone.No assymetrical calf tenderness or swelling. 


Skin: No petechia, no purpura. No lesions on the palms or the soles of the feet.


No maculopapular rash.


NEURO: Patient was alert, awake, orientated x3.No facial droop. Gait observed 


and normal with no ataxia.Speech had regular rate and rhythm. No focal 


neurological deficits.


Result Diagram:  


1918





Results 24 hrs





Laboratory Tests


      Test
                                 19
05:18    19
07:50


      White Blood Count                      5.9 10^3/ul


      Red Blood Count                       3.80 10^6/ul


      Hemoglobin                               11.5 g/dl


      Hematocrit                                  35.1 %


      Mean Corpuscular Volume                    92.4 fl


      Mean Corpuscular Hemoglobin                30.3 pg


      Mean Corpuscular Hemoglobin
Concent     32.8 g/dl 
  



      Red Cell Distribution Width                 14.6 %


      Platelet Count                         277 10^3/UL


      Mean Platelet Volume                        9.9 fl


      Immature Granulocytes %                    0.300 %


      Neutrophils %                               82.2 %


      Lymphocytes %                               11.0 %


      Monocytes %                                  5.7 %


      Eosinophils %                                0.5 %


      Basophils %                                  0.3 %


      Nucleated Red Blood Cells %            0.0 /100WBC


      Immature Granulocytes #              0.020 10^3/ul


      Neutrophils #                          4.9 10^3/ul


      Lymphocytes #                          0.7 10^3/ul


      Monocytes #                            0.3 10^3/ul


      Eosinophils #                          0.0 10^3/ul


      Basophils #                            0.0 10^3/ul


      Nucleated Red Blood Cells #            0.0 10^3/ul


      Sodium Level                            139 mmol/L


      Potassium Level                         4.2 mmol/L


      Chloride Level                          102 mmol/L


      Carbon Dioxide Level                     28 mmol/L


      Anion Gap                                        9


      Blood Urea Nitrogen                       23 mg/dl


      Creatinine                              0.67 mg/dl


      Est Glomerular Filtrat Rate
mL/min   > 60 mL/min 
   



      Glucose Level                            132 mg/dl


      Calcium Level                            9.5 mg/dl


      Total Bilirubin                          0.8 mg/dl


      Direct Bilirubin                        0.00 mg/dl


      Indirect Bilirubin                       0.8 mg/dl


      Aspartate Amino Transf
(AST/SGOT)         25 IU/L 
  



      Alanine Aminotransferase
(ALT/SGPT)       17 IU/L 
  



      Alkaline Phosphatase                       60 IU/L


      Total Protein                             8.0 g/dl


      Albumin                                   4.4 g/dl


      Globulin                                 3.60 g/dl


      Albumin/Globulin Ratio                        1.22


      Lipase                                      59 U/L


      Urine Color                                          STRAW


      Urine Clarity                                        CLEAR


      Urine pH                                                        7.0


      Urine Specific Gravity                                        1.029


      Urine Ketones                                        TRACE mg/dL


      Urine Nitrite                                        NEGATIVE mg/dL


      Urine Bilirubin                                      NEGATIVE mg/dL


      Urine Urobilinogen                                   NEGATIVE mg/dL


      Urine Leukocyte Esterase
            
               NEGATIVE
Davide/ul


      Urine Hemoglobin                                     NEGATIVE mg/dL


      Urine Glucose                                        NEGATIVE mg/dL


      Urine Total Protein                                  NEGATIVE mg/dl





Current Medications


 Medications
   Dose
          Sig/Stephanie
       Start Time
   Status  Last


 (Trade)       Ordered        Route
 PRN     Stop Time              Admin
Dose


                              Reason                                Admin


 Sodium         1,000 ml @ 
   Q1H STAT
      19       DC           19


Chloride       1,000 mls/hr   IV
            05:48
                       05:53



                                             19 06:47


 Morphine       4 mg           ONCE  STAT
    19       DC           19


Sulfate
                      IV
            05:48
                       05:53



(morphine)                                   19 05:49


 Ondansetron    4 mg           ONCE  STAT
    19       DC           19


HCl
  (Zofran                 IV
            05:48
                       05:53



Inj)                                         19 05:49


 Sodium         100 ml @ ud    STK-MED        19       DC           19


Chloride                      ONCE
 .ROUTE
  06:39
                       06:49



                                             19 06:40


 Iohexol
       150 ml         STK-MED        19       DC           19


(Omnipaque                    ONCE
 .ROUTE
  06:40
                       06:48



300mg/
 ml)                                  19 06:41


                1 mg           ONCE  STAT
    19       DC           19


Hydromorphone                 IV
            07:36
                       08:01



HCl
                                         19 07:38


(Dilaudid)


 Ondansetron    4 mg           ONCE  STAT
    19       DC           19


HCl
  (Zofran                 IV
            07:36
                       08:00



Inj)                                         19 07:38








Procedures/MDM


This patient presented to the emergency department with abdominal pain and was 


seen and evaluated by myself. My differential diagnosis included but was not 


limited to abdominal aortic aneurysm, appendicitis, pancreatitis, perforated 


peptic ulcer, perforated viscus, Boerhaave's syndrome or visceral pain such as 


diverticulitis, DKA, esophagitis, hepatitis or bowel obstruction.





The patient was placed on a cardiac monitor, continuous pulse oximetry, and IV 


access was established by nursing staff.  The patient was given intravenous 


morphine and Zofran for analgesic control.  The patient's physical exam findings


were concerning for small bowel obstruction.  Therefore obtained a CT scan of 


the abdomen which was reviewed by the radiologist myself and indicate the 


followin.  Multiple moderately distended fluid-filled loops of small bowel with focal 


transition point at the level of the distal ileum in the midline pelvis. 


Findings are concerning for small bowel obstruction.


2.  Colonic diverticulosis.


3.  Cholelithiasis.


4.  Mild left renal pelviectasis.


5.  Status post hysterectomy.


6.  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


7.  Old fracture deformity of the right inferior pubic ramus.


8.  Chronic moderate to severe compression fracture of the L5 vertebral body.


9.  Arterial atherosclerosis.





The patient received a surgical consult Dr. Henning kindly see the patient.  The 


patient will be admitted to the hospitalist Dr. Turner.  I placed an NG tube and 


afterwards confirm that the NG tube is in good placement by using a Otilia s


yringe to hear that gastric sounds were present.  Post radiograph indicated that


NG tube was in good position.





Critical Care:


Time: 55 minutes


Treatments/Evaluations: Close monitoring and treatment of unstable vital signs, 


cardiorespiratory, and neurologic status, while maintaining tight balance of 


fluid, respiratory, and cardiac interventions.  Time does not include performing


any of the above billable procedures.





Departure


Diagnosis:  


   Primary Impression:  


   SBO (small bowel obstruction)


Condition:  SABINO Mustafa MD            May 30, 2019 06:36

## 2019-05-30 NOTE — HP
Date/Time of Note


Date/Time of Note


DATE: 5/30/19 


TIME: 14:40





Assessment/Plan


VTE Prophylaxis


SCD applied (from Nsg):  Yes


Pharmacological prophylaxis:  NA/contraindicated


Pharm contraindication:  low risk/ambulating





Lines/Catheters


IV Catheter Type (from Nrsg):  Saline Lock





Assessment/Plan


Hospital Course


SUBJECTIVE: Lying in bed comfortably.  Complaining of abdominal pain which is 


improved from before.





OBJECTIVE: Vital signs-see below








PHYSICAL EXAM:


Constitutional: Adequately built,not in acute distress.


HEENT: Head atraumatic and normocephalic. Eyes: Extraocular muscles  intact. 


Anicteric sclerae. Pupils equal bilaterally, reactive to light.


NECK: Supple without lymph node.


CHEST: Clear and good breath sounds equally. No wheezing. No rhonchi.


HEART: S1, S2. Regular rate and rhythm.


ABDOMEN: Slight tenderness to all 4 quadrants, no rebound tenderness.  


Hyperactive bowel sounds.  No distention.


EXTREMITIES:  No cyanosis, clubbing or edema.


NEUROLOGIC: Alert and oriented x3. No focal deficit. No sensory deficit.


PSYCHOSOCIAL: No signs of depression.


INTEGUMENTARY: No open wounds.





ASSESSMENT AND PLAN:





SUBJECTIVE: Patient with no abdominal pain.  She has been passing flatus.  No 


bowel movement today.  Patient now complains of bilateral heel pain more on 


right, get worse when she walks.








OBJECTIVE: Vital signs-see below








PHYSICAL EXAM:


Constitutional: Well-developed, adequately built, lying in bed comfortably.


Psych:  nl mood/affect, no complaints


Head:  atraumatic, normocephalic


Eyes:  nl conjunctiva, nl sclera


ENMT:  mucosa pink and moist, nl external ears & nose


Neck:  non-tender, supple


Respiratory:  clear to auscultation, normal air movement


Cardiovascular:  nl pulses, regular rate and rhythm


Gastrointestinal: Soft, nontender, no rebound tenderness, bowel sounds active in


all 4 quadrants.


Musculoskeletal/extremities: +Tenderness heel R>L, nl extremities to inspection,


motor strength equal bilaterally, no focal deficit. Normal pulses,no cyanosis, 


no edema.


Neurological: Alert oriented 3,nl speech, nl strength


Skin:  nl turgor





ASSESSMENT/PLAN: 68-year-old female with a history of multiple abdominal 


surgeries in the past, recurrent small bowel obstructions, here with abdominal 


pain, found to have SBO again





Recurrent small bowel obstruction


-She seems quite stable.  At this time, we will continue NG tube 


decompression/LIS.  Obtain a small bowel follow-through x-ray.


-Bowel rest/pain control


-Follow-up surgeon's recommendations.





Chronic anemia


-Stable H&H








DVT prophylaxis: SCDs





Rest of the management depend on clinical course.








Approximately 60 m spent on this history and physical.








Patient was seen in collaboration with 


Result Diagram:  


5/30/19 0518                                                                    


           5/30/19 0518





Results 24hrs





Laboratory Tests


       Test
                                5/30/19
05:18  5/30/19
07:50


       White Blood Count                           5.9  #


       Red Blood Count                           3.80  #L


       Hemoglobin                                11.5  #L


       Hematocrit                                 35.1  L


       Mean Corpuscular Volume                     92.4


       Mean Corpuscular Hemoglobin                 30.3


       Mean Corpuscular Hemoglobin
Concent        32.8  
  



       Red Cell Distribution Width                14.6  H


       Platelet Count                               277


       Mean Platelet Volume                         9.9


       Immature Granulocytes %                    0.300


       Neutrophils %                              82.2  H


       Lymphocytes %                              11.0  L


       Monocytes %                                  5.7


       Eosinophils %                                0.5


       Basophils %                                  0.3


       Nucleated Red Blood Cells %                  0.0


       Immature Granulocytes #                    0.020


       Neutrophils #                                4.9


       Lymphocytes #                               0.7  L


       Monocytes #                                  0.3


       Eosinophils #                                0.0


       Basophils #                                  0.0


       Nucleated Red Blood Cells #                  0.0


       Sodium Level                                 139


       Potassium Level                              4.2


       Chloride Level                               102


       Carbon Dioxide Level                          28


       Anion Gap                                      9


       Blood Urea Nitrogen                          23  H


       Creatinine                                  0.67


       Est Glomerular Filtrat Rate
mL/min   > 60  
        



       Glucose Level                                132


       Calcium Level                                9.5


       Total Bilirubin                              0.8


       Direct Bilirubin                            0.00


       Indirect Bilirubin                           0.8


       Aspartate Amino Transf
(AST/SGOT)            25  
  



       Alanine Aminotransferase
(ALT/SGPT)          17  
  



       Alkaline Phosphatase                          60


       Total Protein                                8.0


       Albumin                                      4.4


       Globulin                                   3.60  H


       Albumin/Globulin Ratio                      1.22


       Lipase                                        59


       Urine Color                                         STRAW


       Urine Clarity                                       CLEAR


       Urine pH                                                    7.0


       Urine Specific Gravity                                    1.029


       Urine Ketones                                       TRACE  A


       Urine Nitrite                                       NEGATIVE


       Urine Bilirubin                                     NEGATIVE


       Urine Urobilinogen                                  NEGATIVE


       Urine Leukocyte Esterase                            NEGATIVE


       Urine Hemoglobin                                    NEGATIVE


       Urine Glucose                                       NEGATIVE


       Urine Total Protein                                 NEGATIVE








HPI/ROS


Admit Date/Time


Admit Date/Time


May 30, 2019 at 08:17





Hx of Present Illness


This a 68-year-old female with a history of multiple abdominal surgeries in the 


past with recurrent small bowel obstructions, chronic anemia, admitted this 


morning with worsening abdominal pain started last night. She was also feeling 


nauseated with one episode of nonbilious/nonbloody vomiting.  She denied fever, 


chills.  She is not sure if she has been having constipation.  She felt like 


passing liquid/water to her rectum at the time when she urged to go to [facility].  In 


the emergency room, vital signs stable, CBC/BMP stable.  Patient's abdominal CT 


showed multiple moderately distended fluid-filled loops of small bowel with 


focal transition point at the level of the distal ileum in the midline pelvis, 


concerning for small bowel obstruction.  There was also evidence of colonic 


diverticulosis without evidence of diverticulitis.  There was also evidence of 


cholelithiasis without evidence of cholecystitis.  Patient was given pain 


medications and a surgical consult with Dr. Arevalo was called from the emergency


room and was admitted.





At my encounter with the patient, her pain is much improved.  She did not feel 


like passing flatus.  She is currently not having any nausea or vomiting.  No 


fevers.  Denies chest pain, palpitation, shortness of breath or other 


constitutional symptoms.





ROS


12 point review of system was assessed and is negative other than what is 


mentioned in the HPI





PMH/Family/Social


Past Medical History


See HPI


Medications





Current Medications


Sodium Chloride 1,000 ml @  75 mls/hr [id number] IV ;  Start 5/30/19 at 14:27;  


Status UNV


IV Flush (NS 3 ml) 3 ml PER PROTOCOL IV ;  Start 5/30/19 at 14:30;  Status UNV


Ondansetron HCl (Zofran Inj) 4 mg Q6H  PRN IV NAUSEA/VOMITING;  Start 5/30/19 at


14:30;  Status UNV


Acetaminophen (Tylenol Tab) 650 mg Q6H  PRN PO .PAIN 1-3 OR TEMP;  Start 5/30/19


at 14:30;  Status UNV


Morphine Sulfate (morphine) 2 mg Q4H  PRN IV .SEVERE PAIN 7-10;  Start 5/30/19 


at 14:30;  Status UNV


Coded Allergies:  


     No Known Allergies (Unverified  Allergy, Mild, 5/30/19)





Past Surgical History


See HPI


Past Surgical Hx:  appendectomy, other





Family History


Significant Family History:  no pertinent family hx





Social History


Denied history of alcohol, smoking or illicit drug use


Smoking Status:  Never smoker





Exam/Review of Systems


Vital Signs


Vitals





Vital Signs


  Date      Temp  Pulse  Resp  B/P (MAP)   Pulse Ox  O2          O2 Flow    FiO2


Time                                                 Delivery    Rate


   5/30/19  98.5     64    20      103/57        97


     12:59                           (72)


   5/30/19                                           Room Air


     12:38














TRISH KOO NP               May 30, 2019 14:52

## 2019-05-31 VITALS — SYSTOLIC BLOOD PRESSURE: 94 MMHG | DIASTOLIC BLOOD PRESSURE: 53 MMHG | RESPIRATION RATE: 18 BRPM | HEART RATE: 70 BPM

## 2019-05-31 VITALS — DIASTOLIC BLOOD PRESSURE: 57 MMHG | HEART RATE: 71 BPM | RESPIRATION RATE: 16 BRPM | SYSTOLIC BLOOD PRESSURE: 102 MMHG

## 2019-05-31 VITALS — RESPIRATION RATE: 18 BRPM | HEART RATE: 72 BPM | DIASTOLIC BLOOD PRESSURE: 60 MMHG | SYSTOLIC BLOOD PRESSURE: 101 MMHG

## 2019-05-31 VITALS — DIASTOLIC BLOOD PRESSURE: 50 MMHG | SYSTOLIC BLOOD PRESSURE: 92 MMHG | RESPIRATION RATE: 16 BRPM | HEART RATE: 68 BPM

## 2019-05-31 LAB
ADD MAN DIFF?: NO
ANION GAP: 5 (ref 5–13)
BASOPHIL #: 0 10^3/UL (ref 0–0.1)
BASOPHILS %: 0.5 % (ref 0–2)
BLOOD UREA NITROGEN: 14 MG/DL (ref 7–20)
CALCIUM: 8.2 MG/DL (ref 8.4–10.2)
CARBON DIOXIDE: 27 MMOL/L (ref 21–31)
CHLORIDE: 107 MMOL/L (ref 97–110)
CREATININE: 0.59 MG/DL (ref 0.44–1)
EOSINOPHILS #: 0.1 10^3/UL (ref 0–0.5)
EOSINOPHILS %: 1.2 % (ref 0–7)
GLUCOSE: 89 MG/DL (ref 70–220)
HEMATOCRIT: 32 % (ref 37–47)
HEMOGLOBIN A1C: 5.6 % (ref 0–5.9)
HEMOGLOBIN: 10.3 G/DL (ref 12–16)
IMMATURE GRANS #M: 0.02 10^3/UL (ref 0–0.03)
IMMATURE GRANS % (M): 0.5 % (ref 0–0.43)
LYMPHOCYTES #: 0.8 10^3/UL (ref 0.8–2.9)
LYMPHOCYTES %: 19.6 % (ref 15–51)
MAGNESIUM: 2 MG/DL (ref 1.7–2.5)
MEAN CORPUSCULAR HEMOGLOBIN: 30.6 PG (ref 29–33)
MEAN CORPUSCULAR HGB CONC: 32.2 G/DL (ref 32–37)
MEAN CORPUSCULAR VOLUME: 95 FL (ref 82–101)
MEAN PLATELET VOLUME: 10 FL (ref 7.4–10.4)
MONOCYTE #: 0.3 10^3/UL (ref 0.3–0.9)
MONOCYTES %: 6.8 % (ref 0–11)
NEUTROPHIL #: 3.1 10^3/UL (ref 1.6–7.5)
NEUTROPHILS %: 71.4 % (ref 39–77)
NUCLEATED RED BLOOD CELLS #: 0 10^3/UL (ref 0–0)
NUCLEATED RED BLOOD CELLS%: 0 /100WBC (ref 0–0)
PLATELET COUNT: 248 10^3/UL (ref 140–415)
POTASSIUM: 3.9 MMOL/L (ref 3.5–5.1)
RED BLOOD COUNT: 3.37 10^6/UL (ref 4.2–5.4)
RED CELL DISTRIBUTION WIDTH: 14.9 % (ref 11.5–14.5)
SODIUM: 139 MMOL/L (ref 135–144)
WHITE BLOOD COUNT: 4.3 10^3/UL (ref 4.8–10.8)

## 2019-05-31 RX ADMIN — THIAMINE HYDROCHLORIDE 1 MLS/HR: 100 INJECTION, SOLUTION INTRAMUSCULAR; INTRAVENOUS at 17:07

## 2019-05-31 RX ADMIN — FOLIC ACID SCH MLS/HR: 5 INJECTION, SOLUTION INTRAMUSCULAR; INTRAVENOUS; SUBCUTANEOUS at 17:07

## 2019-05-31 RX ADMIN — FOLIC ACID SCH MLS/HR: 5 INJECTION, SOLUTION INTRAMUSCULAR; INTRAVENOUS; SUBCUTANEOUS at 04:50

## 2019-05-31 RX ADMIN — FOLIC ACID SCH MLS/HR: 5 INJECTION, SOLUTION INTRAMUSCULAR; INTRAVENOUS; SUBCUTANEOUS at 22:50

## 2019-05-31 RX ADMIN — THIAMINE HYDROCHLORIDE 1 MLS/HR: 100 INJECTION, SOLUTION INTRAMUSCULAR; INTRAVENOUS at 22:50

## 2019-05-31 RX ADMIN — THIAMINE HYDROCHLORIDE 1 MLS/HR: 100 INJECTION, SOLUTION INTRAMUSCULAR; INTRAVENOUS at 04:50

## 2019-05-31 NOTE — PN
Date/Time of Note


Date/Time of Note


DATE: 5/31/19 


TIME: 14:14





Assessment/Plan


VTE Prophylaxis


Risk score (from Ns)>0 risk:  2


SCD applied (from Ns):  Yes


Pharmacological prophylaxis:  NA/contraindicated


Pharm contraindication:  low risk/ambulating





Lines/Catheters


IV Catheter Type (from [facility]):  Peripheral IV





Assessment/Plan


Hospital Course


SUBJECTIVE: Doing well.  Patient with no further abdominal pain.  She has not 


had a bowel movement.  Nasogastric tube drained 50 mL overnight.





OBJECTIVE: Vital signs-see below








PHYSICAL EXAM:


Constitutional: Adequately built,not in acute distress.


HEENT: Head atraumatic and normocephalic. Eyes: Extraocular muscles  intact. 


Anicteric sclerae. Pupils equal bilaterally, reactive to light.


NECK: Supple without lymph node.


CHEST: Clear and good breath sounds equally. No wheezing. No rhonchi.


HEART: S1, S2. Regular rate and rhythm.


ABDOMEN: Slight tenderness to all 4 quadrants, no rebound tenderness.  


Hyperactive bowel sounds.  No distention.


EXTREMITIES:  No cyanosis, clubbing or edema.


NEUROLOGIC: Alert and oriented x3. No focal deficit. No sensory deficit.


PSYCHOSOCIAL: No signs of depression.


INTEGUMENTARY: No open wounds.





ASSESSMENT AND PLAN:





SUBJECTIVE: Patient with no abdominal pain.  She has been passing flatus.  No 


bowel movement today.  Patient now complains of bilateral heel pain more on 


right, get worse when she walks.








OBJECTIVE: Vital signs-see below








PHYSICAL EXAM:


Constitutional: Well-developed, adequately built, lying in bed comfortably.


Psych:  nl mood/affect, no complaints


Head:  atraumatic, normocephalic


Eyes:  nl conjunctiva, nl sclera


ENMT:  mucosa pink and moist, nl external ears & nose


Neck:  non-tender, supple


Respiratory:  clear to auscultation, normal air movement


Cardiovascular:  nl pulses, regular rate and rhythm


Gastrointestinal: Soft, nontender, no rebound tenderness, bowel sounds active in


all 4 quadrants.


Musculoskeletal/extremities: +Tenderness heel R>L, nl extremities to inspection,


motor strength equal bilaterally, no focal deficit. Normal pulses,no cyanosis, 


no edema.


Neurological: Alert oriented 3,nl speech, nl strength


Skin:  nl turgor





ASSESSMENT/PLAN: 68-year-old female with a history of multiple abdominal 


surgeries in the past, recurrent small bowel obstructions, here with abdominal 


pain, found to have SBO again





Recurrent small bowel obstruction


-SBFT with resolving SBO.  Patient with improved abdominal pain.  NG tube drain 


total 50 mL overnight.  No BM yet


-Follow surgical recommendations regarding NG tube discontinuation and starting 


diet whenever appropriate.





Chronic anemia


-Stable H&H








DVT prophylaxis: SCDs





Disposition: Overall patient with improving SBO.  Await for further surgical 


recommendation regarding NG tube discontinuation and starting on a diet when 


appropriate.





Patient was seen in collaboration with 


Result Diagram:  


5/31/19 0430 5/31/19 0430





Results 24hrs





Laboratory Tests


               Test
                                5/31/19
04:30


               White Blood Count                          4.3  #L


               Red Blood Count                            3.37  L


               Hemoglobin                                 10.3  L


               Hematocrit                                 32.0  L


               Mean Corpuscular Volume                     95.0


               Mean Corpuscular Hemoglobin                 30.6


               Mean Corpuscular Hemoglobin
Concent        32.2  



               Red Cell Distribution Width                14.9  H


               Platelet Count                               248


               Mean Platelet Volume                        10.0


               Immature Granulocytes %                   0.500  H


               Neutrophils %                               71.4


               Lymphocytes %                               19.6


               Monocytes %                                  6.8


               Eosinophils %                                1.2


               Basophils %                                  0.5


               Nucleated Red Blood Cells %                  0.0


               Immature Granulocytes #                    0.020


               Neutrophils #                                3.1


               Lymphocytes #                                0.8


               Monocytes #                                  0.3


               Eosinophils #                                0.1


               Basophils #                                  0.0


               Nucleated Red Blood Cells #                  0.0


               Sodium Level                                 139


               Potassium Level                              3.9


               Chloride Level                               107


               Carbon Dioxide Level                          27


               Anion Gap                                      5


               Blood Urea Nitrogen                          14  #


               Creatinine                                  0.59


               Est Glomerular Filtrat Rate
mL/min   > 60  



               Glucose Level                                89  #


               Hemoglobin A1c                               5.6


               Calcium Level                               8.2  L


               Magnesium Level                              2.0








Exam/Review of Systems


Exam


Vitals





Vital Signs


  Date      Temp  Pulse  Resp  B/P (MAP)   Pulse Ox  O2          O2 Flow    FiO2


Time                                                 Delivery    Rate


   5/31/19  98.0     70    18  94/53 (67)        99


     08:36


   5/30/19                                           Room Air


     12:38








Intake and Output





5/30/19 5/30/19 5/31/19





1515:00


23:00


07:00





IntakeIntake Total


1150 ml





OutputOutput Total


50 ml





BalanceBalance


1100 ml














Results


Results 24hrs





Laboratory Tests


               Test
                                5/31/19
04:30


               White Blood Count                          4.3  #L


               Red Blood Count                            3.37  L


               Hemoglobin                                 10.3  L


               Hematocrit                                 32.0  L


               Mean Corpuscular Volume                     95.0


               Mean Corpuscular Hemoglobin                 30.6


               Mean Corpuscular Hemoglobin
Concent        32.2  



               Red Cell Distribution Width                14.9  H


               Platelet Count                               248


               Mean Platelet Volume                        10.0


               Immature Granulocytes %                   0.500  H


               Neutrophils %                               71.4


               Lymphocytes %                               19.6


               Monocytes %                                  6.8


               Eosinophils %                                1.2


               Basophils %                                  0.5


               Nucleated Red Blood Cells %                  0.0


               Immature Granulocytes #                    0.020


               Neutrophils #                                3.1


               Lymphocytes #                                0.8


               Monocytes #                                  0.3


               Eosinophils #                                0.1


               Basophils #                                  0.0


               Nucleated Red Blood Cells #                  0.0


               Sodium Level                                 139


               Potassium Level                              3.9


               Chloride Level                               107


               Carbon Dioxide Level                          27


               Anion Gap                                      5


               Blood Urea Nitrogen                          14  #


               Creatinine                                  0.59


               Est Glomerular Filtrat Rate
mL/min   > 60  



               Glucose Level                                89  #


               Hemoglobin A1c                               5.6


               Calcium Level                               8.2  L


               Magnesium Level                              2.0








Medications


Medication





Current Medications


Sodium Chloride 1,000 ml @  75 mls/hr [id number] IV  Last administered on 5/31/19at


04:50; Admin Dose 75 MLS/HR;  Start 5/30/19 at 14:27


IV Flush (NS 3 ml) 3 ml PER PROTOCOL IV ;  Start 5/30/19 at 14:30


Ondansetron HCl (Zofran Inj) 4 mg Q6H  PRN IV NAUSEA/VOMITING;  Start 5/30/19 at


14:30


Acetaminophen (Tylenol Tab) 650 mg Q6H  PRN PO .PAIN 1-3 OR TEMP;  Start 5/30/19


at 14:30


Morphine Sulfate (morphine) 2 mg Q4H  PRN IV .SEVERE PAIN 7-10;  Start 5/30/19 


at 14:30











TRISH KOO NP               May 31, 2019 14:16

## 2019-05-31 NOTE — PN
Date/Time of Note


Date/Time of Note


DATE: 5/31/19 


TIME: 18:11





Assessment/Plan


Lines/Catheters


IV Catheter Type (from Nrsg):  Peripheral IV





Assessment/Plan


Assessment/Plan


Resolving SBO


Start clears


Soft diet in am


If tolerates, d/c home in pm





Subjective


24 Hr Interval Summary


Constitutional:  no complaints, BM


Feeding:  advancing diet


Pain Control:  well controlled





Exam/Review of Systems


Vital Signs


Vitals





Vital Signs


  Date      Temp  Pulse  Resp  B/P (MAP)   Pulse Ox  O2          O2 Flow    FiO2


Time                                                 Delivery    Rate


   5/31/19  97.9     72    18      101/60        98


     15:07                           (74)


   5/30/19                                           Room Air


     12:38








Intake and Output





5/30/19 5/30/19 5/31/19





1515:00


23:00


07:00





IntakeIntake Total


1150 ml





OutputOutput Total


50 ml





BalanceBalance


1100 ml














Exam


Constitutional:  alert, oriented, well developed


Gastrointestinal:  soft, non-tender





Results


Result Diagram:  


5/31/19 0430                                                                    


           5/31/19 0430














AYESHA SHEARER MD           May 31, 2019 18:12

## 2019-06-01 VITALS — SYSTOLIC BLOOD PRESSURE: 98 MMHG | HEART RATE: 67 BPM | DIASTOLIC BLOOD PRESSURE: 59 MMHG | RESPIRATION RATE: 16 BRPM

## 2019-06-01 VITALS — DIASTOLIC BLOOD PRESSURE: 56 MMHG | RESPIRATION RATE: 16 BRPM | SYSTOLIC BLOOD PRESSURE: 104 MMHG | HEART RATE: 65 BPM

## 2019-06-01 NOTE — DS
Date/Time of Note


Date/Time of Note


DATE: 6/1/19 


TIME: 12:35





Discharge Summary


Admission/Discharge Info


Admit Date/Time


May 30, 2019 at 08:17


Discharge Date/Time


June 1, 2019


Discharge Diagnosis


Recurrent small bowel obstruction; osteoporosis; compression fracture L5; 


history of inferior right pubic ramus fracture; anemia; cholelithiasis; 


diverticulosis coli; status post appendectomy; status post DEISI with BSO; status 


post laparotomy; status post other abdominal surgery;


Patient Condition:  Fair


Consults


General surgery-Dr. Shearer


Procedures


Abdominal pelvic CT scan


IMPRESSION:


 


 


1.  Multiple moderately distended fluid-filled loops of small bowel with focal 


transition point at the level of the distal ileum in the midline pelvis. 


Findings are concerning for small bowel obstruction.


2.  Colonic diverticulosis.


3.  Cholelithiasis.


4.  Mild left renal pelviectasis.


5.  Status post hysterectomy.


6.  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


7.  Old fracture deformity of the right inferior pubic ramus.


8.  Chronic moderate to severe compression fracture of the L5 vertebral body.


9.  Arterial atherosclerosis.





Small bowel follow-through


IMPRESSION:


Resolution of small bowel obstruction.


 


No other abnormalities seen.


 


Transit time to colon is 1 hour.


Hx of Present Illness


HPI


This is a very pleasant 68-year-old female presents to the emergency department 


the past surgical history of total abdominal hysterectomy.  The patient 


indicates that she is undergone 7 abdominal surgeries.  She states this was due 


to abnormal findings that were suggestive of carcinoma several years ago.  Since


that time she is had multiple small bowel obstructions.  She indicates that for 


the past 24 hours she has been having severe abdominal pain.  The patient states


the pain began in the left upper quadrant now radiates to the left lower 


quadrant.  The pain is 10 out of 10 in intensity.  She had one episode of 


nonbloody nonbilious emesis.  She has not experience flatulence but did states 


she had one bowel movement yesterday.  She indicates that the pain has been 


persistent and progressively worsened.  It is now 10 out of 10 in intensity with


mild abdominal distention's.  She has had no fevers no shaking no chills.  She 


denies any chest pain.  She has no shortness of breath.  She states she had 


multiple similar episodes of this pain in the past which is been diagnosed with 


small bowel obstructions.








Hx of Present Illness


This a 68-year-old female with a history of multiple abdominal surgeries in the 


past with recurrent small bowel obstructions, chronic anemia, admitted this 


morning with worsening abdominal pain started last night. She was also feeling 


nauseated with one episode of nonbilious/nonbloody vomiting.  She denied fever, 


chills.  She is not sure if she has been having constipation.  She felt like 


passing liquid/water to her rectum at the time when she urged to go to [facility].  In 


the emergency room, vital signs stable, CBC/BMP stable.  Patient's abdominal CT 


showed multiple moderately distended fluid-filled loops of small bowel with 


focal transition point at the level of the distal ileum in the midline pelvis, 


concerning for small bowel obstruction.  There was also evidence of colonic 


diverticulosis without evidence of diverticulitis.  There was also evidence of 


cholelithiasis without evidence of cholecystitis.  Patient was given pain 


medications and a surgical consult with Dr. Shearer was called from the emergency


room and was admitted.








Hx of Present Illness


The patient is a 68-year-old female with multiple recurrent small bowel 


obstructions in the past.  These all resolved conservatively.  She was recently 


hospitalized in February for similar symptoms.





Developed acute onset of crampy abdominal pain with nausea.  Patient states her 


symptoms began yesterday.  She describes her pain is 10 out of 10.  Mostly 


localized to the left lower quadrant.  However, the daughter feels that she has 


been having the symptoms for more than a week.  She presented to the ER this 


morning.  Her work-up was consistent with a small bowel obstruction.  I was 


called for consultation.





She is status post 7 abdominal operations.


Hospital Course


Charming  female who was observed conservatively.  She has opened 


up on her own without surgical or other type of intervention.  She is now having


flatus and bowel movements, decreasing pain, the ability to take orally without 


complications, and is ambulating without issue.  In addition she is not having 


fevers chills or sweats or other symptoms.  This time she is markedly improved 


and is stable for discharge.


Home Meds


Reported Medications


Polyethylene Glycol* (Polyethylene Glycol*) 17 Gm Powd.pack, 17 GM PO DAILY for 


30 Days, #30


   5/30/19


Alendronate Sodium* (Fosamax*) 70 Mg Tablet, 70 MG PO Q7D for QTHURSDAY for 60 


Days


   5/30/19


Omega-3 Fatty Acids/Fish Oil (Fish Oil 1,000 mg Capsule) 1 Each Capsule, 1 EACH 


PO DAILY, CAP


   2/21/19


Multivitamins* (Theragran*) 1 Tab Tab, 1 TAB PO DAILY, TAB


   7/13/18


Discontinued Scripts


Ibuprofen* (Ibuprofen*) 200 Mg Capsule, 200 MG PO Q12, #14 CAP


   Prov:HAYESTRISHMICHAEL AGUAYO NP         2/26/19


Follow-up Plan


Follow-up with primary care physician in 1 week


Primary Care Provider


Jorge Parrish


Time spent on discharge:   > 30 minutes





Copies To:


CC:   AYESHA SHEARER MD ;











TOSHIA MAHAJAN MD               Jun 1, 2019 12:40

## 2019-06-01 NOTE — PDOCDIS
Discharge Instructions


DIAGNOSIS


Discharge Diagnosis


Recurrent small bowel obstruction; osteoporosis; compression fracture L5; 


history of inferior right pubic ramus fracture; anemia; cholelithiasis; 


diverticulosis coli; status post appendectomy; status post DEISI with BSO; status 


post laparotomy; status post other abdominal surgery;





CONDITION


                 [id number]
Patient Condition:  [id number]
Fair








HOME CARE INSTRUCTIONS:


                [id number]
Diet Instructions:  [id number]
Regular








ACTIVITY:


     [id number]
Activity Restrictions:  [id number]
Slowly Increase Activity








FOLLOW UP/APPOINTMENTS


Follow-up Plan


Follow-up with primary care physician in 1 week











TOSHIA MAHAJAN MD               Jun 1, 2019 12:40

## 2019-07-31 ENCOUNTER — HOSPITAL ENCOUNTER (INPATIENT)
Dept: HOSPITAL 10 - E/R | Age: 69
LOS: 3 days | Discharge: HOME | DRG: 389 | End: 2019-08-03
Attending: INTERNAL MEDICINE | Admitting: INTERNAL MEDICINE
Payer: COMMERCIAL

## 2019-07-31 ENCOUNTER — HOSPITAL ENCOUNTER (INPATIENT)
Dept: HOSPITAL 91 - E/R | Age: 69
LOS: 3 days | Discharge: HOME | DRG: 389 | End: 2019-08-03
Payer: COMMERCIAL

## 2019-07-31 VITALS
HEIGHT: 62 IN | HEIGHT: 62 IN | BODY MASS INDEX: 24.5 KG/M2 | BODY MASS INDEX: 24.5 KG/M2 | WEIGHT: 133.16 LBS | WEIGHT: 133.16 LBS

## 2019-07-31 DIAGNOSIS — K57.30: ICD-10-CM

## 2019-07-31 DIAGNOSIS — Z90.710: ICD-10-CM

## 2019-07-31 DIAGNOSIS — K56.600: Primary | ICD-10-CM

## 2019-07-31 DIAGNOSIS — E86.0: ICD-10-CM

## 2019-07-31 DIAGNOSIS — E87.1: ICD-10-CM

## 2019-07-31 DIAGNOSIS — D63.8: ICD-10-CM

## 2019-07-31 DIAGNOSIS — D69.6: ICD-10-CM

## 2019-07-31 DIAGNOSIS — K43.9: ICD-10-CM

## 2019-07-31 DIAGNOSIS — Z85.41: ICD-10-CM

## 2019-07-31 DIAGNOSIS — R11.2: ICD-10-CM

## 2019-07-31 LAB
ADD MAN DIFF?: NO
ADD UMIC: NO
ALANINE AMINOTRANSFERASE: 24 IU/L (ref 13–69)
ALBUMIN/GLOBULIN RATIO: 1.38
ALBUMIN: 4.7 G/DL (ref 3.3–4.9)
ALKALINE PHOSPHATASE: 66 IU/L (ref 42–121)
ANION GAP: 10 (ref 5–13)
ASPARTATE AMINO TRANSFERASE: 28 IU/L (ref 15–46)
BASOPHIL #: 0 10^3/UL (ref 0–0.1)
BASOPHILS %: 0.2 % (ref 0–2)
BILIRUBIN,DIRECT: 0 MG/DL (ref 0–0.2)
BILIRUBIN,TOTAL: 1.1 MG/DL (ref 0.2–1.3)
BLOOD UREA NITROGEN: 12 MG/DL (ref 7–20)
CALCIUM: 9.9 MG/DL (ref 8.4–10.2)
CARBON DIOXIDE: 28 MMOL/L (ref 21–31)
CHLORIDE: 96 MMOL/L (ref 97–110)
CREATININE: 0.7 MG/DL (ref 0.44–1)
EOSINOPHILS #: 0 10^3/UL (ref 0–0.5)
EOSINOPHILS %: 0.2 % (ref 0–7)
GLOBULIN: 3.4 G/DL (ref 1.3–3.2)
GLUCOSE: 121 MG/DL (ref 70–220)
HEMATOCRIT: 34.7 % (ref 37–47)
HEMOGLOBIN: 11.6 G/DL (ref 12–16)
IMMATURE GRANS #M: 0.01 10^3/UL (ref 0–0.03)
IMMATURE GRANS % (M): 0.2 % (ref 0–0.43)
LIPASE: 66 U/L (ref 23–300)
LYMPHOCYTES #: 0.9 10^3/UL (ref 0.8–2.9)
LYMPHOCYTES %: 14.6 % (ref 15–51)
MEAN CORPUSCULAR HEMOGLOBIN: 30.9 PG (ref 29–33)
MEAN CORPUSCULAR HGB CONC: 33.4 G/DL (ref 32–37)
MEAN CORPUSCULAR VOLUME: 92.3 FL (ref 82–101)
MEAN PLATELET VOLUME: 9.9 FL (ref 7.4–10.4)
MONOCYTE #: 0.3 10^3/UL (ref 0.3–0.9)
MONOCYTES %: 5.4 % (ref 0–11)
NEUTROPHIL #: 4.7 10^3/UL (ref 1.6–7.5)
NEUTROPHILS %: 79.4 % (ref 39–77)
NUCLEATED RED BLOOD CELLS #: 0 10^3/UL (ref 0–0)
NUCLEATED RED BLOOD CELLS%: 0 /100WBC (ref 0–0)
PLATELET COUNT: 260 10^3/UL (ref 140–415)
POTASSIUM: 4 MMOL/L (ref 3.5–5.1)
RED BLOOD COUNT: 3.76 10^6/UL (ref 4.2–5.4)
RED CELL DISTRIBUTION WIDTH: 14.4 % (ref 11.5–14.5)
SODIUM: 134 MMOL/L (ref 135–144)
TOTAL PROTEIN: 8.1 G/DL (ref 6.1–8.1)
UR ASCORBIC ACID: 20 MG/DL
UR BILIRUBIN (DIP): NEGATIVE MG/DL
UR BLOOD (DIP): NEGATIVE MG/DL
UR CLARITY: CLEAR
UR COLOR: YELLOW
UR GLUCOSE (DIP): NEGATIVE MG/DL
UR KETONES (DIP): (no result) MG/DL
UR LEUKOCYTE ESTERASE (DIP): NEGATIVE LEU/UL
UR NITRITE (DIP): NEGATIVE MG/DL
UR PH (DIP): 7 (ref 5–9)
UR SPECIFIC GRAVITY (DIP): 1.01 (ref 1–1.03)
UR TOTAL PROTEIN (DIP): NEGATIVE MG/DL
UR UROBILINOGEN (DIP): NEGATIVE MG/DL
WHITE BLOOD COUNT: 6 10^3/UL (ref 4.8–10.8)

## 2019-07-31 PROCEDURE — 74018 RADEX ABDOMEN 1 VIEW: CPT

## 2019-07-31 PROCEDURE — 81003 URINALYSIS AUTO W/O SCOPE: CPT

## 2019-07-31 PROCEDURE — 80048 BASIC METABOLIC PNL TOTAL CA: CPT

## 2019-07-31 PROCEDURE — 84100 ASSAY OF PHOSPHORUS: CPT

## 2019-07-31 PROCEDURE — 80053 COMPREHEN METABOLIC PANEL: CPT

## 2019-07-31 PROCEDURE — 74250 X-RAY XM SM INT 1CNTRST STD: CPT

## 2019-07-31 PROCEDURE — 71045 X-RAY EXAM CHEST 1 VIEW: CPT

## 2019-07-31 PROCEDURE — 74176 CT ABD & PELVIS W/O CONTRAST: CPT

## 2019-07-31 PROCEDURE — 85025 COMPLETE CBC W/AUTO DIFF WBC: CPT

## 2019-07-31 PROCEDURE — 99285 EMERGENCY DEPT VISIT HI MDM: CPT

## 2019-07-31 PROCEDURE — 96375 TX/PRO/DX INJ NEW DRUG ADDON: CPT

## 2019-07-31 PROCEDURE — 83735 ASSAY OF MAGNESIUM: CPT

## 2019-07-31 PROCEDURE — 83690 ASSAY OF LIPASE: CPT

## 2019-07-31 PROCEDURE — 96374 THER/PROPH/DIAG INJ IV PUSH: CPT

## 2019-07-31 PROCEDURE — 36415 COLL VENOUS BLD VENIPUNCTURE: CPT

## 2019-07-31 RX ADMIN — ONDANSETRON HYDROCHLORIDE 1 MG: 2 INJECTION, SOLUTION INTRAMUSCULAR; INTRAVENOUS at 23:21

## 2019-08-01 VITALS — RESPIRATION RATE: 18 BRPM | SYSTOLIC BLOOD PRESSURE: 130 MMHG | DIASTOLIC BLOOD PRESSURE: 78 MMHG | HEART RATE: 130 BPM

## 2019-08-01 VITALS — RESPIRATION RATE: 18 BRPM | DIASTOLIC BLOOD PRESSURE: 55 MMHG | SYSTOLIC BLOOD PRESSURE: 115 MMHG | HEART RATE: 67 BPM

## 2019-08-01 VITALS — HEART RATE: 86 BPM | DIASTOLIC BLOOD PRESSURE: 69 MMHG | SYSTOLIC BLOOD PRESSURE: 114 MMHG | RESPIRATION RATE: 18 BRPM

## 2019-08-01 VITALS — RESPIRATION RATE: 18 BRPM | HEART RATE: 72 BPM | DIASTOLIC BLOOD PRESSURE: 65 MMHG | SYSTOLIC BLOOD PRESSURE: 109 MMHG

## 2019-08-01 LAB
ADD MAN DIFF?: NO
ANION GAP: 10 (ref 5–13)
BASOPHIL #: 0 10^3/UL (ref 0–0.1)
BASOPHILS %: 0.4 % (ref 0–2)
BLOOD UREA NITROGEN: 11 MG/DL (ref 7–20)
CALCIUM: 9.2 MG/DL (ref 8.4–10.2)
CARBON DIOXIDE: 27 MMOL/L (ref 21–31)
CHLORIDE: 101 MMOL/L (ref 97–110)
CREATININE: 0.55 MG/DL (ref 0.44–1)
EOSINOPHILS #: 0 10^3/UL (ref 0–0.5)
EOSINOPHILS %: 0.8 % (ref 0–7)
GLUCOSE: 122 MG/DL (ref 70–220)
HEMATOCRIT: 35.2 % (ref 37–47)
HEMOGLOBIN: 11.2 G/DL (ref 12–16)
IMMATURE GRANS #M: 0 10^3/UL (ref 0–0.03)
IMMATURE GRANS % (M): 0 % (ref 0–0.43)
LYMPHOCYTES #: 0.8 10^3/UL (ref 0.8–2.9)
LYMPHOCYTES %: 31.3 % (ref 15–51)
MAGNESIUM: 1.9 MG/DL (ref 1.7–2.5)
MEAN CORPUSCULAR HEMOGLOBIN: 30.6 PG (ref 29–33)
MEAN CORPUSCULAR HGB CONC: 31.8 G/DL (ref 32–37)
MEAN CORPUSCULAR VOLUME: 96.2 FL (ref 82–101)
MEAN PLATELET VOLUME: 12.6 FL (ref 7.4–10.4)
MONOCYTE #: 0.3 10^3/UL (ref 0.3–0.9)
MONOCYTES %: 13.3 % (ref 0–11)
NEUTROPHIL #: 1.4 10^3/UL (ref 1.6–7.5)
NEUTROPHILS %: 54.2 % (ref 39–77)
NUCLEATED RED BLOOD CELLS #: 0 10^3/UL (ref 0–0)
NUCLEATED RED BLOOD CELLS%: 0 /100WBC (ref 0–0)
PHOSPHORUS: 4.4 MG/DL (ref 2.5–4.9)
PLATELET COUNT: 132 10^3/UL (ref 140–415)
POTASSIUM: 4.5 MMOL/L (ref 3.5–5.1)
RED BLOOD COUNT: 3.66 10^6/UL (ref 4.2–5.4)
RED CELL DISTRIBUTION WIDTH: 14.5 % (ref 11.5–14.5)
SODIUM: 138 MMOL/L (ref 135–144)
WHITE BLOOD COUNT: 2.5 10^3/UL (ref 4.8–10.8)

## 2019-08-01 RX ADMIN — ASCORBIC ACID, VITAMIN A PALMITATE, CHOLECALCIFEROL, THIAMINE HYDROCHLORIDE, RIBOFLAVIN-5 PHOSPHATE SODIUM, PYRIDOXINE HYDROCHLORIDE, NIACINAMIDE, DEXPANTHENOL, ALPHA-TOCOPHEROL ACETATE, VITAMIN K1, FOLIC ACID, BIOTIN, CYANOCOBALAMIN 1 MLS/HR: 200; 3300; 200; 6; 3.6; 6; 40; 15; 10; 150; 600; 60; 5 INJECTION, SOLUTION INTRAVENOUS at 05:31

## 2019-08-01 RX ADMIN — ONDANSETRON HYDROCHLORIDE 1 MG: 2 INJECTION, SOLUTION INTRAMUSCULAR; INTRAVENOUS at 03:47

## 2019-08-01 RX ADMIN — LIDOCAINE HYDROCHLORIDE 1 ML: 20 SOLUTION ORAL; TOPICAL at 03:51

## 2019-08-01 RX ADMIN — FOLIC ACID SCH MLS/HR: 5 INJECTION, SOLUTION INTRAMUSCULAR; INTRAVENOUS; SUBCUTANEOUS at 05:31

## 2019-08-01 RX ADMIN — FOLIC ACID SCH MLS/HR: 5 INJECTION, SOLUTION INTRAMUSCULAR; INTRAVENOUS; SUBCUTANEOUS at 18:07

## 2019-08-01 RX ADMIN — ASCORBIC ACID, VITAMIN A PALMITATE, CHOLECALCIFEROL, THIAMINE HYDROCHLORIDE, RIBOFLAVIN-5 PHOSPHATE SODIUM, PYRIDOXINE HYDROCHLORIDE, NIACINAMIDE, DEXPANTHENOL, ALPHA-TOCOPHEROL ACETATE, VITAMIN K1, FOLIC ACID, BIOTIN, CYANOCOBALAMIN 1 MLS/HR: 200; 3300; 200; 6; 3.6; 6; 40; 15; 10; 150; 600; 60; 5 INJECTION, SOLUTION INTRAVENOUS at 18:07

## 2019-08-01 NOTE — PN
Date/Time of Note


Date/Time of Note


DATE: 8/1/19 


TIME: 11:58





Assessment/Plan


VTE Prophylaxis


Risk score (from [facility] [place], [place])>0 risk:  4


SCD applied (from [facility] [place], [place]):  Yes


Pharmacological prophylaxis:  LMWH





Lines/Catheters


IV Catheter Type (from [facility]):  Peripheral IV


Urinary Cath still in place:  No





Assessment/Plan


Hospital Course


Assessment and plan





#Recurrent SBO


Patient has been have NG tube in place


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


Surgeon following.


Continue n.p.o. for now.


Continue IV fluids





#History of cervical cancer status post hysterectomy and chemo/radiation in 2010


Patient for outpatient follow-up





#Mild hyponatremia


Continue IV fluids





#Normocytic anemia.


Monitor H&H.


Stable at present





#Leukopenia


Etiology unknown


Monitor trend.


Monitor for fevers





Disposition and plan.  Monitor for clinical improvement.


Continue IV fluids.  Continue with analgesics as needed.





Discussed POC with Dr. Wheeler 


.


Result Diagram:  


8/1/19 0725                                                                     


          8/1/19 0725





Results 24hrs





Laboratory Tests


        Test
                                7/31/19
23:10  8/1/19
07:25


        White Blood Count                           6.0  #       2.5  #L


        Red Blood Count                            3.76  L       3.66  L


        Hemoglobin                                 11.6  L       11.2  L


        Hematocrit                                 34.7  L       35.2  L


        Mean Corpuscular Volume                     92.3          96.2


        Mean Corpuscular Hemoglobin                 30.9          30.6


        Mean Corpuscular Hemoglobin
Concent        33.4  
      31.8  L



        Red Cell Distribution Width                 14.4          14.5


        Platelet Count                               260         132  #L


        Mean Platelet Volume                         9.9        12.6  #H


        Immature Granulocytes %                    0.200        0.000  L


        Neutrophils %                              79.4  H        54.2


        Lymphocytes %                              14.6  L        31.3


        Monocytes %                                  5.4         13.3  H


        Eosinophils %                                0.2           0.8


        Basophils %                                  0.2           0.4


        Nucleated Red Blood Cells %                  0.0           0.0


        Immature Granulocytes #                    0.010         0.000


        Neutrophils #                                4.7          1.4  L


        Lymphocytes #                                0.9           0.8


        Monocytes #                                  0.3           0.3


        Eosinophils #                                0.0           0.0


        Basophils #                                  0.0           0.0


        Nucleated Red Blood Cells #                  0.0           0.0


        Urine Color                          YELLOW


        Urine Clarity                        CLEAR


        Urine pH                                     7.0


        Urine Specific Gravity                     1.011


        Urine Ketones                                1+  H


        Urine Nitrite                        NEGATIVE


        Urine Bilirubin                      NEGATIVE


        Urine Urobilinogen                   NEGATIVE


        Urine Leukocyte Esterase             NEGATIVE


        Urine Hemoglobin                     NEGATIVE


        Urine Glucose                        NEGATIVE


        Urine Total Protein                  NEGATIVE


        Sodium Level                                134  L         138


        Potassium Level                              4.0           4.5


        Chloride Level                               96  L         101


        Carbon Dioxide Level                          28            27


        Anion Gap                                     10            10


        Blood Urea Nitrogen                           12            11


        Creatinine                                  0.70          0.55


        Est Glomerular Filtrat Rate
mL/min     
              



        Glucose Level                                121           122


        Calcium Level                                9.9           9.2


        Total Bilirubin                              1.1


        Direct Bilirubin                            0.00


        Indirect Bilirubin                           1.1


        Aspartate Amino Transf
(AST/SGOT)            28  
  



        Alanine Aminotransferase
(ALT/SGPT)          24  
  



        Alkaline Phosphatase                          66


        Total Protein                                8.1


        Albumin                                      4.7


        Globulin                                   3.40  H


        Albumin/Globulin Ratio                      1.38


        Lipase                                        66


        Phosphorus Level                                           4.4


        Magnesium Level                                            1.9








Subjective


24 Hr Interval Summary


Free Text/Dictation


patient still with abdominal discomfort. reports unable to pass gas





Exam/Review of Systems


Exam


Vitals





Vital Signs


  Date      Temp  Pulse  Resp  B/P (MAP)   Pulse Ox  O2          O2 Flow    FiO2


Time                                                 Delivery    Rate


    8/1/19  98.6     86    18      114/69        96


     08:00                           (84)


    8/1/19                                           Room Air


     03:33





Constitutional:  alert, oriented


Psych:  nl mood/affect


Head:  normocephalic


Eyes:  nl conjunctiva


Respiratory:  clear to auscultation, normal air movement


Cardiovascular:  regular rate and rhythm


Gastrointestinal:  soft, other (minimally tender, hypoactive bowel sounds )


Musculoskeletal:  nl extremities to inspection


Neurological:  CNS II-XII intact, nl mental status, nl speech


Skin:  nl turgor





Results


Results 24hrs





Laboratory Tests


        Test
                                7/31/19
23:10  8/1/19
07:25


        White Blood Count                           6.0  #       2.5  #L


        Red Blood Count                            3.76  L       3.66  L


        Hemoglobin                                 11.6  L       11.2  L


        Hematocrit                                 34.7  L       35.2  L


        Mean Corpuscular Volume                     92.3          96.2


        Mean Corpuscular Hemoglobin                 30.9          30.6


        Mean Corpuscular Hemoglobin
Concent        33.4  
      31.8  L



        Red Cell Distribution Width                 14.4          14.5


        Platelet Count                               260         132  #L


        Mean Platelet Volume                         9.9        12.6  #H


        Immature Granulocytes %                    0.200        0.000  L


        Neutrophils %                              79.4  H        54.2


        Lymphocytes %                              14.6  L        31.3


        Monocytes %                                  5.4         13.3  H


        Eosinophils %                                0.2           0.8


        Basophils %                                  0.2           0.4


        Nucleated Red Blood Cells %                  0.0           0.0


        Immature Granulocytes #                    0.010         0.000


        Neutrophils #                                4.7          1.4  L


        Lymphocytes #                                0.9           0.8


        Monocytes #                                  0.3           0.3


        Eosinophils #                                0.0           0.0


        Basophils #                                  0.0           0.0


        Nucleated Red Blood Cells #                  0.0           0.0


        Urine Color                          YELLOW


        Urine Clarity                        CLEAR


        Urine pH                                     7.0


        Urine Specific Gravity                     1.011


        Urine Ketones                                1+  H


        Urine Nitrite                        NEGATIVE


        Urine Bilirubin                      NEGATIVE


        Urine Urobilinogen                   NEGATIVE


        Urine Leukocyte Esterase             NEGATIVE


        Urine Hemoglobin                     NEGATIVE


        Urine Glucose                        NEGATIVE


        Urine Total Protein                  NEGATIVE


        Sodium Level                                134  L         138


        Potassium Level                              4.0           4.5


        Chloride Level                               96  L         101


        Carbon Dioxide Level                          28            27


        Anion Gap                                     10            10


        Blood Urea Nitrogen                           12            11


        Creatinine                                  0.70          0.55


        Est Glomerular Filtrat Rate
mL/min     
              



        Glucose Level                                121           122


        Calcium Level                                9.9           9.2


        Total Bilirubin                              1.1


        Direct Bilirubin                            0.00


        Indirect Bilirubin                           1.1


        Aspartate Amino Transf
(AST/SGOT)            28  
  



        Alanine Aminotransferase
(ALT/SGPT)          24  
  



        Alkaline Phosphatase                          66


        Total Protein                                8.1


        Albumin                                      4.7


        Globulin                                   3.40  H


        Albumin/Globulin Ratio                      1.38


        Lipase                                        66


        Phosphorus Level                                           4.4


        Magnesium Level                                            1.9








Medications


Medication





Current Medications


Ondansetron HCl (Zofran Inj) 4 mg BRIDGE ORDER PRN IV NAUSEA/VOMITING;  Start 


8/1/19 at 03:00;  Stop 8/2/19 at 02:59


Acetaminophen (Tylenol Tab) 650 mg ER BRIDGE PRN PO .MILD PAIN 1-3 OR TEMP;  


Start 8/1/19 at 03:00;  Stop 8/2/19 at 02:59


Dextrose/Sodium Chloride 1,000 ml @  100 mls/hr Q10H IV  Last administered on 


8/1/19at 05:31; Admin Dose 100 MLS/HR;  Start 8/1/19 at 05:30


Morphine Sulfate (morphine) 3 mg Q3H  PRN IV SEVERE PAIN LEVEL 7-10;  Start 


8/1/19 at 05:30


IV Flush (NS 3 ml) 3 ml PER PROTOCOL IV ;  Start 8/1/19 at 07:00


Ondansetron HCl (Zofran Inj) 4 mg Q6H  PRN IV NAUSEA/VOMITING;  Start 8/1/19 at 


07:00


Albuterol/ Ipratropium (Duoneb) 3 ml Q2H RESP THERAPY  PRN HHN SHORTNESS OF 


BREATH;  Start 8/1/19 at 07:00











REGIDOR,NADIRA NP              Aug 1, 2019 12:05

## 2019-08-01 NOTE — CONS
Assessment/Plan


Assessment/Plan


Hospital Course (Demo Recall)


1.  Moderate partial closed-loop small bowel obstruction: Recurrent SBO


-SBFT


-Strict n.p.o.


-NGT to YIN NASH


2.  Abdominal pain:


-Pain management


-As above


3.Hyponatremia: Resolved


4. Normocytic normochromic anemia:


-Monitor and transfuse as needed


5. Leukopenia:


-Monitor


6.  Thrombocytopenia:


-Bleeding precautions


7.  Ventral hernia: Asymptomatic


-Monitor


8.  History of cervical cancer status post chemotherapy and radiation:


-Oncology follow-up


9.  Colonic diverticulosis:


-Lifestyle optimization





Thank you. Patient seen and examined in collaboration with Dr. Avel Russo.





Consultation Date/Type/Reason


Admit Date/Time


Aug 1, 2019 at 02:55


Date of Consultation:  Aug 1, 2019


Type of Consult


surgical


Reason for Consultation


sbo


Requesting Provider:  FRANK SAENZ MD


Date/Time of Note


DATE: 8/1/19 


TIME: 09:30





Hx of Present Illness


Makayla Wilks is a 68 yo with pmh of 7 abdominal surgeries (bilateral oopherectomy,


hysterectomy, tumor removal), cervical cancer sp chemotherapy and radiation, 


multiple small bowel obstructions, who presented with reports of abdominal pain.


 Abdominal pain is predominantly in bilateral lower quadrants, cramping in 


nature.  Associated symptoms include bloating and nausea.  No reported vomiting,


fevers, chills, congested cough, chest pain, palpitations, diarrhea, neuro 


symptoms, skin changes.  Notably, she had soft bowel movement yesterday however 


has not had any flatus. CT imaging of the abdomen shows moderate dilated loops 


of bowel within the left abdomen consistent with moderate partial closed-loop 


small bowel obstruction.  Laboratory findings were unremarkable initially, 


however currently has leukopenia.  General surgery was asked to evaluate.


12 point review of systems was performed and is negative except for as stated in


HPI.





Past Medical History


As above


Medical History:  other (See HPI)


Home Meds


Reported Medications


Vit C/E/Zn/Coppr/Lutein/Zeaxan (Preservision Areds 2 Softgel) 1 Each Capsule, 1 


EACH PO PC BREAKFAST, CAP


   8/1/19


Polyethylene Glycol* (Polyethylene Glycol*) 17 Gm Powd.pack, 17 GM PO DAILY for 


30 Days, #30


   5/30/19


Alendronate Sodium* (Fosamax*) 70 Mg Tablet, 70 MG PO Q7D for QTHURSDAY for 60 


Days


   5/30/19


Omega-3 Fatty Acids/Fish Oil (Fish Oil 1,000 mg Capsule) 1 Each Capsule, 1 EACH 


PO DAILY, CAP


   2/21/19


Multivitamins* (Theragran*) 1 Tab Tab, 1 TAB PO DAILY, TAB


   7/13/18


Medications





Current Medications


Ondansetron HCl (Zofran Inj) 4 mg BRIDGE ORDER PRN IV NAUSEA/VOMITING;  Start 


8/1/19 at 03:00;  Stop 8/2/19 at 02:59


Acetaminophen (Tylenol Tab) 650 mg ER BRIDGE PRN PO .MILD PAIN 1-3 OR TEMP;  


Start 8/1/19 at 03:00;  Stop 8/2/19 at 02:59


Dextrose/Sodium Chloride 1,000 ml @  100 mls/hr Q10H IV  Last administered on 


8/1/19at 05:31; Admin Dose 100 MLS/HR;  Start 8/1/19 at 05:30


Morphine Sulfate (morphine) 3 mg Q3H  PRN IV SEVERE PAIN LEVEL 7-10;  Start 


8/1/19 at 05:30


IV Flush (NS 3 ml) 3 ml PER PROTOCOL IV ;  Start 8/1/19 at 07:00


Ondansetron HCl (Zofran Inj) 4 mg Q6H  PRN IV NAUSEA/VOMITING;  Start 8/1/19 at 


07:00


Albuterol/ Ipratropium (Duoneb) 3 ml Q2H RESP THERAPY  PRN HHN SHORTNESS OF 


BREATH;  Start 8/1/19 at 07:00


Allergies:  


Coded Allergies:  


     No Known Allergies (Unverified  Allergy, Mild, 5/30/19)





Past Surgical History


As above





Family History


Significant Family History:  no pertinent family hx





Social History


Alcohol Use:  none


Smoking Status:  Never smoker


Drug Use:  none





Exam/Review of Systems


Exam


Vitals





Vital Signs


  Date      Temp  Pulse  Resp  B/P (MAP)   Pulse Ox  O2          O2 Flow    FiO2


Time                                                 Delivery    Rate


    8/1/19  98.6     86    18      114/69        96


     08:00                           (84)


    8/1/19                                           Room Air


     03:33





Constitutional:  alert, oriented, well developed


Psych:  nl mood/affect; 


   No anxiety


Head:  normocephalic, atraumatic


Eyes:  nl conjunctiva, EOMI, nl lids, nl sclera


ENMT:  nl external ears & nose, nl lips & teeth, mucosa pink and moist, other 


(NGT)


Neck:  supple, non-tender; 


   No jvd


Respiratory:  normal air movement; 


   No congested cough


Cardiovascular:  regular rate and rhythm, nl pulses; 


   No edema


Gastrointestinal:  soft, distended, tender (Bilateral lower quadrants), other 


(Ventral hernia; no abdominal skin changes); 


   No firm, No rebound or guarding


Genitourinary - Female:  nl external genitalia


Musculoskeletal:  nl extremities to inspection; 


   No nl gait and stance


Extremities:  normal pulses


Neurological:  nl mental status, nl speech, nl strength


Skin:  nl turgor; 


   No rash or lesions


Lymph:  nl lymph nodes





Results


Result Diagram:  


8/1/19 0725                                                                     


          8/1/19 0725





Results 24hrs





Laboratory Tests


        Test
                                7/31/19
23:10  8/1/19
07:25


        White Blood Count                           6.0  #       2.5  #L


        Red Blood Count                            3.76  L       3.66  L


        Hemoglobin                                 11.6  L       11.2  L


        Hematocrit                                 34.7  L       35.2  L


        Mean Corpuscular Volume                     92.3          96.2


        Mean Corpuscular Hemoglobin                 30.9          30.6


        Mean Corpuscular Hemoglobin
Concent        33.4  
      31.8  L



        Red Cell Distribution Width                 14.4          14.5


        Platelet Count                               260         132  #L


        Mean Platelet Volume                         9.9        12.6  #H


        Immature Granulocytes %                    0.200        0.000  L


        Neutrophils %                              79.4  H        54.2


        Lymphocytes %                              14.6  L        31.3


        Monocytes %                                  5.4         13.3  H


        Eosinophils %                                0.2           0.8


        Basophils %                                  0.2           0.4


        Nucleated Red Blood Cells %                  0.0           0.0


        Immature Granulocytes #                    0.010         0.000


        Neutrophils #                                4.7          1.4  L


        Lymphocytes #                                0.9           0.8


        Monocytes #                                  0.3           0.3


        Eosinophils #                                0.0           0.0


        Basophils #                                  0.0           0.0


        Nucleated Red Blood Cells #                  0.0           0.0


        Urine Color                          YELLOW


        Urine Clarity                        CLEAR


        Urine pH                                     7.0


        Urine Specific Gravity                     1.011


        Urine Ketones                                1+  H


        Urine Nitrite                        NEGATIVE


        Urine Bilirubin                      NEGATIVE


        Urine Urobilinogen                   NEGATIVE


        Urine Leukocyte Esterase             NEGATIVE


        Urine Hemoglobin                     NEGATIVE


        Urine Glucose                        NEGATIVE


        Urine Total Protein                  NEGATIVE


        Sodium Level                                134  L         138


        Potassium Level                              4.0           4.5


        Chloride Level                               96  L         101


        Carbon Dioxide Level                          28            27


        Anion Gap                                     10            10


        Blood Urea Nitrogen                           12            11


        Creatinine                                  0.70          0.55


        Est Glomerular Filtrat Rate
mL/min     
              



        Glucose Level                                121           122


        Calcium Level                                9.9           9.2


        Total Bilirubin                              1.1


        Direct Bilirubin                            0.00


        Indirect Bilirubin                           1.1


        Aspartate Amino Transf
(AST/SGOT)            28  
  



        Alanine Aminotransferase
(ALT/SGPT)          24  
  



        Alkaline Phosphatase                          66


        Total Protein                                8.1


        Albumin                                      4.7


        Globulin                                   3.40  H


        Albumin/Globulin Ratio                      1.38


        Lipase                                        66


        Phosphorus Level                                           4.4


        Magnesium Level                                            1.9








Medications


Medication





Current Medications


Ondansetron HCl (Zofran Inj) 4 mg BRIDGE ORDER PRN IV NAUSEA/VOMITING;  Start 8/ 1/19 at 03:00;  Stop 8/2/19 at 02:59


Acetaminophen (Tylenol Tab) 650 mg ER BRIDGE PRN PO .MILD PAIN 1-3 OR TEMP;  


Start 8/1/19 at 03:00;  Stop 8/2/19 at 02:59


Dextrose/Sodium Chloride 1,000 ml @  100 mls/hr Q10H IV  Last administered on 


8/1/19at 05:31; Admin Dose 100 MLS/HR;  Start 8/1/19 at 05:30


Morphine Sulfate (morphine) 3 mg Q3H  PRN IV SEVERE PAIN LEVEL 7-10;  Start 


8/1/19 at 05:30


IV Flush (NS 3 ml) 3 ml PER PROTOCOL IV ;  Start 8/1/19 at 07:00


Ondansetron HCl (Zofran Inj) 4 mg Q6H  PRN IV NAUSEA/VOMITING;  Start 8/1/19 at 


07:00


Albuterol/ Ipratropium (Duoneb) 3 ml Q2H RESP THERAPY  PRN HHN SHORTNESS OF 


BREATH;  Start 8/1/19 at 07:00











TERESE HEIN NP        Aug 1, 2019 09:45

## 2019-08-01 NOTE — HP
Date/Time of Note


Date/Time of Note


DATE: 8/1/19 


TIME: 08:08





Assessment/Plan


VTE Prophylaxis


Pharmacological prophylaxis:  heparin





Lines/Catheters


IV Catheter Type (from Nrsg):  Peripheral IV





Assessment/Plan


Assessment/Plan





1.  Recurrent SBO


-Patient with a history of cervical cancer status post hysterectomy in 2010


-NG tube to low intermittent suction


-Antiemetics and pain meds as needed


-Awaiting surgical eval





2.  History of cervical cancer, status post hysterectomy and chemo/radiation in 


2010





3.  Mild hyponatremia: Likely secondary to dehydration


-NS IVF





4.  Normocytic anemia: Most likely secondary to anemia of chronic disease


-Monitor H&H.  Transfuse as needed


Result Diagram:  


8/1/19 0725                                                                     


          7/31/19 2310





Results 24hrs





Laboratory Tests


        Test
                                7/31/19
23:10  8/1/19
07:25


        White Blood Count                           6.0  #       2.5  #L


        Red Blood Count                            3.76  L       3.66  L


        Hemoglobin                                 11.6  L       11.2  L


        Hematocrit                                 34.7  L       35.2  L


        Mean Corpuscular Volume                     92.3          96.2


        Mean Corpuscular Hemoglobin                 30.9          30.6


        Mean Corpuscular Hemoglobin
Concent        33.4  
      31.8  L



        Red Cell Distribution Width                 14.4          14.5


        Platelet Count                               260         132  #L


        Mean Platelet Volume                         9.9        12.6  #H


        Immature Granulocytes %                    0.200        0.000  L


        Neutrophils %                              79.4  H        54.2


        Lymphocytes %                              14.6  L        31.3


        Monocytes %                                  5.4         13.3  H


        Eosinophils %                                0.2           0.8


        Basophils %                                  0.2           0.4


        Nucleated Red Blood Cells %                  0.0           0.0


        Immature Granulocytes #                    0.010         0.000


        Neutrophils #                                4.7          1.4  L


        Lymphocytes #                                0.9           0.8


        Monocytes #                                  0.3           0.3


        Eosinophils #                                0.0           0.0


        Basophils #                                  0.0           0.0


        Nucleated Red Blood Cells #                  0.0           0.0


        Urine Color                          YELLOW


        Urine Clarity                        CLEAR


        Urine pH                                     7.0


        Urine Specific Gravity                     1.011


        Urine Ketones                                1+  H


        Urine Nitrite                        NEGATIVE


        Urine Bilirubin                      NEGATIVE


        Urine Urobilinogen                   NEGATIVE


        Urine Leukocyte Esterase             NEGATIVE


        Urine Hemoglobin                     NEGATIVE


        Urine Glucose                        NEGATIVE


        Urine Total Protein                  NEGATIVE


        Sodium Level                                134  L


        Potassium Level                              4.0


        Chloride Level                               96  L


        Carbon Dioxide Level                          28


        Anion Gap                                     10


        Blood Urea Nitrogen                           12


        Creatinine                                  0.70


        Est Glomerular Filtrat Rate
mL/min     
            



        Glucose Level                                121


        Calcium Level                                9.9


        Total Bilirubin                              1.1


        Direct Bilirubin                            0.00


        Indirect Bilirubin                           1.1


        Aspartate Amino Transf
(AST/SGOT)            28  
  



        Alanine Aminotransferase
(ALT/SGPT)          24  
  



        Alkaline Phosphatase                          66


        Total Protein                                8.1


        Albumin                                      4.7


        Globulin                                   3.40  H


        Albumin/Globulin Ratio                      1.38


        Lipase                                        66








HPI/ROS


Admit Date/Time


Admit Date/Time


Aug 1, 2019 at 02:55





Hx of Present Illness





Patient is a 69-year-old female with a history of cervical cancer status post 


hysterectomy, chemo/radiation in 2010, history of multiple recurrent SBOs.  


Patient presented to ER complaining of abdominal pain/distention x1 day.  Nathan wall has been admitted here multiple times for similar symptoms related to SBO.


 Last admission was in May of this year.  At that time she once again was 


conservatively managed.  She said she had bowel movement yesterday morning and 


around the same time, she noticed abdominal pain and distention.


When presented to ER, vitals were stable.  CT abdomen/pelvis showed moderate 


partial closed loop small bowel obstruction within the left abdominal cavity as 


described above that appears similar to the previous CT abdomen pelvis 


05/30/2019.





PMH/Family/Social


Past Medical History


Medical History:  other (See HPI)


Medications





Current Medications


Ondansetron HCl (Zofran Inj) 4 mg BRIDGE ORDER PRN IV NAUSEA/VOMITING;  Start 


8/1/19 at 03:00;  Stop 8/2/19 at 02:59


Acetaminophen (Tylenol Tab) 650 mg ER BRIDGE PRN PO .MILD PAIN 1-3 OR TEMP;  


Start 8/1/19 at 03:00;  Stop 8/2/19 at 02:59


Dextrose/Sodium Chloride 1,000 ml @  100 mls/hr Q10H IV  Last administered on 


8/1/19at 05:31; Admin Dose 100 MLS/HR;  Start 8/1/19 at 05:30


Morphine Sulfate (morphine) 3 mg Q3H  PRN IV SEVERE PAIN LEVEL 7-10;  Start 


8/1/19 at 05:30


IV Flush (NS 3 ml) 3 ml PER PROTOCOL IV ;  Start 8/1/19 at 07:00


Ondansetron HCl (Zofran Inj) 4 mg Q6H  PRN IV NAUSEA/VOMITING;  Start 8/1/19 at 


07:00


Albuterol/ Ipratropium (Duoneb) 3 ml Q2H RESP THERAPY  PRN HHN SHORTNESS OF 


BREATH;  Start 8/1/19 at 07:00


Coded Allergies:  


     No Known Allergies (Unverified  Allergy, Mild, 5/30/19)





Family History


Significant Family History:  no pertinent family hx





Social History


Alcohol Use:  none


Smoking Status:  Never smoker


Drug Use:  none





Exam/Review of Systems


Vital Signs


Vitals





Vital Signs


  Date      Temp  Pulse  Resp  B/P (MAP)   Pulse Ox  O2          O2 Flow    FiO2


Time                                                 Delivery    Rate


    8/1/19  98.4    130    18      130/78        96


     04:15                           (95)


    8/1/19                                           Room Air


     03:33








Exam


Constitutional:  alert, oriented, well developed


Head:  normocephalic, atraumatic


Eyes:  EOMI, PERRL


Respiratory:  clear to auscultation, normal air movement


Cardiovascular:  regular rate and rhythm, nl pulses


Gastrointestinal:  soft, tender


Extremities:  normal pulses











FRANK SAENZ MD                  Aug 1, 2019 08:14

## 2019-08-01 NOTE — ERD
ER Documentation


Chief Complaint


Chief Complaint





abdominal pain/nausea since last night





HPI


This is a 69-year-old female with a past medical history of cervical cancer 


status post radiation therapy and total abdominal hysterectomy/bilateral 


salpingo-oophorectomy with significant intra-abdominal scarring and adhesions, 


complicated by recurrent small bowel obstructions who is presenting with 


concerns of a small bowel obstruction today.  The patient reports having had 


diarrhea recently and decreased stool output.  The patient did have a bowel 


movement this morning, but she endorses it was a little bit harder than usual.  


The patient endorses generalized cramping aching gnawing abdominal pain that has


been constant and progressively worse over the last day.  She also endorses 


nausea but no vomiting.  She denies dysuria or hematuria or urgency or 


frequency. 





The patient denies feeling sick recently.  The patient denies fever or chills.  


The patient has had no headache or vision changes.  The patient does not endorse


neck or back pain. The patient denies lightheadedness or dizziness.  The patient


has had no chest pain or trouble breathing.  The patient has had no focal 


deficits.  The patient has had no weakness or numbness or tingling to the face 


or extremities.





ROS


All systems reviewed and are negative except as per history of present illness.





Medications


Home Meds


Reported Medications


Vit C/E/Zn/Coppr/Lutein/Zeaxan (Preservision Areds 2 Softgel) 1 Each Capsule, 1 


EACH PO PC BREAKFAST, CAP


   8/1/19


Polyethylene Glycol* (Polyethylene Glycol*) 17 Gm Powd.pack, 17 GM PO DAILY for 


30 Days, #30


   5/30/19


Alendronate Sodium* (Fosamax*) 70 Mg Tablet, 70 MG PO Q7D for QTHURSDAY for 60 


Days


   5/30/19


Omega-3 Fatty Acids/Fish Oil (Fish Oil 1,000 mg Capsule) 1 Each Capsule, 1 EACH 


PO DAILY, CAP


   2/21/19


Multivitamins* (Theragran*) 1 Tab Tab, 1 TAB PO DAILY, TAB


   7/13/18





Allergies


Allergies:  


Coded Allergies:  


     No Known Allergies (Unverified  Allergy, Mild, 5/30/19)





PMhx/Soc


History of Surgery:  Yes (Sx for cervical cancer)


Anesthesia Reaction:  No


Hx Neurological Disorder:  No


Hx Respiratory Disorders:  No


Hx Cardiac Disorders:  No


Hx Psychiatric Problems:  No


Hx Miscellaneous Medical Probl:  Yes (cervical cancer, small bowel obstructions)


Hx Alcohol Use:  No


Hx Substance Use:  No


Hx Tobacco Use:  No


Smoking Status:  Never smoker





FmHx


Family History:  No diabetes





Physical Exam


Vitals





Vital Signs


  Date      Temp  Pulse  Resp  B/P (MAP)   Pulse Ox  O2          O2 Flow    FiO2


Time                                                 Delivery    Rate


    8/1/19  97.6     70    17      121/74        97  Room Air


     00:55                           (90)


    8/1/19  98.8     76    18      135/90        95  Room Air


     00:07                          (105)


   7/31/19  98.7     78    18      154/80        98


     21:29                          (104)





Physical Exam


Const:   No acute distress


Head:   Atraumatic 


Eyes:    Normal Conjunctiva


ENT:    Normal External Ears, Nose and Mouth.


Neck:                  Full range of motion. No meningismus.


Resp:   Clear to auscultation bilaterally


Cardio:   Regular rate and rhythm, no murmurs


Abd:    Mild general tenderness.  No guarding or rebound.  Nondistended. Normal 


bowel sounds


Skin:   No petechiae or rashes


Back:   No midline or flank tenderness


Ext:    No cyanosis, or edema


Neur:   Awake and alert


Psych:    Normal Mood and Affect


Result Diagram:  


8/1/19 0725 8/1/19 0725





Results 24 hrs





Laboratory Tests


              Test
                                  7/31/19
23:10


              White Blood Count                       6.0 10^3/ul


              Red Blood Count                        3.76 10^6/ul


              Hemoglobin                                11.6 g/dl


              Hematocrit                                   34.7 %


              Mean Corpuscular Volume                     92.3 fl


              Mean Corpuscular Hemoglobin                 30.9 pg


              Mean Corpuscular Hemoglobin
Concent      33.4 g/dl 



              Red Cell Distribution Width                  14.4 %


              Platelet Count                          260 10^3/UL


              Mean Platelet Volume                         9.9 fl


              Immature Granulocytes %                     0.200 %


              Neutrophils %                                79.4 %


              Lymphocytes %                                14.6 %


              Monocytes %                                   5.4 %


              Eosinophils %                                 0.2 %


              Basophils %                                   0.2 %


              Nucleated Red Blood Cells %             0.0 /100WBC


              Immature Granulocytes #               0.010 10^3/ul


              Neutrophils #                           4.7 10^3/ul


              Lymphocytes #                           0.9 10^3/ul


              Monocytes #                             0.3 10^3/ul


              Eosinophils #                           0.0 10^3/ul


              Basophils #                             0.0 10^3/ul


              Nucleated Red Blood Cells #             0.0 10^3/ul


              Urine Color                          YELLOW


              Urine Clarity                        CLEAR


              Urine pH                                        7.0


              Urine Specific Gravity                        1.011


              Urine Ketones                              1+ mg/dL


              Urine Nitrite                        NEGATIVE mg/dL


              Urine Bilirubin                      NEGATIVE mg/dL


              Urine Urobilinogen                   NEGATIVE mg/dL


              Urine Leukocyte Esterase
            NEGATIVE
Davide/ul


              Urine Hemoglobin                     NEGATIVE mg/dL


              Urine Glucose                        NEGATIVE mg/dL


              Urine Total Protein                  NEGATIVE mg/dl


              Sodium Level                             134 mmol/L


              Potassium Level                          4.0 mmol/L


              Chloride Level                            96 mmol/L


              Carbon Dioxide Level                      28 mmol/L


              Anion Gap                                        10


              Blood Urea Nitrogen                        12 mg/dl


              Creatinine                               0.70 mg/dl


              Est Glomerular Filtrat Rate
mL/min    mL/min 



              Glucose Level                             121 mg/dl


              Calcium Level                             9.9 mg/dl


              Total Bilirubin                           1.1 mg/dl


              Direct Bilirubin                         0.00 mg/dl


              Indirect Bilirubin                        1.1 mg/dl


              Aspartate Amino Transf
(AST/SGOT)          28 IU/L 



              Alanine Aminotransferase
(ALT/SGPT)        24 IU/L 



              Alkaline Phosphatase                        66 IU/L


              Total Protein                              8.1 g/dl


              Albumin                                    4.7 g/dl


              Globulin                                  3.40 g/dl


              Albumin/Globulin Ratio                         1.38


              Lipase                                       66 U/L





Current Medications


 Medications
   Dose
          Sig/Stephanie
       Start Time
   Status  Last


 (Trade)       Ordered        Route
 PRN     Stop Time              Admin
Dose


                              Reason                                Admin


 Ondansetron    4 mg           ONCE  STAT
    7/31/19       DC           7/31/19


HCl
  (Zofran                 IV
            22:52
                       23:21



Inj)                                         7/31/19 22:53


 Morphine       4 mg           ONCE  STAT
    7/31/19       DC            8/1/19


Sulfate
                      IV
            23:55
                       00:02



(morphine)                                   7/31/19 23:56








Procedures/MDM


MDM





The patient's presentation warrants further investigation. Previous medical 


records, if available, were reviewed.





LABS





The patient's laboratory testing was obtained and reviewed. No emergent 


treatment was required unless described below.





CBC:   Normocytic anemia, not emergent.  No E/o systemic infection or 


thrombocytopenia


Chemistry:   No E/o severe acidosis or alkalosis or renal failure or liver 


disease or diabetic ketoacidosis


Lipase:   No E/o pancreatitis


Urine:   No E/o acute infection or hematuria





IMAGING





Imaging and Radiology interpretation reviewed.





CXR


FINDINGS: There has been interval placement of a nasogastric tube with the tip 


looped in the gastric fundus region. The cardiomediastinal silhouette is within 


normal limits. The thoracic aortic arch is calcified. There is a shallow 


inspiration. There is minimal left basilar subsegmental atelectasis. There is no


evidence for focal consolidation.  There is no evidence for congestive heart ryan


lure. There is no evidence for pneumothorax. Surgical clips are seen within the 


right upper quadrant of the abdomen. The osseous structures are intact. 


IMPRESSION: 


1.  Nasogastric tube are in place.


2.  Calcified thoracic aortic arch.


3.  Shallow inspiration with minimal left basilar subsegmental atelectasis.


Electronically viewed and signed by .Stephen Levy MD, MD on 08/01/2019 04:14





KUB


FINDINGS:


Medical devices:  None.


There is a mildly dilated loop of small bowel in the left lower quadrant of the 


abdomen that measures 4 cm. There is gas and stool in nondistended colon. 


There are multiple surgical clips over the central abdomen and pelvis as before.


Round calcifications in the pelvis resemble phleboliths.


There is an old fracture deformity of the right obturator ring. No acute bony 


abnormality. 


Additional comment:  None.


IMPRESSION:


1.  Dilated loop of small bowel in the left lower quadrant of the abdomen is 


nonspecific and could be due to partial obstruction or dysmotility.


2.  Evaluation for free intraperitoneal air and air-fluid levels is limited on a


supine film. If there is an acute abdomen and concern for free air, recommend 


further evaluation with a decubitus view or upright chest x-ray. An upright or 


decubitus view could also better evaluate for fluid levels in the setting of 


bowel obstruction.


Electronically viewed and signed by Physician Satish on 08/01/2019 


01:22 





CT abdomen pelvis


FINDINGS: Again noted are multiple ventral wall fat containing hernias without 


herniated bowel or strangulation. Moderate dilated loops of small bowel seen 


within the left abdomen with normal-caliber more proximal small bowel and 


duodenum and distal small bowel consistent with partial moderate closed loop 


small bowel obstruction. Findings appears similar to the previous study. Stomach


unremarkable. Scattered colonic diverticular changes but no CT evidence di


verticulitis. A definite appendix is not visualized however no CT evidence of 


appendicitis. Surgical clips in the right left pelvis. Surgical clips seen along


the right and left psoas muscles. The kidneys are normal in size without calculi


or intra renal masses bilaterally. Bilateral pelvocaliectasis and prominent 


proximal ureters but no obstructing lesions demonstrated unchanged. Urinary 


bladder are unremarkable. Status post hysterectomy. No adnexal masses. No 


evidence of intra-abdominal free air, free fluid, abscesses or lymphadenopathy. 


Liver spleen pancreas adrenal glands and gallbladder are marked. No evidence 


biliary ductal dilation. Bilateral chronic parenchymal lung disease and cylin


drical bronchiectasis. Calcific atherosclerosis of the aorta without aneurysm. 


Chronic L5 compression fracture. Old fracture deformity of the medial right 


superior and inferior pubic rami. Degenerate changes of the lower thoracic and 


lumbar spine. No acute osseous findings are osteoblastic/osteolytic lesions. 


IMPRESSION:


1.  Moderate partial closed loop small bowel obstruction within the left abd


ominal cavity as described above that appears similar to the previous CT abdomen


pelvis 05/30/2019.


2.  Colonic diverticulosis without CT evidence diverticulitis. No evidence of 


appendicitis.


3.  Bilateral small mild pelvocaliectasis and prominent proximal ureters 


slightly more prominent left without obstructing lesions unchanged.


4.  Status post prior hysterectomy.


5.  Multiple midline ventral abdominal wall fat containing hernias without 


herniated bowel or strangulation.


Electronically viewed and signed by YANETH Hahn Physician on 08/01/2019 02:46 


 


TREATMENT/DISPOSITION





The patient's symptoms are consistent with a partial small bowel obstruction 


likely due to adhesions from her past abdominal surgeries.  The patient has 


recurrent small bowel obstructions, and her CT is similar in appearance as 


previous studies.  An NG tube was placed.  General surgery was consulted.





The patient does have general abdominal discomfort no exquisite focal 


tenderness.  I do not see any evidence of viscus perforation.  The patient does 


not have any evidence of peritonitis. The patient does not have clinical 


symptoms concerning for mesenteric ischemia or ischemic colitis.  There is 


evidence of diverticulosis on the CT without diverticulitis.  The patient does 


not have appendicitis.  The patient does have fat-containing hernias in the 


abdominal wall without herniation or strangulation. I have low suspicion for 


gallstones, cholecystitis or biliary colic. I have low suspicion for gastritis, 


PUD or GERD. The patient does not have left upper quadrant tenderness.  I have 


low suspicion for pancreatitis.  Patient's urinalysis is normal.  The patient 


does have chronic suprapubic tenderness, but I have decreased suspicion for 


cystitis.  The patient does not have any flank tenderness.  The patient does not


have gross hematuria.  I have decreased suspicion for nephrolithiasis or renal 


colic.  The patient does not have any palpable pulsatile mass or severe 


abdominal pain radiating to the back. I have low suspicion for aortic aneurysm, 


dissection or rupture.





The patient was treated with morphine and zofran for symptoms control.





ADMISSION





At this time, I feel that the patient requires admission for further evaluation 


and management. The patient will be admitted to Panel in accordance with the 


patient's insurance.  The patient was accepted by Dr. Monae at 0253AM on 8/1/2019


to telemetry.





Dr. Russo was consulted on the case and will assess the patient in the 


hospital.





DISCLAIMER





Inadvertent spelling and grammatical errors are likely due to EHR/dictation 


software use and do not reflect on the overall quality of patient care. Note 


that the electronic time recorded on this note does not necessarily reflect the 


actual time of the patient encounter.





Departure


Diagnosis:  


   Primary Impression:  


   SBO (small bowel obstruction)


   Additional Impressions:  


   Abdominal pain


   Abdominal location:  generalized  Qualified Codes:  R10.84 - Generalized 


   abdominal pain


   Nausea & vomiting


   Vomiting type:  unspecified  Vomiting Intractability:  non-intractable  


   Qualified Codes:  R11.2 - Nausea with vomiting, unspecified


   Normocytic anemia


   H/O of hysterectomy with bilateral oophorectomy


   Diverticulosis


   Abdominal wall hernia


   History of cervical cancer in adulthood


Condition:  Serious











CESAR AJ MD               Aug 1, 2019 05:28

## 2019-08-02 VITALS — RESPIRATION RATE: 18 BRPM | SYSTOLIC BLOOD PRESSURE: 101 MMHG | HEART RATE: 76 BPM | DIASTOLIC BLOOD PRESSURE: 55 MMHG

## 2019-08-02 VITALS — DIASTOLIC BLOOD PRESSURE: 59 MMHG | HEART RATE: 76 BPM | SYSTOLIC BLOOD PRESSURE: 106 MMHG | RESPIRATION RATE: 18 BRPM

## 2019-08-02 VITALS — DIASTOLIC BLOOD PRESSURE: 57 MMHG | RESPIRATION RATE: 16 BRPM | SYSTOLIC BLOOD PRESSURE: 105 MMHG | HEART RATE: 64 BPM

## 2019-08-02 VITALS — HEART RATE: 70 BPM | DIASTOLIC BLOOD PRESSURE: 67 MMHG | RESPIRATION RATE: 18 BRPM | SYSTOLIC BLOOD PRESSURE: 102 MMHG

## 2019-08-02 LAB
ADD MAN DIFF?: NO
ALANINE AMINOTRANSFERASE: 16 IU/L (ref 13–69)
ALBUMIN/GLOBULIN RATIO: 1.26
ALBUMIN: 3.8 G/DL (ref 3.3–4.9)
ALKALINE PHOSPHATASE: 49 IU/L (ref 42–121)
ANION GAP: 6 (ref 5–13)
ASPARTATE AMINO TRANSFERASE: 22 IU/L (ref 15–46)
BASOPHIL #: 0 10^3/UL (ref 0–0.1)
BASOPHILS %: 0.4 % (ref 0–2)
BILIRUBIN,DIRECT: 0 MG/DL (ref 0–0.2)
BILIRUBIN,TOTAL: 1.3 MG/DL (ref 0.2–1.3)
BLOOD UREA NITROGEN: 12 MG/DL (ref 7–20)
CALCIUM: 8.6 MG/DL (ref 8.4–10.2)
CARBON DIOXIDE: 30 MMOL/L (ref 21–31)
CHLORIDE: 102 MMOL/L (ref 97–110)
CREATININE: 0.69 MG/DL (ref 0.44–1)
EOSINOPHILS #: 0 10^3/UL (ref 0–0.5)
EOSINOPHILS %: 1.5 % (ref 0–7)
GLOBULIN: 3 G/DL (ref 1.3–3.2)
GLUCOSE: 120 MG/DL (ref 70–220)
HEMATOCRIT: 33.3 % (ref 37–47)
HEMOGLOBIN: 10.8 G/DL (ref 12–16)
IMMATURE GRANS #M: 0 10^3/UL (ref 0–0.03)
IMMATURE GRANS % (M): 0 % (ref 0–0.43)
LYMPHOCYTES #: 0.7 10^3/UL (ref 0.8–2.9)
LYMPHOCYTES %: 27.7 % (ref 15–51)
MAGNESIUM: 1.9 MG/DL (ref 1.7–2.5)
MEAN CORPUSCULAR HEMOGLOBIN: 30.8 PG (ref 29–33)
MEAN CORPUSCULAR HGB CONC: 32.4 G/DL (ref 32–37)
MEAN CORPUSCULAR VOLUME: 94.9 FL (ref 82–101)
MEAN PLATELET VOLUME: 9.7 FL (ref 7.4–10.4)
MONOCYTE #: 0.2 10^3/UL (ref 0.3–0.9)
MONOCYTES %: 6.4 % (ref 0–11)
NEUTROPHIL #: 1.7 10^3/UL (ref 1.6–7.5)
NEUTROPHILS %: 64 % (ref 39–77)
NUCLEATED RED BLOOD CELLS #: 0 10^3/UL (ref 0–0)
NUCLEATED RED BLOOD CELLS%: 0 /100WBC (ref 0–0)
PHOSPHORUS: 3.2 MG/DL (ref 2.5–4.9)
PLATELET COUNT: 251 10^3/UL (ref 140–415)
POTASSIUM: 4 MMOL/L (ref 3.5–5.1)
RED BLOOD COUNT: 3.51 10^6/UL (ref 4.2–5.4)
RED CELL DISTRIBUTION WIDTH: 14.5 % (ref 11.5–14.5)
SODIUM: 138 MMOL/L (ref 135–144)
TOTAL PROTEIN: 6.8 G/DL (ref 6.1–8.1)
WHITE BLOOD COUNT: 2.6 10^3/UL (ref 4.8–10.8)

## 2019-08-02 RX ADMIN — ENOXAPARIN SODIUM 1 MG: 100 INJECTION SUBCUTANEOUS at 08:53

## 2019-08-02 RX ADMIN — FOLIC ACID SCH MLS/HR: 5 INJECTION, SOLUTION INTRAMUSCULAR; INTRAVENOUS; SUBCUTANEOUS at 02:38

## 2019-08-02 RX ADMIN — ASCORBIC ACID, VITAMIN A PALMITATE, CHOLECALCIFEROL, THIAMINE HYDROCHLORIDE, RIBOFLAVIN-5 PHOSPHATE SODIUM, PYRIDOXINE HYDROCHLORIDE, NIACINAMIDE, DEXPANTHENOL, ALPHA-TOCOPHEROL ACETATE, VITAMIN K1, FOLIC ACID, BIOTIN, CYANOCOBALAMIN 1 MLS/HR: 200; 3300; 200; 6; 3.6; 6; 40; 15; 10; 150; 600; 60; 5 INJECTION, SOLUTION INTRAVENOUS at 22:53

## 2019-08-02 RX ADMIN — ENOXAPARIN SODIUM SCH MG: 100 INJECTION SUBCUTANEOUS at 08:53

## 2019-08-02 RX ADMIN — ASCORBIC ACID, VITAMIN A PALMITATE, CHOLECALCIFEROL, THIAMINE HYDROCHLORIDE, RIBOFLAVIN-5 PHOSPHATE SODIUM, PYRIDOXINE HYDROCHLORIDE, NIACINAMIDE, DEXPANTHENOL, ALPHA-TOCOPHEROL ACETATE, VITAMIN K1, FOLIC ACID, BIOTIN, CYANOCOBALAMIN 1 MLS/HR: 200; 3300; 200; 6; 3.6; 6; 40; 15; 10; 150; 600; 60; 5 INJECTION, SOLUTION INTRAVENOUS at 12:33

## 2019-08-02 RX ADMIN — FOLIC ACID SCH MLS/HR: 5 INJECTION, SOLUTION INTRAMUSCULAR; INTRAVENOUS; SUBCUTANEOUS at 22:53

## 2019-08-02 RX ADMIN — FOLIC ACID SCH MLS/HR: 5 INJECTION, SOLUTION INTRAMUSCULAR; INTRAVENOUS; SUBCUTANEOUS at 12:33

## 2019-08-02 RX ADMIN — ASCORBIC ACID, VITAMIN A PALMITATE, CHOLECALCIFEROL, THIAMINE HYDROCHLORIDE, RIBOFLAVIN-5 PHOSPHATE SODIUM, PYRIDOXINE HYDROCHLORIDE, NIACINAMIDE, DEXPANTHENOL, ALPHA-TOCOPHEROL ACETATE, VITAMIN K1, FOLIC ACID, BIOTIN, CYANOCOBALAMIN 1 MLS/HR: 200; 3300; 200; 6; 3.6; 6; 40; 15; 10; 150; 600; 60; 5 INJECTION, SOLUTION INTRAVENOUS at 02:38

## 2019-08-02 NOTE — PN
Date/Time of Note


Date/Time of Note


DATE: 8/2/19 


TIME: 13:13





Assessment/Plan


Lines/Catheters


IV Catheter Type (from [facility]):  Peripheral IV


Polanco in Place (from [facility]):  No





Assessment/Plan


Chief Complaint/Hosp Course


1.  Moderate partial closed-loop small bowel obstruction: Recurrent SBO; SBFT 


noted without obstruction


-Clear liquids and advance diet as tolerated


-If tolerating diet, may be discharged per medical team with follow-up with 


primary surgeon


2.  Abdominal pain: Much improved


-Pain management


-As above


3.Hyponatremia: Resolved


4. Normocytic normochromic anemia:


-Monitor and transfuse as needed


5. Leukopenia:


-Monitor


6.  Thrombocytopenia:


-Bleeding precautions


7.  Ventral hernia: Asymptomatic


-Monitor


8.  History of cervical cancer status post chemotherapy and radiation:


-Oncology follow-up


9.  Colonic diverticulosis:


-Lifestyle optimization





Thank you. Patient seen and examined in collaboration with Dr. Avel Russo.





Subjective


24 Hr Interval Summary


SBFT noted.  + Bowel function.  Patient feels much better.  No fevers, chills, 


sob, congested cough, cp, palpitations, ha, dizziness, nausea, vomiting, macario


rrhea, dysuria.





Exam/Review of Systems


Vital Signs


Vitals





Vital Signs


  Date      Temp  Pulse  Resp  B/P (MAP)   Pulse Ox  O2          O2 Flow    FiO2


Time                                                 Delivery    Rate


    8/2/19  98.6     76    18      101/55        96


     08:00                           (70)


    8/2/19                                           Room Air


     02:00








Intake and Output





8/1/19 8/1/19 8/2/19





1515:00


23:00


07:00





IntakeIntake Total


1000 ml


1400 ml





OutputOutput Total


3 ml





BalanceBalance


997 ml


1400 ml














Exam


Free Text/Dictation


Constitutional:  alert, oriented, well developed


Psych:  nl mood/affect; 


   No anxiety


Head:  normocephalic, atraumatic


Eyes:  nl conjunctiva, EOMI, nl lids, nl sclera


ENMT:  nl external ears & nose, nl lips & teeth, mucosa pink and moist, other 


(NGT)


Neck:  supple, non-tender; 


   No jvd


Respiratory:  normal air movement; 


   No congested cough


Cardiovascular:  regular rate and rhythm, nl pulses; 


   No edema


Gastrointestinal:  soft, distended, tender (Bilateral lower quadrants), other 


(Ventral hernia; no abdominal skin changes); 


   No firm, No rebound or guarding


Genitourinary - Female:  nl external genitalia


Musculoskeletal:  nl extremities to inspection; 


   No nl gait and stance


Extremities:  normal pulses


Neurological:  nl mental status, nl speech, nl strength


Skin:  nl turgor; 


   No rash or lesions


Lymph:  nl lymph nodes





Results


Result Diagram:  


8/2/19 0544                                                                     


          8/2/19 0500














TERESE HEIN NP        Aug 2, 2019 13:14

## 2019-08-02 NOTE — PN
Date/Time of Note


Date/Time of Note


DATE: 8/2/19 


TIME: 12:35





Assessment/Plan


VTE Prophylaxis


Risk score (from Ns)>0 risk:  2


SCD applied (from Ns):  Yes


Pharmacological prophylaxis:  LMWH





Lines/Catheters


IV Catheter Type (from [facility]rs):  Peripheral IV


Urinary Cath still in place:  No





Assessment/Plan


Hospital Course


Assessment and plan





#Recurrent SBO


Patient has been have NG tube in place


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


Surgeon following.


Continue n.p.o. for now.


Continue IV fluids


Reports feeling better today - f/u surgeon for d/c NG tube and starting diet 





#History of cervical cancer status post hysterectomy and chemo/radiation in 2010


Patient for outpatient follow-up





#Mild hyponatremia


Continue IV fluids





#Normocytic anemia.


Monitor H&H.


Stable at present





#Leukopenia


Etiology unknown


Monitor trend.


Monitor for fevers





Disposition and plan.  Appears to be improving.  DC NG tube per surgeon, then  


Advance diet as tolerated.  DC planning





Discussed POC with Dr. Wheeler 


.


Result Diagram:  


8/2/19 0544                                                                     


          8/2/19 0500





Results 24hrs





Laboratory Tests


        Test
                                8/2/19
05:00  8/2/19
05:44


        Sodium Level                                138


        Potassium Level                             4.0


        Chloride Level                              102


        Carbon Dioxide Level                         30


        Anion Gap                                     6


        Blood Urea Nitrogen                          12


        Creatinine                                 0.69


        Est Glomerular Filtrat Rate
mL/min     
           



        Glucose Level                               120


        Calcium Level                               8.6


        Phosphorus Level                            3.2


        Magnesium Level                             1.9


        Total Bilirubin                             1.3


        Direct Bilirubin                           0.00


        Indirect Bilirubin                         1.3  H


        Aspartate Amino Transf
(AST/SGOT)           22  
  



        Alanine Aminotransferase
(ALT/SGPT)         16  
  



        Alkaline Phosphatase                         49


        Total Protein                              6.8  #


        Albumin                                     3.8


        Globulin                                   3.00


        Albumin/Globulin Ratio                     1.26


        White Blood Count                                        2.6  L


        Red Blood Count                                         3.51  L


        Hemoglobin                                              10.8  L


        Hematocrit                                              33.3  L


        Mean Corpuscular Volume                                  94.9


        Mean Corpuscular Hemoglobin                              30.8


        Mean Corpuscular Hemoglobin
Concent  
                  32.4  



        Red Cell Distribution Width                              14.5


        Platelet Count                                           251  #


        Mean Platelet Volume                                     9.7  #


        Immature Granulocytes %                                0.000  L


        Neutrophils %                                            64.0


        Lymphocytes %                                            27.7


        Monocytes %                                               6.4


        Eosinophils %                                             1.5


        Basophils %                                               0.4


        Nucleated Red Blood Cells %                               0.0


        Immature Granulocytes #                                 0.000


        Neutrophils #                                             1.7


        Lymphocytes #                                            0.7  L


        Monocytes #                                              0.2  L


        Eosinophils #                                             0.0


        Basophils #                                               0.0


        Nucleated Red Blood Cells #                               0.0








Subjective


24 Hr Interval Summary


Free Text/Dictation


no s/s of distress. reports having bowel movement. reports less abd pain





Exam/Review of Systems


Exam


Vitals





Vital Signs


  Date      Temp  Pulse  Resp  B/P (MAP)   Pulse Ox  O2          O2 Flow    FiO2


Time                                                 Delivery    Rate


    8/2/19  98.6     76    18      101/55        96


     08:00                           (70)


    8/2/19                                           Room Air


     02:00








Intake and Output





8/1/19 8/1/19 8/2/19





1515:00


23:00


07:00





IntakeIntake Total


1000 ml


1400 ml





OutputOutput Total


3 ml





BalanceBalance


997 ml


1400 ml











Exam


Constitutional:  alert, oriented


Psych:  nl mood/affect


Head:  normocephalic


Eyes:  nl conjunctiva


Respiratory:  clear to auscultation, normal air movement


Cardiovascular:  regular rate and rhythm


Gastrointestinal:  soft, other (minimally tender, hypoactive bowel sounds )


Musculoskeletal:  nl extremities to inspection


Neurological:  CNS II-XII intact, nl mental status, nl speech


Skin:  nl turgor





Results


Results 24hrs





Laboratory Tests


        Test
                                8/2/19
05:00  8/2/19
05:44


        Sodium Level                                138


        Potassium Level                             4.0


        Chloride Level                              102


        Carbon Dioxide Level                         30


        Anion Gap                                     6


        Blood Urea Nitrogen                          12


        Creatinine                                 0.69


        Est Glomerular Filtrat Rate
mL/min     
           



        Glucose Level                               120


        Calcium Level                               8.6


        Phosphorus Level                            3.2


        Magnesium Level                             1.9


        Total Bilirubin                             1.3


        Direct Bilirubin                           0.00


        Indirect Bilirubin                         1.3  H


        Aspartate Amino Transf
(AST/SGOT)           22  
  



        Alanine Aminotransferase
(ALT/SGPT)         16  
  



        Alkaline Phosphatase                         49


        Total Protein                              6.8  #


        Albumin                                     3.8


        Globulin                                   3.00


        Albumin/Globulin Ratio                     1.26


        White Blood Count                                        2.6  L


        Red Blood Count                                         3.51  L


        Hemoglobin                                              10.8  L


        Hematocrit                                              33.3  L


        Mean Corpuscular Volume                                  94.9


        Mean Corpuscular Hemoglobin                              30.8


        Mean Corpuscular Hemoglobin
Concent  
                  32.4  



        Red Cell Distribution Width                              14.5


        Platelet Count                                           251  #


        Mean Platelet Volume                                     9.7  #


        Immature Granulocytes %                                0.000  L


        Neutrophils %                                            64.0


        Lymphocytes %                                            27.7


        Monocytes %                                               6.4


        Eosinophils %                                             1.5


        Basophils %                                               0.4


        Nucleated Red Blood Cells %                               0.0


        Immature Granulocytes #                                 0.000


        Neutrophils #                                             1.7


        Lymphocytes #                                            0.7  L


        Monocytes #                                              0.2  L


        Eosinophils #                                             0.0


        Basophils #                                               0.0


        Nucleated Red Blood Cells #                               0.0








Medications


Medication





Current Medications


Dextrose/Sodium Chloride 1,000 ml @  100 mls/hr Q10H IV  Last administered on 


8/2/19at 12:33; Admin Dose 100 MLS/HR;  Start 8/1/19 at 05:30


Morphine Sulfate (morphine) 3 mg Q3H  PRN IV SEVERE PAIN LEVEL 7-10 Last 


administered on 8/1/19at 13:56; Admin Dose 3 MG;  Start 8/1/19 at 05:30


IV Flush (NS 3 ml) 3 ml PER PROTOCOL IV ;  Start 8/1/19 at 07:00


Ondansetron HCl (Zofran Inj) 4 mg Q6H  PRN IV NAUSEA/VOMITING;  Start 8/1/19 at 


07:00


Albuterol/ Ipratropium (Duoneb) 3 ml Q2H RESP THERAPY  PRN HHN SHORTNESS OF 


BREATH;  Start 8/1/19 at 07:00


Enoxaparin Sodium (Lovenox) 40 mg DAILY SC  Last administered on 8/2/19at 08:53;


Admin Dose 40 MG;  Start 8/2/19 at 09:00











NADIRA LUCERO NP              Aug 2, 2019 12:37

## 2019-08-03 VITALS — RESPIRATION RATE: 18 BRPM | HEART RATE: 72 BPM | SYSTOLIC BLOOD PRESSURE: 90 MMHG | DIASTOLIC BLOOD PRESSURE: 52 MMHG

## 2019-08-03 VITALS — HEART RATE: 68 BPM | SYSTOLIC BLOOD PRESSURE: 90 MMHG | DIASTOLIC BLOOD PRESSURE: 56 MMHG | RESPIRATION RATE: 18 BRPM

## 2019-08-03 VITALS — SYSTOLIC BLOOD PRESSURE: 103 MMHG | DIASTOLIC BLOOD PRESSURE: 52 MMHG | HEART RATE: 65 BPM | RESPIRATION RATE: 16 BRPM

## 2019-08-03 RX ADMIN — ENOXAPARIN SODIUM SCH MG: 100 INJECTION SUBCUTANEOUS at 08:42

## 2019-08-03 RX ADMIN — ENOXAPARIN SODIUM 1 MG: 100 INJECTION SUBCUTANEOUS at 08:42

## 2019-08-03 NOTE — PDOCDIS
Discharge Instructions


DIAGNOSIS


Discharge Diagnosis


#Recurrent Small Bowel Obstruction 


#History of cervical cancer status post hysterectomy and chemo/radiation in 2010


#Mild hyponatremia


#Normocytic anemia.


#Leukopenia





CONDITION


                [id number]
Patient Condition:  [id number]
Stable








HOME CARE INSTRUCTIONS:


         [id number]
Diet Instructions:  [id number]
Low Fat /Cholesterol








FOLLOW UP/APPOINTMENTS


Follow-up Plan





1. Follow up with your primary doctor in one week for full check up and follow 


up on your blood work





2. Follow up with Dr. Avel Russo in one week


Office Address 


[address] [place] [id number] 


Office Telephone 


[phone]











NADIRA LUCERO NP              Aug 3, 2019 13:38

## 2019-08-03 NOTE — DS
Date/Time of Note


Date/Time of Note


DATE: 8/3/19 


TIME: 13:40





Discharge Summary


Admission/Discharge Info


Admit Date/Time


Aug 1, 2019 at 02:55


Discharge Date/Time





Discharge Diagnosis


#Recurrent Small Bowel Obstruction 


#History of cervical cancer status post hysterectomy and chemo/radiation in 2010


#Mild hyponatremia


#Normocytic anemia.


#Leukopenia


Patient Condition:  Stable


Hospital Course


This is a 69-year-old female history of cervical cancer status post 


hysterectomy, chemo/radiation in 2010, history of multiple recurrent SBO, came 


to the hospital due to reports of abdominal pain and distention for 1 day 


duration.  She reports that she feels that her symptoms are similar to her 


previous SBO which was in May of this year.  As such she went to the hospital.  


She did have abdominal imaging again that did show her to have moderate partial 


closed loop small bowel obstruction within the left abdominal cavity.  She was 


seen by general surgeon.  She was placed on IV fluids.  She also had NG tube 


initially placed.  During her course of stay she did improve.  We did eventually


discontinue her NG tube and she was advanced in diet.  Follow-up small bowel 


series showed no evidence of bowel obstruction.  We did speak with  


who did assist with getting patient outpatient follow-up with GI.  The plan of 


care was discussed with the patient and patient verbalized understanding.  On 


the day of discharge patient was in stable condition





Discussed POC with Dr. Wheeler


Home Meds


Reported Medications


Vit C/E/Zn/Coppr/Lutein/Zeaxan (Preservision Areds 2 Softgel) 1 Each Capsule, 1 


EACH PO PC BREAKFAST, CAP


   8/1/19


Polyethylene Glycol* (Polyethylene Glycol*) 17 Gm Powd.pack, 17 GM PO DAILY for 


30 Days, #30


   5/30/19


Alendronate Sodium* (Fosamax*) 70 Mg Tablet, 70 MG PO Q7D for QTHURSDAY for 60 


Days


   5/30/19


Omega-3 Fatty Acids/Fish Oil (Fish Oil 1,000 mg Capsule) 1 Each Capsule, 1 EACH 


PO DAILY, CAP


   2/21/19


Multivitamins* (Theragran*) 1 Tab Tab, 1 TAB PO DAILY, TAB


   7/13/18


Follow-up Plan





1. Follow up with your primary doctor in one week for full check up and follow 


up on your blood work





2. Follow up with Dr. Avel Russo in one week


Office Address 


[address] [place] [address] 


Office Telephone 


[phone]


Primary Care Provider


Jorge Parrish


Time spent on discharge:   > 30 minutes











NADIRA LUCERO NP              Aug 3, 2019 13:40

## 2019-08-04 NOTE — PN
Date/Time of Note


Date/Time of Note


DATE: 8/3/19 


TIME: 11:40





Assessment/Plan


Lines/Catheters


IV Catheter Type (from [facility]):  Peripheral IV


Polanco in Place (from [facility]):  No





Assessment/Plan


Chief Complaint/Hosp Course


1.  Moderate partial closed-loop small bowel obstruction: Recurrent SBO; SBFT 


noted without obstruction.  Tolerated diet.


-Diet


-D/C planning


2.  Abdominal pain: Much improved


-Pain management


-As above


3.Hyponatremia: Resolved


4. Normocytic normochromic anemia:


-Monitor and transfuse as needed


5. Leukopenia:


-Monitor


6.  Thrombocytopenia:


-Bleeding precautions


7.  Ventral hernia: Asymptomatic


-Monitor


8.  History of cervical cancer status post chemotherapy and radiation:


-Oncology follow-up


9.  Colonic diverticulosis:


-Lifestyle optimization





Thank you





Late entry 8/3





Subjective


24 Hr Interval Summary


+ Bowel function.  Feels much better.  No fevers, chills, sob, congested cough, 


cp, palpitations, ha, dizziness, nausea, vomiting, diarrhea, dysuria.





Exam/Review of Systems


Vital Signs


Vitals





Vital Signs


  Date      Temp  Pulse  Resp  B/P (MAP)   Pulse Ox  O2          O2 Flow    FiO2


Time                                                 Delivery    Rate


    8/3/19  98.7     72    18  90/52 (65)        98


     14:00


    8/2/19                                           Room Air


     02:00








Intake and Output





8/3/19


8/3/19


8/4/19





1515:00


23:00


07:00





IntakeIntake Total


540 ml


480 ml





OutputOutput Total


3 ml





BalanceBalance


540 ml


477 ml














Exam


Free Text/Dictation


Constitutional:  alert, oriented, well developed


Psych:  nl mood/affect; 


   No anxiety


Head:  normocephalic, atraumatic


Eyes:  nl conjunctiva, EOMI, nl lids, nl sclera


ENMT:  nl external ears & nose, nl lips & teeth, mucosa pink and moist, other 


(NGT)


Neck:  supple, non-tender; 


   No jvd


Respiratory:  normal air movement; 


   No congested cough


Cardiovascular:  regular rate and rhythm, nl pulses; 


   No edema


Gastrointestinal:  soft, distended, tender (Bilateral lower quadrants), other 


(Ventral hernia; no abdominal skin changes); 


   No firm, No rebound or guarding


Genitourinary - Female:  nl external genitalia


Musculoskeletal:  nl extremities to inspection; 


   No nl gait and stance


Extremities:  normal pulses


Neurological:  nl mental status, nl speech, nl strength


Skin:  nl turgor; 


   No rash or lesions


Lymph:  nl lymph nodes





Results


Result Diagram:  


8/2/19 0544                                                                     


          8/2/19 0500














EMMA HARRIS MD               Aug 4, 2019 01:42